# Patient Record
Sex: MALE | Race: WHITE | NOT HISPANIC OR LATINO | Employment: OTHER | URBAN - METROPOLITAN AREA
[De-identification: names, ages, dates, MRNs, and addresses within clinical notes are randomized per-mention and may not be internally consistent; named-entity substitution may affect disease eponyms.]

---

## 2017-01-17 ENCOUNTER — HOSPITAL ENCOUNTER (OUTPATIENT)
Dept: RADIOLOGY | Facility: HOSPITAL | Age: 77
Discharge: HOME | End: 2017-01-17
Attending: NEUROLOGICAL SURGERY
Payer: MEDICARE

## 2017-01-17 ENCOUNTER — OFFICE VISIT (OUTPATIENT)
Dept: NEUROSURGERY | Facility: CLINIC | Age: 77
End: 2017-01-17
Payer: MEDICARE

## 2017-01-17 VITALS
WEIGHT: 175 LBS | HEART RATE: 71 BPM | BODY MASS INDEX: 26.52 KG/M2 | HEIGHT: 68 IN | SYSTOLIC BLOOD PRESSURE: 140 MMHG | DIASTOLIC BLOOD PRESSURE: 68 MMHG | TEMPERATURE: 99 F

## 2017-01-17 DIAGNOSIS — C41.2 CHORDOMA: ICD-10-CM

## 2017-01-17 DIAGNOSIS — C41.2 CHORDOMA: Primary | ICD-10-CM

## 2017-01-17 LAB
CREAT SERPL-MCNC: 1.1 MG/DL (ref 0.5–1.4)
SAMPLE: NORMAL

## 2017-01-17 PROCEDURE — 1159F MED LIST DOCD IN RCRD: CPT | Mod: S$GLB,,, | Performed by: NEUROLOGICAL SURGERY

## 2017-01-17 PROCEDURE — 1157F ADVNC CARE PLAN IN RCRD: CPT | Mod: S$GLB,,, | Performed by: NEUROLOGICAL SURGERY

## 2017-01-17 PROCEDURE — 72197 MRI PELVIS W/O & W/DYE: CPT | Mod: 26,,, | Performed by: RADIOLOGY

## 2017-01-17 PROCEDURE — 1126F AMNT PAIN NOTED NONE PRSNT: CPT | Mod: S$GLB,,, | Performed by: NEUROLOGICAL SURGERY

## 2017-01-17 PROCEDURE — 99214 OFFICE O/P EST MOD 30 MIN: CPT | Mod: S$GLB,,, | Performed by: NEUROLOGICAL SURGERY

## 2017-01-17 PROCEDURE — 1160F RVW MEDS BY RX/DR IN RCRD: CPT | Mod: S$GLB,,, | Performed by: NEUROLOGICAL SURGERY

## 2017-01-17 PROCEDURE — 99999 PR PBB SHADOW E&M-EST. PATIENT-LVL III: CPT | Mod: PBBFAC,,, | Performed by: NEUROLOGICAL SURGERY

## 2017-01-17 RX ORDER — ASPIRIN 81 MG/1
162 TABLET ORAL DAILY
Status: ON HOLD | COMMUNITY
End: 2023-05-03 | Stop reason: SDUPTHER

## 2017-01-17 RX ORDER — GADOBUTROL 604.72 MG/ML
9 INJECTION INTRAVENOUS
Status: COMPLETED | OUTPATIENT
Start: 2017-01-17 | End: 2017-01-17

## 2017-01-17 RX ADMIN — GADOBUTROL 9 ML: 604.72 INJECTION INTRAVENOUS at 02:01

## 2017-01-17 NOTE — LETTER
January 19, 2017      Zackery Haddad MD  [address]  [address]  [facility] [place] [address]           [facility] - Neurosurgery 7th Fl  [address]  [facility] [address]  Phone: [phone]          Patient: Nimisha Longoria   MR Number: [id number]   YOB: 1940   Date of Visit: 1/17/2017       Dear Dr. Zackery Haddad:    Thank you for referring Nimisha Longoria to me for evaluation. Attached you will find relevant portions of my assessment and plan of care.    If you have questions, please do not hesitate to call me. I look forward to following Nimisha Longoria along with you.    Sincerely,    David Gonsales MD    Enclosure  CC:  No Recipients    If you would like to receive this communication electronically, please contact [email or web address] or [phone] to request more information on [facility] access.    For providers and/or their staff who would like to refer a patient to [facility], please contact us through our one-stop-shop provider referral line, [facility], at [phone].    If you feel you have received this communication in error or would no longer like to receive these types of communications, please e-mail [email or web address]

## 2017-01-17 NOTE — PROGRESS NOTES
Subjective:    I, Pool Linton, am scribing for, and in the presence of, Dr. Gonsales.     Patient ID: Nimsiha Longoria is a 76 y.o. male.    Chief Complaint: No chief complaint on file.    HPI   Pt is a 76 y.o. male who presents for follow up after last evaluation on 7/19/2016. 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 Scan at that point showed a regression of the lesion. He is here now with follow up scan. Pt states that he has been doing well, without any pain, without any bowel/bladder complaints, and without any new complaints.     Review of Systems   Constitutional: Negative for chills, diaphoresis, fatigue and fever.   HENT: Negative for congestion, rhinorrhea, sinus pressure, sneezing, sore throat and trouble swallowing.    Eyes: Negative.  Negative for visual disturbance.   Respiratory: Negative for cough, choking, chest tightness and shortness of breath.    Cardiovascular: Negative for chest pain.   Gastrointestinal: Negative for abdominal pain, diarrhea, nausea and vomiting.   Endocrine: Negative.    Genitourinary: Negative for dysuria and enuresis.   Skin: Negative for color change, pallor, rash and wound.   Neurological: Negative for syncope.   Hematological: Does not bruise/bleed easily.   Psychiatric/Behavioral: Negative for confusion.       Objective:      Physical Exam:    Constitutional: He appears well-developed and well-nourished. He is not diaphoretic. No distress.     Eyes: Pupils are equal, round, and reactive to light. Conjunctivae and EOM are normal. Right eye exhibits no discharge. Left eye exhibits no discharge.     Cardiovascular: Normal rate, regular rhythm, normal pulses, intact distal pulses, normal distal pulses, normal carotid pulses and no edema.     Abdominal: Soft.     Skin: Skin displays no rash on trunk and no rash on extremities. Skin displays no lesions on trunk and no lesions on extremities.     Psych/Behavior: He is alert. He is oriented to person, place, and time.  He has a normal mood and affect.     Musculoskeletal: Gait is normal.        Neck: Range of motion is full. There is no tenderness. Muscle strength is 5/5. Tone is normal.        Back: Range of motion is full. There is no tenderness. Muscle strength is 5/5. Tone is normal.        Right Upper Extremities: Range of motion is full. There is no tenderness. Muscle strength is 5/5. Tone is normal.        Left Upper Extremities: Range of motion is full. There is no tenderness. Muscle strength is 5/5. Tone is normal.       Right Lower Extremities: Range of motion is full. There is no tenderness. Muscle strength is 5/5. Tone is normal.        Left Lower Extremities: Range of motion is full. There is no tenderness. Muscle strength is 5/5. Tone is normal.     Neurological:        Coordination: He has a normal Romberg Test, normal finger to nose coordination, normal heel to shin coordination and normal tandem walking coordination.        Sensory: There is no sensory deficit in the trunk. There is no sensory deficit in the extremities.        DTRs: DTRs are DTRS NORMAL AND SYMMETRICnormal and symmetric. DTRs are normal. Tricep reflexes are 2+ on the right side and 2+ on the left side. Bicep reflexes are 2+ on the right side and 2+ on the left side. Brachioradialis reflexes are 2+ on the right side and 2+ on the left side. Patellar reflexes are 2+ on the right side and 2+ on the left side. Achilles reflexes are 2+ on the right side and 2+ on the left side. He displays no Babinski's sign on the right side. He displays no Babinski's sign on the left side.        Cranial nerves: Cranial nerve(s) II, III, IV, V, VI, VII, VIII, IX, X, XI and XII are intact.       Pt has no new complaints.   No new deficits.   He has full strength.   No bowel/bladder issues.     Imaging:   MRI Pelvis, dated 1/17/2017, continued to show large lobulated mass in the sacrum, consistent with a chordoma. Overall, it looks slightly smaller than the scan from  July 2016 and definitely smaller than in October 2015.     Assessment/Plan:   Pt with sacral chordoma s/p radiation treatment. Latest scan continued to show that the mass is a little smaller. The change is not as drastic as last scan. The pt is asymptomatic. He is 76 y.o.. At this point I don't think we need to do anything aggressive. He would like for us to widen the window of observation to once per year. I think that is reasonable. We will plan to see him back in 1 year, unless he has new symptoms.     I, Dr. Gonsales, personally performed the services described in this documentation as scribed by Pool Linton in my presence, and it is both accurate and complete.

## 2018-01-15 ENCOUNTER — TELEPHONE (OUTPATIENT)
Dept: NEUROSURGERY | Facility: CLINIC | Age: 78
End: 2018-01-15

## 2018-01-15 NOTE — TELEPHONE ENCOUNTER
----- Message from Krystian Bro sent at 1/15/2018  1:34 PM CST -----  Contact: Karie (wife) @ [phone]  Karie is calling to schedule appt from [facility]. Pls call.

## 2018-04-03 ENCOUNTER — LAB VISIT (OUTPATIENT)
Dept: LAB | Facility: HOSPITAL | Age: 78
End: 2018-04-03
Attending: NEUROLOGICAL SURGERY
Payer: MEDICARE

## 2018-04-03 ENCOUNTER — OFFICE VISIT (OUTPATIENT)
Dept: NEUROSURGERY | Facility: CLINIC | Age: 78
End: 2018-04-03
Payer: MEDICARE

## 2018-04-03 ENCOUNTER — HOSPITAL ENCOUNTER (OUTPATIENT)
Dept: RADIOLOGY | Facility: HOSPITAL | Age: 78
Discharge: HOME | End: 2018-04-03
Attending: NEUROLOGICAL SURGERY
Payer: MEDICARE

## 2018-04-03 VITALS
DIASTOLIC BLOOD PRESSURE: 63 MMHG | HEIGHT: 68 IN | HEART RATE: 65 BPM | SYSTOLIC BLOOD PRESSURE: 111 MMHG | WEIGHT: 187.13 LBS | BODY MASS INDEX: 28.36 KG/M2

## 2018-04-03 DIAGNOSIS — C41.2 CHORDOMA: ICD-10-CM

## 2018-04-03 DIAGNOSIS — C41.2 CHORDOMA: Primary | ICD-10-CM

## 2018-04-03 LAB
CREAT SERPL-MCNC: 1 MG/DL
CREAT SERPL-MCNC: 1.1 MG/DL (ref 0.5–1.4)
EST. GFR  (AFRICAN AMERICAN): >60 ML/MIN/1.73 M^2
EST. GFR  (NON AFRICAN AMERICAN): >60 ML/MIN/1.73 M^2
SAMPLE: NORMAL

## 2018-04-03 PROCEDURE — 25500020 PHARM REV CODE 255: Performed by: NEUROLOGICAL SURGERY

## 2018-04-03 PROCEDURE — 99214 OFFICE O/P EST MOD 30 MIN: CPT | Mod: S$GLB,,, | Performed by: NEUROLOGICAL SURGERY

## 2018-04-03 PROCEDURE — 99999 PR PBB SHADOW E&M-EST. PATIENT-LVL III: CPT | Mod: PBBFAC,,, | Performed by: NEUROLOGICAL SURGERY

## 2018-04-03 PROCEDURE — 72197 MRI PELVIS W/O & W/DYE: CPT | Mod: 26,,, | Performed by: RADIOLOGY

## 2018-04-03 PROCEDURE — 72197 MRI PELVIS W/O & W/DYE: CPT | Mod: TC

## 2018-04-03 PROCEDURE — 82565 ASSAY OF CREATININE: CPT

## 2018-04-03 PROCEDURE — A9585 GADOBUTROL INJECTION: HCPCS | Performed by: NEUROLOGICAL SURGERY

## 2018-04-03 PROCEDURE — 36415 COLL VENOUS BLD VENIPUNCTURE: CPT

## 2018-04-03 RX ORDER — GADOBUTROL 604.72 MG/ML
9 INJECTION INTRAVENOUS
Status: COMPLETED | OUTPATIENT
Start: 2018-04-03 | End: 2018-04-03

## 2018-04-03 RX ADMIN — GADOBUTROL 9 ML: 604.72 INJECTION INTRAVENOUS at 12:04

## 2018-04-03 NOTE — PROGRESS NOTES
Subjective:    I, Hilary Boyce, attest that this documentation has been prepared under the direction and in the presence of BRIGID Gonsales MD.     Patient ID: Nimisha Longoria is a 78 y.o. male.    Chief Complaint: No chief complaint on file.    HPI   Pt is a 78 y.o. male who presents for f/u with history of sacral chordoma with radiation treatment. Pt states that he is doing well. No new complaints or symptoms.     Review of Systems   Constitutional: Negative for chills, diaphoresis, fatigue and fever.   HENT: Negative for congestion, rhinorrhea, sinus pressure, sneezing, sore throat and trouble swallowing.    Eyes: Negative.  Negative for visual disturbance.   Respiratory: Negative for cough, choking, chest tightness and shortness of breath.    Cardiovascular: Negative for chest pain.   Gastrointestinal: Negative for abdominal pain, diarrhea, nausea and vomiting.   Endocrine: Negative.    Genitourinary: Negative for dysuria.   Skin: Negative for color change, pallor, rash and wound.   Neurological: Negative for syncope.   Hematological: Does not bruise/bleed easily.   Psychiatric/Behavioral: Negative for confusion.       Objective:      Physical Exam:  Nursing note and vitals reviewed.    Constitutional: He appears well-developed.     Eyes: Pupils are equal, round, and reactive to light. Conjunctivae and EOM are normal.     Cardiovascular: Normal rate, regular rhythm, normal pulses and intact distal pulses.     Abdominal: Soft.     Psych/Behavior: He is alert. He is oriented to person, place, and time. He has a normal mood and affect.     Musculoskeletal: Gait is normal.        Neck: Range of motion is full. There is no tenderness. Muscle strength is 5/5. Tone is normal.        Back: Range of motion is full. There is no tenderness. Muscle strength is 5/5. Tone is normal.        Right Upper Extremities: Range of motion is full. There is no tenderness. Muscle strength is 5/5. Tone is normal.        Left Upper Extremities:  Range of motion is full. There is no tenderness. Muscle strength is 5/5. Tone is normal.       Right Lower Extremities: Range of motion is full. There is no tenderness. Muscle strength is 5/5. Tone is normal.        Left Lower Extremities: Range of motion is full. There is no tenderness. Muscle strength is 5/5. Tone is normal.     Neurological:        Coordination: He has a normal Romberg Test, normal finger to nose coordination, normal heel to shin coordination and normal tandem walking coordination.        DTRs: DTRs are normal. Tricep reflexes are 2+ on the right side and 2+ on the left side. Bicep reflexes are 2+ on the right side and 2+ on the left side. Brachioradialis reflexes are 2+ on the right side and 2+ on the left side. Patellar reflexes are 2+ on the right side and 2+ on the left side. Achilles reflexes are 2+ on the right side and 2+ on the left side.        Cranial nerves: Cranial nerve(s) II, III, IV, V, VI, VII, VIII, IX, X, XI and XII are intact.       Pt doing well without any new symptoms.   No b/b issues.   No increased pain.     Imaging:   MRI L spine, dated 1/19/2018, shows chordoma remains stable and unchanged from before. Maybe even smaller.     IBRIGID MD, personally reviewed the imaging and interpreted independent of the radiology report.    Assessment/Plan:   Pt with a sacral chordoma s/p radiation treatment doing well and is stable, so I think we can widen the window of observation and scan in 2 years.     IBRIGID MD, personally performed the services described in this documentation. All medical record entries made by the scribe, Hilary Boyce, were at my direction and in my presence.  I have reviewed the chart and agree that the record reflects my personal performance and is accurate and complete.

## 2019-08-26 ENCOUNTER — TELEPHONE (OUTPATIENT)
Dept: NEUROSURGERY | Facility: CLINIC | Age: 79
End: 2019-08-26

## 2019-08-26 DIAGNOSIS — C41.2 CHORDOMA: Primary | ICD-10-CM

## 2019-08-27 ENCOUNTER — TELEPHONE (OUTPATIENT)
Dept: NEUROSURGERY | Facility: CLINIC | Age: 79
End: 2019-08-27

## 2019-08-27 DIAGNOSIS — M54.50 LUMBAR BACK PAIN: ICD-10-CM

## 2019-08-27 DIAGNOSIS — C41.2 CHORDOMA: Primary | ICD-10-CM

## 2019-09-28 LAB
ALBUMIN SERPL-MCNC: 4.3 G/DL (ref 3.6–5.1)
ALBUMIN/GLOB SERPL: 1.9 (CALC) (ref 1–2.5)
ALP SERPL-CCNC: 57 U/L (ref 40–115)
ALT SERPL-CCNC: 11 U/L (ref 9–46)
AST SERPL-CCNC: 17 U/L (ref 10–35)
BASOPHILS # BLD AUTO: 19 CELLS/UL (ref 0–200)
BASOPHILS NFR BLD AUTO: 0.4 %
BILIRUB SERPL-MCNC: 0.7 MG/DL (ref 0.2–1.2)
BUN SERPL-MCNC: 16 MG/DL (ref 7–25)
BUN/CREAT SERPL: ABNORMAL (CALC) (ref 6–22)
CALCIUM SERPL-MCNC: 9.5 MG/DL (ref 8.6–10.3)
CHLORIDE SERPL-SCNC: 105 MMOL/L (ref 98–110)
CHOLEST SERPL-MCNC: 142 MG/DL
CHOLEST/HDLC SERPL: 3.2 (CALC)
CLIENT CONTACT:: NORMAL
CO2 SERPL-SCNC: 31 MMOL/L (ref 20–32)
COMMENT: NORMAL
CREAT SERPL-MCNC: 0.95 MG/DL (ref 0.7–1.11)
EOSINOPHIL # BLD AUTO: 82 CELLS/UL (ref 15–500)
EOSINOPHIL NFR BLD AUTO: 1.7 %
ERYTHROCYTE [DISTWIDTH] IN BLOOD BY AUTOMATED COUNT: 12.6 % (ref 11–15)
GFRSERPLBLD MDRD-ARVRAT: 75 ML/MIN/1.73M2
GLOBULIN SER CALC-MCNC: 2.3 G/DL (CALC) (ref 1.9–3.7)
GLUCOSE SERPL-MCNC: 104 MG/DL (ref 65–99)
HCT VFR BLD AUTO: 36.7 % (ref 38.5–50)
HDLC SERPL-MCNC: 45 MG/DL
HGB BLD-MCNC: 12.1 G/DL (ref 13.2–17.1)
LDLC SERPL CALC-MCNC: 83 MG/DL (CALC)
LYMPHOCYTES # BLD AUTO: 1042 CELLS/UL (ref 850–3900)
LYMPHOCYTES NFR BLD AUTO: 21.7 %
Lab: NORMAL
MCH RBC QN AUTO: 30.5 PG (ref 27–33)
MCHC RBC AUTO-ENTMCNC: 33 G/DL (ref 32–36)
MCV RBC AUTO: 92.4 FL (ref 80–100)
MONOCYTES # BLD AUTO: 408 CELLS/UL (ref 200–950)
MONOCYTES NFR BLD AUTO: 8.5 %
NEUTROPHILS # BLD AUTO: 3250 CELLS/UL (ref 1500–7800)
NEUTROPHILS NFR BLD AUTO: 67.7 %
NONHDLC SERPL-MCNC: 97 MG/DL (CALC)
PLATELET # BLD AUTO: 170 THOUSAND/UL (ref 140–400)
PMV BLD REES-ECKER: 11.2 FL (ref 7.5–12.5)
POTASSIUM SERPL-SCNC: 4.8 MMOL/L (ref 3.5–5.3)
PROT SERPL-MCNC: 6.6 G/DL (ref 6.1–8.1)
RBC # BLD AUTO: 3.97 MILLION/UL (ref 4.2–5.8)
REF LAB TEST NAME: NORMAL
REF LAB TEST: NORMAL
SODIUM SERPL-SCNC: 141 MMOL/L (ref 135–146)
TRIGL SERPL-MCNC: 62 MG/DL
WBC # BLD AUTO: 4.8 THOUSAND/UL (ref 3.8–10.8)

## 2019-10-03 ENCOUNTER — OFFICE VISIT (OUTPATIENT)
Dept: FAMILY MEDICINE | Facility: CLINIC | Age: 79
End: 2019-10-03
Payer: MEDICARE

## 2019-10-03 VITALS
WEIGHT: 171.81 LBS | SYSTOLIC BLOOD PRESSURE: 130 MMHG | DIASTOLIC BLOOD PRESSURE: 66 MMHG | HEART RATE: 80 BPM | HEIGHT: 68 IN | OXYGEN SATURATION: 98 % | BODY MASS INDEX: 26.04 KG/M2

## 2019-10-03 DIAGNOSIS — D64.9 ANEMIA, UNSPECIFIED TYPE: ICD-10-CM

## 2019-10-03 DIAGNOSIS — Z92.89 HISTORY OF NUCLEAR STRESS TEST: ICD-10-CM

## 2019-10-03 DIAGNOSIS — K42.9 UMBILICAL HERNIA WITHOUT OBSTRUCTION AND WITHOUT GANGRENE: ICD-10-CM

## 2019-10-03 DIAGNOSIS — C41.2 CHORDOMA: Primary | ICD-10-CM

## 2019-10-03 DIAGNOSIS — I25.10 CORONARY ARTERY DISEASE INVOLVING NATIVE CORONARY ARTERY OF NATIVE HEART WITHOUT ANGINA PECTORIS: ICD-10-CM

## 2019-10-03 DIAGNOSIS — I10 ESSENTIAL HYPERTENSION: ICD-10-CM

## 2019-10-03 PROCEDURE — 99214 OFFICE O/P EST MOD 30 MIN: CPT | Mod: S$GLB,,, | Performed by: FAMILY MEDICINE

## 2019-10-03 PROCEDURE — 1101F PR PT FALLS ASSESS DOC 0-1 FALLS W/OUT INJ PAST YR: ICD-10-PCS | Mod: S$GLB,,, | Performed by: FAMILY MEDICINE

## 2019-10-03 PROCEDURE — 99214 PR OFFICE/OUTPT VISIT, EST, LEVL IV, 30-39 MIN: ICD-10-PCS | Mod: S$GLB,,, | Performed by: FAMILY MEDICINE

## 2019-10-03 PROCEDURE — 1101F PT FALLS ASSESS-DOCD LE1/YR: CPT | Mod: S$GLB,,, | Performed by: FAMILY MEDICINE

## 2019-10-03 NOTE — PROGRESS NOTES
SUBJECTIVE:    Patient ID: Nimisha Longoria is a 79 y.o. male.    Chief Complaint: Follow-up (Lab Review)    This 79-year-old male has been working hard around the house and doing lots of yd work this summer.  He has had a decreased appetite due to multiple family stressors.  And he has lost 5 lb recently.  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 (LDL+VLDL) 09/27/2019 97  <130 mg/dL (calc) Final    Glucose 09/27/2019 104* 65 - 99 mg/dL Final    BUN, Bld 09/27/2019 16  7 - 25 mg/dL Final    Creatinine 09/27/2019 0.95  0.70 - 1.11 mg/dL Final    eGFR if non African American 09/27/2019 75  > OR = 60 mL/min/1.73m2 Final    eGFR if  09/27/2019 87  > OR = 60 mL/min/1.73m2 Final    BUN/Creatinine Ratio 09/27/2019 NOT APPLICABLE  6 - 22 (calc) Final    Sodium 09/27/2019 141  135 - 146 mmol/L Final    Potassium 09/27/2019 4.8  3.5 - 5.3 mmol/L Final    Chloride 09/27/2019 105  98 - 110 mmol/L Final    CO2 09/27/2019 31  20 - 32 mmol/L Final    Calcium 09/27/2019 9.5  8.6 - 10.3 mg/dL Final    Total Protein 09/27/2019 6.6  6.1 - 8.1 g/dL Final    Albumin 09/27/2019 4.3  3.6 - 5.1 g/dL Final    Globulin, Total 09/27/2019 2.3  1.9 - 3.7 g/dL (calc) Final    Albumin/Globulin Ratio 09/27/2019 1.9  1.0 - 2.5 (calc) Final    Total Bilirubin 09/27/2019 0.7  0.2 - 1.2 mg/dL Final    Alkaline Phosphatase 09/27/2019 57  40 - 115 U/L Final    AST 09/27/2019 17  10 - 35 U/L Final    ALT  09/27/2019 11  9 - 46 U/L Final    WBC 09/27/2019 4.8  3.8 - 10.8 Thousand/uL Final    RBC 09/27/2019 3.97* 4.20 - 5.80 Million/uL Final    Hemoglobin 09/27/2019 12.1* 13.2 - 17.1 g/dL Final    Hematocrit 09/27/2019 36.7* 38.5 - 50.0 % Final    Mean Corpuscular Volume 09/27/2019 92.4  80.0 - 100.0 fL Final    Mean Corpuscular Hemoglobin 09/27/2019 30.5  27.0 - 33.0 pg Final    Mean Corpuscular Hemoglobin Conc 09/27/2019 33.0  32.0 - 36.0 g/dL Final    RDW 09/27/2019 12.6  11.0 - 15.0 % Final    Platelets 09/27/2019 170  140 - 400 Thousand/uL Final    MPV 09/27/2019 11.2  7.5 - 12.5 fL Final    Neutrophils Absolute 09/27/2019 3,250  1,500 - 7,800 cells/uL Final    Lymph # 09/27/2019 1,042  850 - 3,900 cells/uL Final    Mono # 09/27/2019 408  200 - 950 cells/uL Final    Eos # 09/27/2019 82  15 - 500 cells/uL Final    Baso # 09/27/2019 19  0 - 200 cells/uL Final    Neutrophils Relative 09/27/2019 67.7  % Final    Lymph% 09/27/2019 21.7  % Final    Mono% 09/27/2019 8.5  % Final    Eosinophil% 09/27/2019 1.7  % Final    Basophil% 09/27/2019 0.4  % Final    Test(s) Ordered on Requisition 09/27/2019 CBC,CMP,LIPID PANEL   Final    Test Code: 09/27/2019 6399,17579,9350   Final    Client Contact: 09/27/2019 ANASTACIO   Final    Comments: 09/27/2019    Final    Comment 09/27/2019    Final       Past Medical History:   Diagnosis Date    Anemia     Coronary artery disease     Hyperlipidemia     Hypertension     Sacral chordoma      Past Surgical History:   Procedure Laterality Date    BACK SURGERY      CORONARY ARTERY BYPASS GRAFT      SPINE SURGERY      Dr Villatoro     History reviewed. No pertinent family history.    Marital Status:   Alcohol History:  reports that he does not drink alcohol.  Tobacco History:  reports that he has quit smoking. His smoking use included cigarettes. He has a 50.00 pack-year smoking history. He has never used smokeless tobacco.  Drug History:  reports that he  "does not use drugs.    Review of patient's allergies indicates:  No Known Allergies    Current Outpatient Medications:     aspirin (ECOTRIN) 81 MG EC tablet, Take 81 mg by mouth once daily., Disp: , Rfl:     atorvastatin (LIPITOR) 40 MG tablet, Take by mouth once daily., Disp: , Rfl:     fenofibrate 160 MG Tab, Take 160 mg by mouth once daily., Disp: , Rfl:     metoprolol succinate (TOPROL-XL) 25 MG 24 hr tablet, , Disp: , Rfl:     Review of Systems   Constitutional: Negative for appetite change, chills, fatigue, fever and unexpected weight change.   HENT: Negative for congestion, ear pain and trouble swallowing.    Eyes: Negative for pain, discharge and visual disturbance.   Respiratory: Negative for apnea, cough, shortness of breath and wheezing.    Cardiovascular: Negative for chest pain and leg swelling.   Gastrointestinal: Positive for constipation (stool softeners, senna tabs). Negative for abdominal pain, blood in stool, diarrhea, nausea and vomiting.        Asymptomatic umbilical hernia   Endocrine: Negative for cold intolerance, heat intolerance and polydipsia.   Genitourinary: Negative for dysuria, hematuria, testicular pain and urgency.        Nocturia 0-1   Musculoskeletal: Negative for gait problem, joint swelling and myalgias.   Neurological: Negative for dizziness, seizures and numbness.   Psychiatric/Behavioral: Negative for agitation, behavioral problems, hallucinations and sleep disturbance. The patient is not nervous/anxious.           Objective:      Vitals:    10/03/19 1502   BP: 130/66   Pulse: 80   SpO2: 98%   Weight: 77.9 kg (171 lb 12.8 oz)   Height: 5' 8" (1.727 m)     Body mass index is 26.12 kg/m².  Physical Exam   Constitutional: He is oriented to person, place, and time. He appears well-developed and well-nourished. No distress.   HENT:   Head: Normocephalic and atraumatic.   Right Ear: External ear normal.   Left Ear: External ear normal.   Nose: Nose normal.   Mouth/Throat: " Oropharynx is clear and moist.   Eyes: Pupils are equal, round, and reactive to light. EOM are normal.   Neck: Normal range of motion. Neck supple. Carotid bruit is not present. No thyromegaly present.   Cardiovascular: Normal rate, regular rhythm, normal heart sounds and intact distal pulses.   No murmur heard.  Pulmonary/Chest: Effort normal and breath sounds normal. He has no wheezes. He has no rales.   Abdominal: Soft. Bowel sounds are normal. He exhibits no distension. There is no hepatosplenomegaly. There is no tenderness.   Small reducible umbilical hernia is present   Musculoskeletal: Normal range of motion. He exhibits no tenderness or deformity.        Lumbar back: Normal. He exhibits no pain and no spasm.   Bends 90 degrees at  waist   Lymphadenopathy:     He has no cervical adenopathy.   Neurological: He is alert and oriented to person, place, and time. No cranial nerve deficit. Coordination normal.   Skin: Skin is warm and dry. No rash noted.   Psychiatric: He has a normal mood and affect. His behavior is normal. Judgment and thought content normal.   Nursing note and vitals reviewed.        Assessment:       1. Chordoma    2. Anemia, unspecified type    3. Essential hypertension    4. Coronary artery disease involving native coronary artery of native heart without angina pectoris    5. History of nuclear stress test    6. Umbilical hernia without obstruction and without gangrene         Plan:       Chordoma  Patient has sequela of chordoma treatments with neuropathy to the feet.  He functions well though  Anemia, unspecified type  Very mild anemia with hematocrit of 36  Essential hypertension  Stable on medication  Coronary artery disease involving native coronary artery of native heart without angina pectoris  No current angina or chest pain  History of nuclear stress test    Umbilical hernia without obstruction and without gangrene  Asymptomatic and he does not want surgery at this time  He declines  flu shot at this time  No follow-ups on file.    repeat labs in 6 months

## 2019-11-21 ENCOUNTER — TELEPHONE (OUTPATIENT)
Dept: UROLOGY | Facility: CLINIC | Age: 79
End: 2019-11-21

## 2019-11-21 NOTE — TELEPHONE ENCOUNTER
Spoke w pts wife regarding pts past urological history she stated pt has not seen a urologist before and will be seeing us for Urinary incontinence.Psa history noted in Care everywhere. Appt confirmed/ informed that urine sample will be needed at upon arrival of appt.

## 2019-11-24 NOTE — PROGRESS NOTES
"[facility] Urology New Patient/H&P:    Nimisha Longoria is a 79 y.o. male who presents for evaluation of urinary incontinence    He was last seen by PCP last month noted to be active with yardwork and still feels strong and has no complaints other than his chronically numb feet from neuropathy.  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 Cr 9/27/19 0.95. UA negative 3/20/19. PSA 2.9 in 2016 (age 76).    Has leakage of urine only after urinating - more of a post void dribble  Has some occasional urgency but denies urge incontinence  No leak with cough, sneeze  No real "leakage" but when done urinating will dribble in his pants walking away from bathroom  Now has started to wait at end of urination and double void and feels empty after the second time  Stream is weak.  No significant changes in urination since treatment for sacral chordoma  Continues to see Dr Gonsales with neurosurgery - noted tumor was pushing on colon from inside. Had colonoscopy.   F/u with dr gonsales Jan 14 2020 - new mri pending  Does have some trouble with bowel movements - hard stool.  AUA SS: 17/3 (4 weak stream; 3 intermittency, urgency, straining; 2 emptying, sleeping)  No hematuria, no dysuria  Urinated prior to arrival and PV residual by bladder scan    Past Medical History:   Diagnosis Date    Anemia     Coronary artery disease     Hyperlipidemia     Hypertension     Sacral chordoma        Past Surgical History:   Procedure Laterality Date    BACK SURGERY      CORONARY ARTERY BYPASS GRAFT      SPINE SURGERY      Dr Villatoro       History reviewed. No pertinent family history.    Social History     Socioeconomic History    Marital status:      Spouse name: Not on file    Number of children: Not on file    Years of education: Not on file    Highest education level: Not on file   Occupational History    Not on file   Social Needs    Financial resource " strain: Not on file    Food insecurity:     Worry: Not on file     Inability: Not on file    Transportation needs:     Medical: Not on file     Non-medical: Not on file   Tobacco Use    Smoking status: Former Smoker     Packs/day: 2.00     Years: 25.00     Pack years: 50.00     Types: Cigarettes    Smokeless tobacco: Never Used    Tobacco comment: quit 1985   Substance and Sexual Activity    Alcohol use: No    Drug use: No    Sexual activity: Not on file   Lifestyle    Physical activity:     Days per week: Not on file     Minutes per session: Not on file    Stress: Not at all   Relationships    Social connections:     Talks on phone: Not on file     Gets together: Not on file     Attends Sikhism service: Not on file     Active member of club or organization: Not on file     Attends meetings of clubs or organizations: Not on file     Relationship status: Not on file   Other Topics Concern    Not on file   Social History Narrative    Not on file       Review of patient's allergies indicates:  No Known Allergies    Medications Reviewed: see MAR    ROS:    Constitutional: denies fevers, chills, night sweats, fatigue, malaise  Respiratory: negative for cough, shortness of breath, wheezing, dyspnea.  Cardiovascular: + for high blood pressure, negative for chest pain, varicose veins, ankle swelling, palpitations, syncope.  GI: negative for abdominal pain, heartburn, indigestion, nausea, vomiting, constipation, diarrhea, blood in stool.   Urology: as noted above in HPI  Endocrinology: negative for cold intolerance, excessive thirst, not feeling tired/sluggish, no heat intolerance.   Hematology/Lymph: negative for easy bleeding, easy bruising, swollen glands.  Musculoskeletal: negative for back pain, joint pain, joint swelling, neck pain.  Allergy-Immunology: negative for seasonal allergies, negative for unusual infections.   Skin: negative for boils, breast lumps, hives, itching, rash.   Neurology: + LE  "numbness, negative for dizziness, headache, tremors.   Psych: satisfied with life; negative for, anxiety, depression, suicidal thoughts.     PHYSICAL EXAM:    Vitals:    11/25/19 1047   BP: 123/70   Pulse: 68   Resp: 18   Temp: 97.7 °F (36.5 °C)     Body mass index is 26.82 kg/m². Weight: 80 kg (176 lb 5.9 oz) Height: 5' 8" (172.7 cm)       General: Alert, cooperative, no distress, appears stated age  Head: Normocephalic, without obvious abnormality, atraumatic  Neck: no masses, no thyromegaly, no lymphadenopathy  Eyes: PERRL, conjunctiva/corneas clear  Lungs: Respirations unlabored, normal effort, no accessory muscle use  CV: Warm and well perfused extremities  Abdomen: Soft, non-tender, no CVA tenderness, no hepatosplenomegaly, no hernia  Penis: phallus normal, circumcised, well cared for, no plaques or lesions.   Scrotum: no cysts, no lesions, no rash, no hydrocele.   Epididymes: normal, nontender, symmetrical, no masses or cysts.   Testes: normal, both descended, no masses.   Urethra: palpably normal with orthotopic meatus of normal size    CHIDI: normal sphincter tone, no masses, no hemmorrhoids   PROSTATE: 35-40g, no nodules, non-tender, symmetrical.   Extremities: Extremities normal, atraumatic, no cyanosis or edema  Skin: Normal color, texture, and turgor, no rashes or lesions  Psych: Appropriate, well oriented, normal affect, normal mood  Neuro: Non-focal      Assessment/Diagnosis:    1. BPH with obstruction/lower urinary tract symptoms     2. Post-void dribbling  POCT URINE DIPSTICK WITHOUT MICROSCOPE    POCT Bladder Scan       Plans:  We did review all of his urinary symptoms in detail and I do not believe he has any true incontinence but rather a bothersome postvoid dribble.  He does have moderate to severe obstructive lower urinary tract symptoms with predominant weak stream intermittency and difficulty emptying with postvoid dribbling.  We did review his history of sacral chordoma and treatment for this " sacral spinal cord tumor of which there has not been significant change in his urinary habits since and we did discuss that sacral nerve issues to the bladder would usually manifest as urgency frequency and overactive bladder symptoms which he largely does not have.  He does however have some affect on colon and bowel movements from this tumor and we did discuss the compressive effect on the bladder of constipation and distended colon and he does have large hard stools consistent with chronic constipation and we did discuss over-the-counter bowel regimen elements to facilitate soft daily bowel movements which may improve some of his urinary symptoms as well.  This was provided in writing. May need future GI eval  At this time, as the majority of his symptoms are obstructive with a mildly enlarged prostate, we did discuss starting alpha-blocker therapy with Flomax.  Flomax 0.4 mg nightly was prescribed and we did discuss common side effects of this medication.   RTC 6 mos to see NP for reeval.

## 2019-11-25 ENCOUNTER — OFFICE VISIT (OUTPATIENT)
Dept: UROLOGY | Facility: CLINIC | Age: 79
End: 2019-11-25
Payer: MEDICARE

## 2019-11-25 VITALS
SYSTOLIC BLOOD PRESSURE: 123 MMHG | WEIGHT: 176.38 LBS | TEMPERATURE: 98 F | DIASTOLIC BLOOD PRESSURE: 70 MMHG | BODY MASS INDEX: 26.73 KG/M2 | HEIGHT: 68 IN | RESPIRATION RATE: 18 BRPM | HEART RATE: 68 BPM

## 2019-11-25 DIAGNOSIS — N39.43 POST-VOID DRIBBLING: ICD-10-CM

## 2019-11-25 DIAGNOSIS — N40.1 BPH WITH OBSTRUCTION/LOWER URINARY TRACT SYMPTOMS: Primary | ICD-10-CM

## 2019-11-25 DIAGNOSIS — N13.8 BPH WITH OBSTRUCTION/LOWER URINARY TRACT SYMPTOMS: Primary | ICD-10-CM

## 2019-11-25 PROCEDURE — 99999 PR PBB SHADOW E&M-EST. PATIENT-LVL III: CPT | Mod: PBBFAC,,, | Performed by: UROLOGY

## 2019-11-25 PROCEDURE — 1159F MED LIST DOCD IN RCRD: CPT | Mod: S$GLB,,, | Performed by: UROLOGY

## 2019-11-25 PROCEDURE — 1126F AMNT PAIN NOTED NONE PRSNT: CPT | Mod: S$GLB,,, | Performed by: UROLOGY

## 2019-11-25 PROCEDURE — 1159F PR MEDICATION LIST DOCUMENTED IN MEDICAL RECORD: ICD-10-PCS | Mod: S$GLB,,, | Performed by: UROLOGY

## 2019-11-25 PROCEDURE — 1101F PR PT FALLS ASSESS DOC 0-1 FALLS W/OUT INJ PAST YR: ICD-10-PCS | Mod: CPTII,S$GLB,, | Performed by: UROLOGY

## 2019-11-25 PROCEDURE — 99203 OFFICE O/P NEW LOW 30 MIN: CPT | Mod: S$GLB,,, | Performed by: UROLOGY

## 2019-11-25 PROCEDURE — 99203 PR OFFICE/OUTPT VISIT, NEW, LEVL III, 30-44 MIN: ICD-10-PCS | Mod: S$GLB,,, | Performed by: UROLOGY

## 2019-11-25 PROCEDURE — 99999 PR PBB SHADOW E&M-EST. PATIENT-LVL III: ICD-10-PCS | Mod: PBBFAC,,, | Performed by: UROLOGY

## 2019-11-25 PROCEDURE — 1126F PR PAIN SEVERITY QUANTIFIED, NO PAIN PRESENT: ICD-10-PCS | Mod: S$GLB,,, | Performed by: UROLOGY

## 2019-11-25 PROCEDURE — 1101F PT FALLS ASSESS-DOCD LE1/YR: CPT | Mod: CPTII,S$GLB,, | Performed by: UROLOGY

## 2019-11-25 RX ORDER — TAMSULOSIN HYDROCHLORIDE 0.4 MG/1
0.4 CAPSULE ORAL NIGHTLY
Qty: 30 CAPSULE | Refills: 11 | Status: SHIPPED | OUTPATIENT
Start: 2019-11-25 | End: 2020-01-27 | Stop reason: SDUPTHER

## 2019-12-03 RX ORDER — METOPROLOL SUCCINATE 25 MG/1
25 TABLET, EXTENDED RELEASE ORAL DAILY
Qty: 90 TABLET | Refills: 1 | Status: SHIPPED | OUTPATIENT
Start: 2019-12-03 | End: 2020-01-27 | Stop reason: SDUPTHER

## 2019-12-03 NOTE — TELEPHONE ENCOUNTER
----- Message from Malka Noriega sent at 12/3/2019 12:08 PM CST -----  Contact: pts wife  Metoprolol no longer available @ [facility].. Pharmacy told them to call and let us know so we are able to call in something else for him.    [phone]

## 2019-12-03 NOTE — TELEPHONE ENCOUNTER
Spoke with pts wife to see if she wanted to get it filled at the local in the mean time. Agrees, [facility]

## 2020-01-14 ENCOUNTER — OFFICE VISIT (OUTPATIENT)
Dept: NEUROSURGERY | Facility: CLINIC | Age: 80
End: 2020-01-14
Payer: MEDICARE

## 2020-01-14 ENCOUNTER — HOSPITAL ENCOUNTER (OUTPATIENT)
Dept: RADIOLOGY | Facility: HOSPITAL | Age: 80
Discharge: HOME | End: 2020-01-14
Attending: NEUROLOGICAL SURGERY
Payer: MEDICARE

## 2020-01-14 VITALS
HEART RATE: 64 BPM | DIASTOLIC BLOOD PRESSURE: 65 MMHG | WEIGHT: 176 LBS | SYSTOLIC BLOOD PRESSURE: 137 MMHG | HEIGHT: 69 IN | TEMPERATURE: 97 F | BODY MASS INDEX: 26.07 KG/M2

## 2020-01-14 DIAGNOSIS — C41.2 CHORDOMA: Primary | ICD-10-CM

## 2020-01-14 DIAGNOSIS — M54.50 LUMBAR BACK PAIN: ICD-10-CM

## 2020-01-14 DIAGNOSIS — G54.9 PLEXOPATHY: ICD-10-CM

## 2020-01-14 DIAGNOSIS — C41.2 CHORDOMA: ICD-10-CM

## 2020-01-14 PROCEDURE — A9585 GADOBUTROL INJECTION: HCPCS | Performed by: NEUROLOGICAL SURGERY

## 2020-01-14 PROCEDURE — 1126F PR PAIN SEVERITY QUANTIFIED, NO PAIN PRESENT: ICD-10-PCS | Mod: S$GLB,,, | Performed by: NEUROLOGICAL SURGERY

## 2020-01-14 PROCEDURE — 25500020 PHARM REV CODE 255: Performed by: NEUROLOGICAL SURGERY

## 2020-01-14 PROCEDURE — 3078F DIAST BP <80 MM HG: CPT | Mod: CPTII,S$GLB,, | Performed by: NEUROLOGICAL SURGERY

## 2020-01-14 PROCEDURE — 99999 PR PBB SHADOW E&M-EST. PATIENT-LVL III: CPT | Mod: PBBFAC,,, | Performed by: NEUROLOGICAL SURGERY

## 2020-01-14 PROCEDURE — 3078F PR MOST RECENT DIASTOLIC BLOOD PRESSURE < 80 MM HG: ICD-10-PCS | Mod: CPTII,S$GLB,, | Performed by: NEUROLOGICAL SURGERY

## 2020-01-14 PROCEDURE — 1126F AMNT PAIN NOTED NONE PRSNT: CPT | Mod: S$GLB,,, | Performed by: NEUROLOGICAL SURGERY

## 2020-01-14 PROCEDURE — 72197 MRI PELVIS W/O & W/DYE: CPT | Mod: 26,,, | Performed by: RADIOLOGY

## 2020-01-14 PROCEDURE — 99214 OFFICE O/P EST MOD 30 MIN: CPT | Mod: S$GLB,,, | Performed by: NEUROLOGICAL SURGERY

## 2020-01-14 PROCEDURE — 99999 PR PBB SHADOW E&M-EST. PATIENT-LVL III: ICD-10-PCS | Mod: PBBFAC,,, | Performed by: NEUROLOGICAL SURGERY

## 2020-01-14 PROCEDURE — 1101F PR PT FALLS ASSESS DOC 0-1 FALLS W/OUT INJ PAST YR: ICD-10-PCS | Mod: CPTII,S$GLB,, | Performed by: NEUROLOGICAL SURGERY

## 2020-01-14 PROCEDURE — 3075F PR MOST RECENT SYSTOLIC BLOOD PRESS GE 130-139MM HG: ICD-10-PCS | Mod: CPTII,S$GLB,, | Performed by: NEUROLOGICAL SURGERY

## 2020-01-14 PROCEDURE — 72197 MRI PELVIS W WO CONTRAST: ICD-10-PCS | Mod: 26,,, | Performed by: RADIOLOGY

## 2020-01-14 PROCEDURE — 99214 PR OFFICE/OUTPT VISIT, EST, LEVL IV, 30-39 MIN: ICD-10-PCS | Mod: S$GLB,,, | Performed by: NEUROLOGICAL SURGERY

## 2020-01-14 PROCEDURE — 3075F SYST BP GE 130 - 139MM HG: CPT | Mod: CPTII,S$GLB,, | Performed by: NEUROLOGICAL SURGERY

## 2020-01-14 PROCEDURE — 1159F MED LIST DOCD IN RCRD: CPT | Mod: S$GLB,,, | Performed by: NEUROLOGICAL SURGERY

## 2020-01-14 PROCEDURE — 1159F PR MEDICATION LIST DOCUMENTED IN MEDICAL RECORD: ICD-10-PCS | Mod: S$GLB,,, | Performed by: NEUROLOGICAL SURGERY

## 2020-01-14 PROCEDURE — 1101F PT FALLS ASSESS-DOCD LE1/YR: CPT | Mod: CPTII,S$GLB,, | Performed by: NEUROLOGICAL SURGERY

## 2020-01-14 PROCEDURE — 72158 MRI LUMBAR SPINE W/O & W/DYE: CPT | Mod: 26,,, | Performed by: RADIOLOGY

## 2020-01-14 PROCEDURE — 72158 MRI LUMBAR SPINE W/O & W/DYE: CPT | Mod: TC

## 2020-01-14 PROCEDURE — 72197 MRI PELVIS W/O & W/DYE: CPT | Mod: TC

## 2020-01-14 PROCEDURE — 72158 MRI LUMBAR SPINE W WO CONTRAST: ICD-10-PCS | Mod: 26,,, | Performed by: RADIOLOGY

## 2020-01-14 RX ORDER — GADOBUTROL 604.72 MG/ML
8 INJECTION INTRAVENOUS
Status: COMPLETED | OUTPATIENT
Start: 2020-01-14 | End: 2020-01-14

## 2020-01-14 RX ADMIN — GADOBUTROL 8 ML: 604.72 INJECTION INTRAVENOUS at 12:01

## 2020-01-16 LAB
CREAT SERPL-MCNC: 0.9 MG/DL (ref 0.5–1.4)
SAMPLE: NORMAL

## 2020-01-20 ENCOUNTER — TELEPHONE (OUTPATIENT)
Dept: FAMILY MEDICINE | Facility: CLINIC | Age: 80
End: 2020-01-20

## 2020-01-20 NOTE — TELEPHONE ENCOUNTER
----- Message from Brandi Carter sent at 1/20/2020  2:41 PM CST -----  Refill for Atorvastatin, Metoprolol, Fenofibrate, Tamsulosin  3 month supply. [facility] mail order. Pt #[phone]

## 2020-01-27 ENCOUNTER — CLINICAL SUPPORT (OUTPATIENT)
Dept: FAMILY MEDICINE | Facility: CLINIC | Age: 80
End: 2020-01-27
Payer: MEDICARE

## 2020-01-27 DIAGNOSIS — I10 ESSENTIAL HYPERTENSION: Primary | ICD-10-CM

## 2020-01-27 DIAGNOSIS — E78.5 HYPERLIPIDEMIA, UNSPECIFIED HYPERLIPIDEMIA TYPE: ICD-10-CM

## 2020-01-27 DIAGNOSIS — N40.0 BENIGN PROSTATIC HYPERPLASIA, UNSPECIFIED WHETHER LOWER URINARY TRACT SYMPTOMS PRESENT: ICD-10-CM

## 2020-01-27 RX ORDER — TAMSULOSIN HYDROCHLORIDE 0.4 MG/1
0.4 CAPSULE ORAL NIGHTLY
Qty: 90 CAPSULE | Refills: 1 | Status: SHIPPED | OUTPATIENT
Start: 2020-01-27 | End: 2020-11-02 | Stop reason: SDUPTHER

## 2020-01-27 RX ORDER — METOPROLOL SUCCINATE 25 MG/1
25 TABLET, EXTENDED RELEASE ORAL DAILY
Qty: 90 TABLET | Refills: 1 | Status: SHIPPED | OUTPATIENT
Start: 2020-01-27 | End: 2020-09-08 | Stop reason: SDUPTHER

## 2020-01-27 RX ORDER — ATORVASTATIN CALCIUM 40 MG/1
40 TABLET, FILM COATED ORAL DAILY
Qty: 90 TABLET | Refills: 1 | Status: SHIPPED | OUTPATIENT
Start: 2020-01-27 | End: 2020-11-02 | Stop reason: SDUPTHER

## 2020-01-27 RX ORDER — FENOFIBRATE 160 MG/1
160 TABLET ORAL DAILY
Qty: 90 TABLET | Refills: 1 | Status: SHIPPED | OUTPATIENT
Start: 2020-01-27 | End: 2020-11-02 | Stop reason: SDUPTHER

## 2020-02-21 NOTE — PROGRESS NOTES
Established [facility]ate[facility]    SUBJECTIVE:     History of Present Illness:  Patient back to see me for follow-up after last evaluation on 04/03/2018.  This is a an 80-year-old male with a history of sacral chordoma status post partial debulking and then ultimately radiation.  Patient doing relatively well denies any new issues he still has some intermittent bowel movement issues requires laxative complains of bilateral feet numbness but 1 globe and and stocking distribution rather in a radicular pattern.  Patient denies any new weakness    Review of patient's allergies indicates:  No Known Allergies    Current Outpatient Medications   Medication Sig Dispense Refill    aspirin (ECOTRIN) 81 MG EC tablet Take 81 mg by mouth once daily.      atorvastatin (LIPITOR) 40 MG tablet Take 1 tablet (40 mg total) by mouth once daily. 90 tablet 1    fenofibrate 160 MG Tab Take 1 tablet (160 mg total) by mouth once daily. 90 tablet 1    metoprolol succinate (TOPROL-XL) 25 MG 24 hr tablet Take 1 tablet (25 mg total) by mouth once daily. 90 tablet 1    tamsulosin (FLOMAX) 0.4 mg Cap Take 1 capsule (0.4 mg total) by mouth every evening. 90 capsule 1     No current facility-administered medications for this visit.        Past Medical History:   Diagnosis Date    Anemia     Coronary artery disease     Hyperlipidemia     Hypertension     Sacral chordoma      Past Surgical History:   Procedure Laterality Date    BACK SURGERY      CORONARY ARTERY BYPASS GRAFT      SPINE SURGERY      Dr Villatoro     Family History     None        Social History     Socioeconomic History    Marital status:      Spouse name: Not on file    Number of children: Not on file    Years of education: Not on file    Highest education level: Not on file   Occupational History    Not on file   Social Needs    Financial resource strain: Not on file    Food insecurity:     Worry: Not on file     Inability: Not on file    Transportation needs:      "Medical: Not on file     Non-medical: Not on file   Tobacco Use    Smoking status: Former Smoker     Packs/day: 2.00     Years: 25.00     Pack years: 50.00     Types: Cigarettes    Smokeless tobacco: Never Used    Tobacco comment: quit 1985   Substance and Sexual Activity    Alcohol use: No    Drug use: No    Sexual activity: Not on file   Lifestyle    Physical activity:     Days per week: Not on file     Minutes per session: Not on file    Stress: Not at all   Relationships    Social connections:     Talks on phone: Not on file     Gets together: Not on file     Attends Restorationist service: Not on file     Active member of club or organization: Not on file     Attends meetings of clubs or organizations: Not on file     Relationship status: Not on file   Other Topics Concern    Not on file   Social History Narrative    Not on file       Review of Systems:  Review of Systems   Constitutional: Negative.    HENT: Negative.    Eyes: Negative.    Respiratory: Negative.    Cardiovascular: Negative.    Gastrointestinal: Negative.    Endocrine: Negative.    Genitourinary: Negative.    Musculoskeletal: Negative.    Skin: Negative.    Allergic/Immunologic: Negative.    Neurological: Positive for numbness.   Hematological: Negative.    Psychiatric/Behavioral: Negative.        OBJECTIVE:     Vital Signs  Temp: 97.3 °F (36.3 °C)  Pulse: 64  BP: 137/65  Pain Score: 0-No pain  Height: 5' 9" (175.3 cm)  Weight: 79.8 kg (176 lb)  Body mass index is 25.99 kg/m².    Physical Exam:  Physical Exam:    Constitutional: He appears well-developed and well-nourished.     Eyes: Pupils are equal, round, and reactive to light. Conjunctivae and EOM are normal.     Musculoskeletal: Gait is normal.        Right Upper Extremities: Muscle strength is 5/5.        Left Upper Extremities: Muscle strength is 5/5.       Right Lower Extremities: Muscle strength is 5/5.        Left Lower Extremities: Muscle strength is 5/5.     Neurological:       "  DTRs: DTRs are DTRS NORMAL AND SYMMETRICnormal and symmetric.        Cranial nerves: Cranial nerve(s) II, III, IV, V, VI, VII, VIII, IX, X, XI and XII are intact.         Diagnostic Results:  MRI scan was reviewed overall the characteristics of the chordoma is stable there is no new growths or expansion.    ASSESSMENT/PLAN:     Overall patient history of sacral chordoma status post radiation treatment overall clinically and radiographically stable will continue to follow this with scans every other year unless patient has new symptoms.  I think his lower extremity complaints most likely peripheral neuropathy rather than root compression        Note dictated with voice recognition software, please excuse any grammatical errors.

## 2020-03-30 ENCOUNTER — TELEPHONE (OUTPATIENT)
Dept: FAMILY MEDICINE | Facility: CLINIC | Age: 80
End: 2020-03-30

## 2020-03-30 NOTE — TELEPHONE ENCOUNTER
Let pt/wife know it's okay for them to come in however make sure they are not having any symptoms and I recommend they have their labs drawn the same day to limit the amt of times they have to come in.

## 2020-03-30 NOTE — TELEPHONE ENCOUNTER
----- Message from Deepali Pereira sent at 3/30/2020 11:16 AM CDT -----  Contact: Pt wife   Pt has appt on 4-7-2020 he has declined virtual visit. Wants to come to his visit and have blood work done.  Is this ok?  # [phone]

## 2020-03-31 ENCOUNTER — OFFICE VISIT (OUTPATIENT)
Dept: FAMILY MEDICINE | Facility: CLINIC | Age: 80
End: 2020-03-31
Payer: MEDICARE

## 2020-03-31 VITALS
SYSTOLIC BLOOD PRESSURE: 120 MMHG | HEIGHT: 69 IN | DIASTOLIC BLOOD PRESSURE: 70 MMHG | BODY MASS INDEX: 26.22 KG/M2 | HEART RATE: 70 BPM | WEIGHT: 177 LBS

## 2020-03-31 DIAGNOSIS — C41.4 SACRAL CHORDOMA: ICD-10-CM

## 2020-03-31 DIAGNOSIS — I25.10 CORONARY ARTERY DISEASE INVOLVING NATIVE CORONARY ARTERY OF NATIVE HEART WITHOUT ANGINA PECTORIS: ICD-10-CM

## 2020-03-31 DIAGNOSIS — I10 ESSENTIAL HYPERTENSION: Primary | ICD-10-CM

## 2020-03-31 DIAGNOSIS — G62.9 PERIPHERAL POLYNEUROPATHY: ICD-10-CM

## 2020-03-31 DIAGNOSIS — E78.2 MIXED HYPERLIPIDEMIA: ICD-10-CM

## 2020-03-31 PROCEDURE — 99214 PR OFFICE/OUTPT VISIT, EST, LEVL IV, 30-39 MIN: ICD-10-PCS | Mod: S$GLB,,, | Performed by: NURSE PRACTITIONER

## 2020-03-31 PROCEDURE — 3074F PR MOST RECENT SYSTOLIC BLOOD PRESSURE < 130 MM HG: ICD-10-PCS | Mod: S$GLB,,, | Performed by: NURSE PRACTITIONER

## 2020-03-31 PROCEDURE — 1101F PT FALLS ASSESS-DOCD LE1/YR: CPT | Mod: S$GLB,,, | Performed by: NURSE PRACTITIONER

## 2020-03-31 PROCEDURE — 99214 OFFICE O/P EST MOD 30 MIN: CPT | Mod: S$GLB,,, | Performed by: NURSE PRACTITIONER

## 2020-03-31 PROCEDURE — 1159F PR MEDICATION LIST DOCUMENTED IN MEDICAL RECORD: ICD-10-PCS | Mod: S$GLB,,, | Performed by: NURSE PRACTITIONER

## 2020-03-31 PROCEDURE — 1159F MED LIST DOCD IN RCRD: CPT | Mod: S$GLB,,, | Performed by: NURSE PRACTITIONER

## 2020-03-31 PROCEDURE — 3074F SYST BP LT 130 MM HG: CPT | Mod: S$GLB,,, | Performed by: NURSE PRACTITIONER

## 2020-03-31 PROCEDURE — 3078F PR MOST RECENT DIASTOLIC BLOOD PRESSURE < 80 MM HG: ICD-10-PCS | Mod: S$GLB,,, | Performed by: NURSE PRACTITIONER

## 2020-03-31 PROCEDURE — 3078F DIAST BP <80 MM HG: CPT | Mod: S$GLB,,, | Performed by: NURSE PRACTITIONER

## 2020-03-31 PROCEDURE — 1101F PR PT FALLS ASSESS DOC 0-1 FALLS W/OUT INJ PAST YR: ICD-10-PCS | Mod: S$GLB,,, | Performed by: NURSE PRACTITIONER

## 2020-03-31 RX ORDER — ALBUTEROL SULFATE 90 UG/1
1-2 AEROSOL, METERED RESPIRATORY (INHALATION) EVERY 6 HOURS PRN
Qty: 18 G | Refills: 3 | Status: SHIPPED | OUTPATIENT
Start: 2020-03-31 | End: 2022-02-22

## 2020-03-31 NOTE — PROGRESS NOTES
SUBJECTIVE:    Patient ID: Nimisha Longoria is a 80 y.o. male.    Chief Complaint: Follow-up (No bottles - TK)    Pt here for regular f/u. Reports overall doing okay.  C/oing of chronic lower leg/foot numbness bilat- started after radiation treatment 2 years ago for sacral chordoma. Recently had f/u with Dr. Gonsales, NS and now having f/u imaging every 2 years. Pt denies actual pain/burning to feet, feet and lower legs are numb. Has a little issue with balance- denies any falls. Still driving- states installed a light near the floorboard so he can look at his feet and make sure they're on the right peddle. States Dr. Gonsales mentioned referring him to someone but they were never given a name. Was tried on gabapentin for a couple months but this didn't help numbness at all so stopped it.  Has seen Dr. Lowe for f/u CAD but now thinking he may need to find another cardiologist. Denies any issues with CP, SOB          Lab Visit on 01/14/2020   Component Date Value Ref Range Status    Creatinine 01/14/2020 0.9  0.5 - 1.4 mg/dL Final    eGFR if African American 01/14/2020 >60.0  >60 mL/min/1.73 m^2 Final    eGFR if non African American 01/14/2020 >60.0  >60 mL/min/1.73 m^2 Final   Hospital Outpatient Visit on 01/14/2020   Component Date Value Ref Range Status    POC Creatinine 01/14/2020 0.9  0.5 - 1.4 mg/dL Final    Sample 01/14/2020 VENOUS   Final       Past Medical History:   Diagnosis Date    Anemia     Coronary artery disease     Hyperlipidemia     Hypertension     Sacral chordoma      Past Surgical History:   Procedure Laterality Date    BACK SURGERY      CORONARY ARTERY BYPASS GRAFT      SPINE SURGERY      Dr Villatoro     History reviewed. No pertinent family history.    Marital Status:   Alcohol History:  reports that he does not drink alcohol.  Tobacco History:  reports that he has quit smoking. His smoking use included cigarettes. He has a 50.00 pack-year smoking history. He has never used  "smokeless tobacco.  Drug History:  reports that he does not use drugs.    Health Maintenance Topics with due status: Not Due       Topic Last Completion Date    TETANUS VACCINE 04/05/2016    Lipid Panel 09/27/2019    Aspirin/Antiplatelet Therapy 02/21/2020     Immunization History   Administered Date(s) Administered    Tdap 04/05/2016       Review of patient's allergies indicates:  No Known Allergies    Current Outpatient Medications:     aspirin (ECOTRIN) 81 MG EC tablet, Take 81 mg by mouth once daily., Disp: , Rfl:     atorvastatin (LIPITOR) 40 MG tablet, Take 1 tablet (40 mg total) by mouth once daily., Disp: 90 tablet, Rfl: 1    fenofibrate 160 MG Tab, Take 1 tablet (160 mg total) by mouth once daily., Disp: 90 tablet, Rfl: 1    metoprolol succinate (TOPROL-XL) 25 MG 24 hr tablet, Take 1 tablet (25 mg total) by mouth once daily., Disp: 90 tablet, Rfl: 1    tamsulosin (FLOMAX) 0.4 mg Cap, Take 1 capsule (0.4 mg total) by mouth every evening., Disp: 90 capsule, Rfl: 1    albuterol (PROAIR HFA) 90 mcg/actuation inhaler, Inhale 1-2 puffs into the lungs every 6 (six) hours as needed for Wheezing. Rescue, Disp: 18 g, Rfl: 3    Review of Systems   Constitutional: Negative for chills and fever.   Respiratory: Negative for cough, shortness of breath and wheezing.    Cardiovascular: Negative for chest pain, palpitations and leg swelling.   Gastrointestinal: Positive for constipation (occasional). Negative for abdominal pain and diarrhea.   Genitourinary: Negative for dysuria and hematuria.   Skin: Negative for rash.   Neurological: Positive for numbness (bilat feet up to knees bilat). Negative for dizziness and headaches.          Objective:      Vitals:    03/31/20 1400   BP: 120/70   Pulse: 70   Weight: 80.3 kg (177 lb)   Height: 5' 9" (1.753 m)     Physical Exam   Constitutional: He is oriented to person, place, and time. He appears well-developed and well-nourished.   HENT:   Head: Normocephalic and " atraumatic.   Right Ear: Tympanic membrane and ear canal normal.   Left Ear: Tympanic membrane and ear canal normal.   Mouth/Throat: Mucous membranes are normal. No posterior oropharyngeal erythema.   Neck: Neck supple. Carotid bruit is not present.   Cardiovascular: Normal rate and regular rhythm. Exam reveals no gallop and no friction rub.   No murmur heard.  Pulmonary/Chest: Effort normal and breath sounds normal. No respiratory distress. He has no wheezes. He has no rales.   Abdominal: Soft. He exhibits no distension. There is no tenderness.   Musculoskeletal: He exhibits edema (1+ pitting edema left ankle/foot).        Left ankle: He exhibits ecchymosis. He exhibits normal range of motion. No tenderness.        Feet:    Lymphadenopathy:     He has no cervical adenopathy.   Neurological: He is alert and oriented to person, place, and time. He has normal strength. Gait normal.   Skin: Skin is warm and dry. No rash noted.   Psychiatric: He has a normal mood and affect.   Nursing note and vitals reviewed.        Assessment:       1. Essential hypertension    2. Sacral chordoma    3. Mixed hyperlipidemia    4. Peripheral polyneuropathy    5. Coronary artery disease involving native coronary artery of native heart without angina pectoris           Plan:       Essential hypertension  -     CBC auto differential; Future; Expected date: 03/31/2020  -     Comprehensive metabolic panel; Future; Expected date: 03/31/2020  -     TSH w/reflex to FT4; Future; Expected date: 03/31/2020  -BP well controlled    Sacral chordoma  -stable on last imaging, f/u with Dr. Gonsales as scheduled    Mixed hyperlipidemia  -     Lipid panel; Future; Expected date: 03/31/2020  -recheck labs today    Peripheral polyneuropathy  -discussed NCS/EMG with pt and offered referral though pt would like to wait for now d/t COVID19 concerns. Cautioned on fall risks as well as concerns with driving if he cannot feel the position of his feet    Coronary  artery disease involving native coronary artery of native heart without angina pectoris  -stable, denies angina        Follow up in about 6 months (around 9/30/2020) for HTN; labwork today, Dr. Haddad next visit.        3/31/2020 Brandi Welsh NP

## 2020-04-01 LAB
ALBUMIN SERPL-MCNC: 4.4 G/DL (ref 3.6–5.1)
ALBUMIN/GLOB SERPL: 1.8 (CALC) (ref 1–2.5)
ALP SERPL-CCNC: 56 U/L (ref 35–144)
ALT SERPL-CCNC: 11 U/L (ref 9–46)
AST SERPL-CCNC: 19 U/L (ref 10–35)
BASOPHILS # BLD AUTO: 21 CELLS/UL (ref 0–200)
BASOPHILS NFR BLD AUTO: 0.3 %
BILIRUB SERPL-MCNC: 0.7 MG/DL (ref 0.2–1.2)
BUN SERPL-MCNC: 15 MG/DL (ref 7–25)
BUN/CREAT SERPL: NORMAL (CALC) (ref 6–22)
CALCIUM SERPL-MCNC: 9.7 MG/DL (ref 8.6–10.3)
CHLORIDE SERPL-SCNC: 102 MMOL/L (ref 98–110)
CHOLEST SERPL-MCNC: 148 MG/DL
CHOLEST/HDLC SERPL: 3.4 (CALC)
CO2 SERPL-SCNC: 32 MMOL/L (ref 20–32)
CREAT SERPL-MCNC: 1.06 MG/DL (ref 0.7–1.11)
EOSINOPHIL # BLD AUTO: 110 CELLS/UL (ref 15–500)
EOSINOPHIL NFR BLD AUTO: 1.6 %
ERYTHROCYTE [DISTWIDTH] IN BLOOD BY AUTOMATED COUNT: 12.8 % (ref 11–15)
GFRSERPLBLD MDRD-ARVRAT: 65 ML/MIN/1.73M2
GLOBULIN SER CALC-MCNC: 2.4 G/DL (CALC) (ref 1.9–3.7)
GLUCOSE SERPL-MCNC: 96 MG/DL (ref 65–139)
HCT VFR BLD AUTO: 38.3 % (ref 38.5–50)
HDLC SERPL-MCNC: 44 MG/DL
HGB BLD-MCNC: 12.9 G/DL (ref 13.2–17.1)
LDLC SERPL CALC-MCNC: 88 MG/DL (CALC)
LYMPHOCYTES # BLD AUTO: 890 CELLS/UL (ref 850–3900)
LYMPHOCYTES NFR BLD AUTO: 12.9 %
MCH RBC QN AUTO: 30.4 PG (ref 27–33)
MCHC RBC AUTO-ENTMCNC: 33.7 G/DL (ref 32–36)
MCV RBC AUTO: 90.1 FL (ref 80–100)
MONOCYTES # BLD AUTO: 511 CELLS/UL (ref 200–950)
MONOCYTES NFR BLD AUTO: 7.4 %
NEUTROPHILS # BLD AUTO: 5368 CELLS/UL (ref 1500–7800)
NEUTROPHILS NFR BLD AUTO: 77.8 %
NONHDLC SERPL-MCNC: 104 MG/DL (CALC)
PLATELET # BLD AUTO: 172 THOUSAND/UL (ref 140–400)
PMV BLD REES-ECKER: 11.5 FL (ref 7.5–12.5)
POTASSIUM SERPL-SCNC: 4.5 MMOL/L (ref 3.5–5.3)
PROT SERPL-MCNC: 6.8 G/DL (ref 6.1–8.1)
RBC # BLD AUTO: 4.25 MILLION/UL (ref 4.2–5.8)
SODIUM SERPL-SCNC: 139 MMOL/L (ref 135–146)
T4 FREE SERPL-MCNC: 1 NG/DL (ref 0.8–1.8)
TRIGL SERPL-MCNC: 72 MG/DL
TSH SERPL-ACNC: 4.61 MIU/L (ref 0.4–4.5)
WBC # BLD AUTO: 6.9 THOUSAND/UL (ref 3.8–10.8)

## 2020-04-01 NOTE — PROGRESS NOTES
Please call the pt and let him know overall labs look good. Very mild anemia on blood count which is stable. One thyroid level is just outside of normal range though normal free T4- recommend repeating TSH, free T4 in 3 months. Blood sugar, kidney and liver function within normal range. Cholesterol levels are well controlled- continue current medication.

## 2020-04-02 ENCOUNTER — TELEPHONE (OUTPATIENT)
Dept: FAMILY MEDICINE | Facility: CLINIC | Age: 80
End: 2020-04-02

## 2020-04-02 NOTE — TELEPHONE ENCOUNTER
From overdue results-called pt to encourage portal due to Covid-19. Spoke to patient's wife that we are encouraging use of portal. States that they don't have a computer or smart phone.

## 2020-04-02 NOTE — TELEPHONE ENCOUNTER
----- Message from Brandi Welsh NP sent at 4/1/2020  3:57 PM CDT -----  Please call the pt and let him know overall labs look good. Very mild anemia on blood count which is stable. One thyroid level is just outside of normal range though normal free T4- recommend repeating TSH, free T4 in 3 months. Blood sugar, kidney and liver function within normal range. Cholesterol levels are well controlled- continue current medication.

## 2020-05-25 ENCOUNTER — TELEPHONE (OUTPATIENT)
Dept: UROLOGY | Facility: CLINIC | Age: 80
End: 2020-05-25

## 2020-05-25 NOTE — TELEPHONE ENCOUNTER
Called pt to confirm appt, wife stated that  wouldn't be available to come to appt. She stated that they would call to reschedule when they know the next best date.

## 2020-07-07 ENCOUNTER — TELEPHONE (OUTPATIENT)
Dept: FAMILY MEDICINE | Facility: CLINIC | Age: 80
End: 2020-07-07

## 2020-07-07 DIAGNOSIS — R53.82 CHRONIC FATIGUE, UNSPECIFIED: ICD-10-CM

## 2020-07-07 DIAGNOSIS — R79.89 ABNORMAL TSH: ICD-10-CM

## 2020-07-07 DIAGNOSIS — R79.89 ABNORMAL THYROID BLOOD TEST: Primary | ICD-10-CM

## 2020-07-07 NOTE — TELEPHONE ENCOUNTER
----- Message from [facility], RT sent at 7/7/2020  1:01 PM CDT -----  Regarding: FW: needs labs in July  Ramandeep's remind me's    ----- Message -----  From: Ramandeep Fung  Sent: 7/3/2020  To: Ramandeep Fung  Subject: needs labs in July                               Notes recorded by Brandi Welsh NP on 4/1/2020 at 3:57 PM CDT  Please call the pt and let him know overall labs look good. Very mild anemia on blood count which is stable. One thyroid level is just outside of normal range though normal free T4- recommend repeating TSH, free T4 in 3 months. Blood sugar, kidney and liver function within normal range. Cholesterol levels are well controlled- continue current medication.

## 2020-07-07 NOTE — TELEPHONE ENCOUNTER
LMOR to call Ellen back so that I can let him know he is due for thyroid lab work.. Orders pended. Updated remind me.

## 2020-07-23 ENCOUNTER — TELEPHONE (OUTPATIENT)
Dept: FAMILY MEDICINE | Facility: CLINIC | Age: 80
End: 2020-07-23

## 2020-07-23 NOTE — TELEPHONE ENCOUNTER
LMOR that lab is due to recheck thyroid and he does not need to be fasting. Also mentioned he can come to our office and be sure to wear a mask. Updated remind me.

## 2020-07-23 NOTE — TELEPHONE ENCOUNTER
----- Message from [facility], RT sent at 7/7/2020  1:01 PM CDT -----  Regarding: FW: needs labs in July  Ramandeep's remind me's    ----- Message -----  From: Ramandeep Fung  Sent: 7/3/2020  To: Ramandeep Fung  Subject: needs labs in July                               Notes recorded by Brandi Welsh NP on 4/1/2020 at 3:57 PM CDT  Please call the pt and let him know overall labs look good. Very mild anemia on blood count which is stable. One thyroid level is just outside of normal range though normal free T4- recommend repeating TSH, free T4 in 3 months. Blood sugar, kidney and liver function within normal range. Cholesterol levels are well controlled- continue current medication.

## 2020-08-06 ENCOUNTER — TELEPHONE (OUTPATIENT)
Dept: FAMILY MEDICINE | Facility: CLINIC | Age: 80
End: 2020-08-06

## 2020-08-06 NOTE — TELEPHONE ENCOUNTER
----- Message from [facility], RT sent at 7/7/2020  1:01 PM CDT -----  Regarding: FW: needs labs in July  Ramandeep's remind me's    ----- Message -----  From: Ramandeep Fung  Sent: 7/3/2020  To: Ramandeep Fung  Subject: needs labs in July                               Notes recorded by Brandi Welsh NP on 4/1/2020 at 3:57 PM CDT  Please call the pt and let him know overall labs look good. Very mild anemia on blood count which is stable. One thyroid level is just outside of normal range though normal free T4- recommend repeating TSH, free T4 in 3 months. Blood sugar, kidney and liver function within normal range. Cholesterol levels are well controlled- continue current medication.   done

## 2020-08-06 NOTE — TELEPHONE ENCOUNTER
Spoke to wife that lab is due to recheck thyroid. Wants to know if he can wait until the end of September when he gets other lab drawn

## 2020-08-21 ENCOUNTER — OFFICE VISIT (OUTPATIENT)
Dept: DERMATOLOGY | Facility: CLINIC | Age: 80
End: 2020-08-21
Payer: MEDICARE

## 2020-08-21 DIAGNOSIS — L82.1 SEBORRHEIC KERATOSES: ICD-10-CM

## 2020-08-21 DIAGNOSIS — L57.0 ACTINIC KERATOSES: Primary | ICD-10-CM

## 2020-08-21 DIAGNOSIS — Z85.828 HISTORY OF NONMELANOMA SKIN CANCER: ICD-10-CM

## 2020-08-21 PROCEDURE — 1159F MED LIST DOCD IN RCRD: CPT | Mod: S$GLB,,, | Performed by: DERMATOLOGY

## 2020-08-21 PROCEDURE — 99999 PR PBB SHADOW E&M-EST. PATIENT-LVL II: ICD-10-PCS | Mod: PBBFAC,,, | Performed by: DERMATOLOGY

## 2020-08-21 PROCEDURE — 1101F PR PT FALLS ASSESS DOC 0-1 FALLS W/OUT INJ PAST YR: ICD-10-PCS | Mod: CPTII,S$GLB,, | Performed by: DERMATOLOGY

## 2020-08-21 PROCEDURE — 1159F PR MEDICATION LIST DOCUMENTED IN MEDICAL RECORD: ICD-10-PCS | Mod: S$GLB,,, | Performed by: DERMATOLOGY

## 2020-08-21 PROCEDURE — 99201 PR OFFICE/OUTPT VISIT,NEW,LEVL I: ICD-10-PCS | Mod: 25,S$GLB,, | Performed by: DERMATOLOGY

## 2020-08-21 PROCEDURE — 99201 PR OFFICE/OUTPT VISIT,NEW,LEVL I: CPT | Mod: 25,S$GLB,, | Performed by: DERMATOLOGY

## 2020-08-21 PROCEDURE — 1101F PT FALLS ASSESS-DOCD LE1/YR: CPT | Mod: CPTII,S$GLB,, | Performed by: DERMATOLOGY

## 2020-08-21 PROCEDURE — 17000 PR DESTRUCTION(LASER SURGERY,CRYOSURGERY,CHEMOSURGERY),PREMALIGNANT LESIONS,FIRST LESION: ICD-10-PCS | Mod: S$GLB,,, | Performed by: DERMATOLOGY

## 2020-08-21 PROCEDURE — 99999 PR PBB SHADOW E&M-EST. PATIENT-LVL II: CPT | Mod: PBBFAC,,, | Performed by: DERMATOLOGY

## 2020-08-21 PROCEDURE — 17000 DESTRUCT PREMALG LESION: CPT | Mod: S$GLB,,, | Performed by: DERMATOLOGY

## 2020-08-21 NOTE — PROGRESS NOTES
"  Subjective:       Patient ID:  Nimisha Longoria is a 80 y.o. male who presents for   Chief Complaint   Patient presents with    Scab     New Patient     80 y.o. male who presents for a scab in his L eyebrow - present for years - peels off and comes back - doesn't bleed  Mole - R arm - "a while" -  Bleeds when he hits it - would like removed if possible   Wishes me to examine exposed skin only    Derm HX:   phx of skin CA (unsure of what type it was, left forearm approx 2018, Alec)       Review of Systems   Constitutional: Negative for fever, chills and fatigue.   Respiratory: Negative for cough and shortness of breath.    Skin: Positive for activity-related sunscreen use and wears hat. Negative for itching, rash, dry skin, sun sensitivity and daily sunscreen use.   Hematologic/Lymphatic: Does not bruise/bleed easily.      Past Medical History:   Diagnosis Date    Anemia     Coronary artery disease     Hyperlipidemia     Hypertension     Sacral chordoma      Objective:    Physical Exam   Constitutional: He appears well-developed and well-nourished. No distress.   HENT:   Mouth/Throat: Lips normal.    Eyes: Lids are normal.    Neurological: He is alert and oriented to person, place, and time. He is not disoriented.   Psychiatric: He has a normal mood and affect. He is not agitated.   Skin:   Areas Examined (abnormalities noted in diagram):   Head / Face Inspection Performed  RUE Inspected  LUE Inspection Performed                   Diagram Legend     Erythematous scaling macule/papule c/w actinic keratosis       Vascular papule c/w angioma      Pigmented verrucoid papule/plaque c/w seborrheic keratosis      Yellow umbilicated papule c/w sebaceous hyperplasia      Irregularly shaped tan macule c/w lentigo     1-2 mm smooth white papules consistent with Milia      Movable subcutaneous cyst with punctum c/w epidermal inclusion cyst      Subcutaneous movable cyst c/w pilar cyst      Firm pink to brown papule " c/w dermatofibroma      Pedunculated fleshy papule(s) c/w skin tag(s)      Evenly pigmented macule c/w junctional nevus     Mildly variegated pigmented, slightly irregular-bordered macule c/w mildly atypical nevus      Flesh colored to evenly pigmented papule c/w intradermal nevus       Pink pearly papule/plaque c/w basal cell carcinoma      Erythematous hyperkeratotic cursted plaque c/w SCC      Surgical scar with no sign of skin cancer recurrence      Open and closed comedones      Inflammatory papules and pustules      Verrucoid papule consistent consistent with wart     Erythematous eczematous patches and plaques     Dystrophic onycholytic nail with subungual debris c/w onychomycosis     Umbilicated papule    Erythematous-base heme-crusted tan verrucoid plaque consistent with inflamed seborrheic keratosis     Erythematous Silvery Scaling Plaque c/w Psoriasis     See annotation      Assessment / Plan:        Actinic keratoses. Left lateral brow  Cryosurgery Procedure Note    Verbal consent from the patient is obtained and the patient is aware of the precancerous quality and need for treatment of these lesions. Liquid nitrogen cryosurgery is applied to the 1 actinic keratoses, as detailed in the physical exam, to produce a freeze injury. The patient is aware that blisters may form and is instructed on wound care with gentle cleansing and use of vaseline ointment to keep moist until healed. The patient is supplied a handout on cryosurgery and is instructed to call if lesions do not completely resolve.    Seborrheic keratoses  These are benign inherited growths without a malignant potential. Reassurance given to patient. No treatment is necessary.     History of nonmelanoma skin cancer  Left forearm  No clear scar identified  Patient declines UBSE today    Patient instructed in importance in daily sun protection of at least spf 30. Mineral sunscreen ingredients preferred (Zinc +/- Titanium).   Recommend Elta MD for  daily use on face and neck.  Patient encouraged to wear hat for all outdoor exposure.   Also discussed sun avoidance and use of protective clothing.               Follow up if symptoms worsen or fail to improve.

## 2020-08-21 NOTE — PATIENT INSTRUCTIONS

## 2020-09-08 DIAGNOSIS — I10 ESSENTIAL HYPERTENSION: ICD-10-CM

## 2020-09-08 RX ORDER — METOPROLOL SUCCINATE 25 MG/1
25 TABLET, EXTENDED RELEASE ORAL DAILY
Qty: 90 TABLET | Refills: 1 | Status: SHIPPED | OUTPATIENT
Start: 2020-09-08 | End: 2020-11-02 | Stop reason: SDUPTHER

## 2020-09-08 NOTE — TELEPHONE ENCOUNTER
----- Message from Deepali Pereira sent at 9/8/2020 11:00 AM CDT -----  Pt's wife requesting refill on Metoprolol to [facility] cb # [phone]

## 2020-09-17 ENCOUNTER — TELEPHONE (OUTPATIENT)
Dept: FAMILY MEDICINE | Facility: CLINIC | Age: 80
End: 2020-09-17

## 2020-09-17 NOTE — TELEPHONE ENCOUNTER
----- Message from [facility], RT sent at 7/7/2020  1:01 PM CDT -----  Regarding: FW: needs labs in July  Ramandeep's remind me's    ----- Message -----  From: Ramandeep Fung  Sent: 7/3/2020  To: Ramandeep Fung  Subject: needs labs in July                               Notes recorded by Brandi Welsh NP on 4/1/2020 at 3:57 PM CDT  Please call the pt and let him know overall labs look good. Very mild anemia on blood count which is stable. One thyroid level is just outside of normal range though normal free T4- recommend repeating TSH, free T4 in 3 months. Blood sugar, kidney and liver function within normal range. Cholesterol levels are well controlled- continue current medication.

## 2020-09-17 NOTE — TELEPHONE ENCOUNTER
Spoke to patient that lab is due to recheck thyroid. States he is coming on 9/27 to get our lab and another physician lab. Updated remind me.

## 2020-09-23 ENCOUNTER — TELEPHONE (OUTPATIENT)
Dept: FAMILY MEDICINE | Facility: CLINIC | Age: 80
End: 2020-09-23

## 2020-09-25 LAB
T4 FREE SERPL-MCNC: 1 NG/DL (ref 0.8–1.8)
TSH SERPL-ACNC: 5.22 MIU/L (ref 0.4–4.5)

## 2020-09-29 ENCOUNTER — TELEPHONE (OUTPATIENT)
Dept: FAMILY MEDICINE | Facility: CLINIC | Age: 80
End: 2020-09-29

## 2020-09-29 DIAGNOSIS — R79.89 ABNORMAL THYROID BLOOD TEST: Primary | ICD-10-CM

## 2020-09-29 RX ORDER — LEVOTHYROXINE SODIUM 50 UG/1
50 TABLET ORAL
Qty: 90 TABLET | Refills: 0 | Status: SHIPPED | OUTPATIENT
Start: 2020-09-29 | End: 2020-11-02 | Stop reason: SDUPTHER

## 2020-09-29 NOTE — TELEPHONE ENCOUNTER
----- Message from Zackery Haddad MD sent at 9/27/2020  2:56 PM CDT -----  Thyroid function still looks a little underactive.  We will try a low-dose levothyroxine 50 mcg once a day before breakfast.  Recheck TSH, CMP, lipids, CBC in 3 months

## 2020-09-29 NOTE — TELEPHONE ENCOUNTER
Spoke to patient with results verbatim per Dr Haddad. Verbalized understanding on all, including that thyroid medication was being sent to [facility] and that we will call when repeat lab is due in 3 months. Remind me created. Allergies and pharmacy verified. Order pended.

## 2020-09-30 ENCOUNTER — OFFICE VISIT (OUTPATIENT)
Dept: FAMILY MEDICINE | Facility: CLINIC | Age: 80
End: 2020-09-30
Payer: MEDICARE

## 2020-09-30 VITALS
BODY MASS INDEX: 26.81 KG/M2 | WEIGHT: 181 LBS | DIASTOLIC BLOOD PRESSURE: 64 MMHG | SYSTOLIC BLOOD PRESSURE: 126 MMHG | HEIGHT: 69 IN | TEMPERATURE: 98 F | HEART RATE: 56 BPM

## 2020-09-30 DIAGNOSIS — E03.9 ACQUIRED HYPOTHYROIDISM: ICD-10-CM

## 2020-09-30 DIAGNOSIS — I25.10 CORONARY ARTERY DISEASE INVOLVING NATIVE CORONARY ARTERY OF NATIVE HEART WITHOUT ANGINA PECTORIS: ICD-10-CM

## 2020-09-30 DIAGNOSIS — E78.2 MIXED HYPERLIPIDEMIA: Primary | ICD-10-CM

## 2020-09-30 DIAGNOSIS — I10 ESSENTIAL HYPERTENSION: ICD-10-CM

## 2020-09-30 DIAGNOSIS — C41.4 SACRAL CHORDOMA: ICD-10-CM

## 2020-09-30 PROCEDURE — 1159F PR MEDICATION LIST DOCUMENTED IN MEDICAL RECORD: ICD-10-PCS | Mod: S$GLB,,, | Performed by: NURSE PRACTITIONER

## 2020-09-30 PROCEDURE — 3074F SYST BP LT 130 MM HG: CPT | Mod: S$GLB,,, | Performed by: NURSE PRACTITIONER

## 2020-09-30 PROCEDURE — 3074F PR MOST RECENT SYSTOLIC BLOOD PRESSURE < 130 MM HG: ICD-10-PCS | Mod: S$GLB,,, | Performed by: NURSE PRACTITIONER

## 2020-09-30 PROCEDURE — 1101F PT FALLS ASSESS-DOCD LE1/YR: CPT | Mod: S$GLB,,, | Performed by: NURSE PRACTITIONER

## 2020-09-30 PROCEDURE — 1101F PR PT FALLS ASSESS DOC 0-1 FALLS W/OUT INJ PAST YR: ICD-10-PCS | Mod: S$GLB,,, | Performed by: NURSE PRACTITIONER

## 2020-09-30 PROCEDURE — 3078F DIAST BP <80 MM HG: CPT | Mod: S$GLB,,, | Performed by: NURSE PRACTITIONER

## 2020-09-30 PROCEDURE — 3078F PR MOST RECENT DIASTOLIC BLOOD PRESSURE < 80 MM HG: ICD-10-PCS | Mod: S$GLB,,, | Performed by: NURSE PRACTITIONER

## 2020-09-30 PROCEDURE — 99214 PR OFFICE/OUTPT VISIT, EST, LEVL IV, 30-39 MIN: ICD-10-PCS | Mod: S$GLB,,, | Performed by: NURSE PRACTITIONER

## 2020-09-30 PROCEDURE — 99214 OFFICE O/P EST MOD 30 MIN: CPT | Mod: S$GLB,,, | Performed by: NURSE PRACTITIONER

## 2020-09-30 PROCEDURE — 1159F MED LIST DOCD IN RCRD: CPT | Mod: S$GLB,,, | Performed by: NURSE PRACTITIONER

## 2020-09-30 NOTE — PROGRESS NOTES
SUBJECTIVE:    Patient ID: Nimisha Longoria is a 80 y.o. male.    Chief Complaint: Hypertension (no bottles, FLU and PNA declined// SW)    Patient reports overall doing well.  No complaints today.  Follows every 2 years with Dr. Gonsales, neurosurgery for sacral chordoma.  Patient complains of some neuropathy symptoms in both feet, thinks it could be from the radiation treatments.  Does not take anything for pain and states moving around seems to help with the pain.  Denies any leg weakness or falls      Telephone on 07/07/2020   Component Date Value Ref Range Status    TSH 09/24/2020 5.22* 0.40 - 4.50 mIU/L Final    T4, Free 09/24/2020 1.0  0.8 - 1.8 ng/dL Final   Office Visit on 03/31/2020   Component Date Value Ref Range Status    WBC 03/31/2020 6.9  3.8 - 10.8 Thousand/uL Final    RBC 03/31/2020 4.25  4.20 - 5.80 Million/uL Final    Hemoglobin 03/31/2020 12.9* 13.2 - 17.1 g/dL Final    Hematocrit 03/31/2020 38.3* 38.5 - 50.0 % Final    Mean Corpuscular Volume 03/31/2020 90.1  80.0 - 100.0 fL Final    Mean Corpuscular Hemoglobin 03/31/2020 30.4  27.0 - 33.0 pg Final    Mean Corpuscular Hemoglobin Conc 03/31/2020 33.7  32.0 - 36.0 g/dL Final    RDW 03/31/2020 12.8  11.0 - 15.0 % Final    Platelets 03/31/2020 172  140 - 400 Thousand/uL Final    MPV 03/31/2020 11.5  7.5 - 12.5 fL Final    Neutrophils Absolute 03/31/2020 5,368  1,500 - 7,800 cells/uL Final    Lymph # 03/31/2020 890  850 - 3,900 cells/uL Final    Mono # 03/31/2020 511  200 - 950 cells/uL Final    Eos # 03/31/2020 110  15 - 500 cells/uL Final    Baso # 03/31/2020 21  0 - 200 cells/uL Final    Neutrophils Relative 03/31/2020 77.8  % Final    Lymph% 03/31/2020 12.9  % Final    Mono% 03/31/2020 7.4  % Final    Eosinophil% 03/31/2020 1.6  % Final    Basophil% 03/31/2020 0.3  % Final    Glucose 03/31/2020 96  65 - 139 mg/dL Final    BUN, Bld 03/31/2020 15  7 - 25 mg/dL Final    Creatinine 03/31/2020 1.06  0.70 - 1.11 mg/dL Final     eGFR if non African American 03/31/2020 65  > OR = 60 mL/min/1.73m2 Final    eGFR if African American 03/31/2020 76  > OR = 60 mL/min/1.73m2 Final    BUN/Creatinine Ratio 03/31/2020 NOT APPLICABLE  6 - 22 (calc) Final    Sodium 03/31/2020 139  135 - 146 mmol/L Final    Potassium 03/31/2020 4.5  3.5 - 5.3 mmol/L Final    Chloride 03/31/2020 102  98 - 110 mmol/L Final    CO2 03/31/2020 32  20 - 32 mmol/L Final    Calcium 03/31/2020 9.7  8.6 - 10.3 mg/dL Final    Total Protein 03/31/2020 6.8  6.1 - 8.1 g/dL Final    Albumin 03/31/2020 4.4  3.6 - 5.1 g/dL Final    Globulin, Total 03/31/2020 2.4  1.9 - 3.7 g/dL (calc) Final    Albumin/Globulin Ratio 03/31/2020 1.8  1.0 - 2.5 (calc) Final    Total Bilirubin 03/31/2020 0.7  0.2 - 1.2 mg/dL Final    Alkaline Phosphatase 03/31/2020 56  35 - 144 U/L Final    AST 03/31/2020 19  10 - 35 U/L Final    ALT 03/31/2020 11  9 - 46 U/L Final    Cholesterol 03/31/2020 148  <200 mg/dL Final    HDL 03/31/2020 44  > OR = 40 mg/dL Final    Triglycerides 03/31/2020 72  <150 mg/dL Final    LDL Cholesterol 03/31/2020 88  mg/dL (calc) Final    Hdl/Cholesterol Ratio 03/31/2020 3.4  <5.0 (calc) Final    Non HDL Chol. (LDL+VLDL) 03/31/2020 104  <130 mg/dL (calc) Final    TSH w/reflex to FT4 03/31/2020 4.61* 0.40 - 4.50 mIU/L Final    T4, Free 03/31/2020 1.0  0.8 - 1.8 ng/dL Final       Past Medical History:   Diagnosis Date    Anemia     Coronary artery disease     Hyperlipidemia     Hypertension     Sacral chordoma      Past Surgical History:   Procedure Laterality Date    BACK SURGERY      CORONARY ARTERY BYPASS GRAFT      SPINE SURGERY      Dr Villatoro     History reviewed. No pertinent family history.    Marital Status:   Alcohol History:  reports no history of alcohol use.  Tobacco History:  reports that he has quit smoking. His smoking use included cigarettes. He has a 50.00 pack-year smoking history. He has never used smokeless tobacco.  Drug  History:  reports no history of drug use.    Health Maintenance Topics with due status: Not Due       Topic Last Completion Date    TETANUS VACCINE 04/05/2016    Lipid Panel 03/31/2020    Aspirin/Antiplatelet Therapy 08/21/2020     Immunization History   Administered Date(s) Administered    Tdap 04/05/2016       Review of patient's allergies indicates:  No Known Allergies    Current Outpatient Medications:     aspirin (ECOTRIN) 81 MG EC tablet, Take 81 mg by mouth once daily., Disp: , Rfl:     fenofibrate 160 MG Tab, Take 1 tablet (160 mg total) by mouth once daily., Disp: 90 tablet, Rfl: 1    levothyroxine (SYNTHROID) 50 MCG tablet, Take 1 tablet (50 mcg total) by mouth before breakfast. First thing in the morning before breakfast. For thyroid., Disp: 90 tablet, Rfl: 0    metoprolol succinate (TOPROL-XL) 25 MG 24 hr tablet, Take 1 tablet (25 mg total) by mouth once daily., Disp: 90 tablet, Rfl: 1    tamsulosin (FLOMAX) 0.4 mg Cap, Take 1 capsule (0.4 mg total) by mouth every evening., Disp: 90 capsule, Rfl: 1    albuterol (PROAIR HFA) 90 mcg/actuation inhaler, Inhale 1-2 puffs into the lungs every 6 (six) hours as needed for Wheezing. Rescue (Patient not taking: Reported on 9/30/2020), Disp: 18 g, Rfl: 3    atorvastatin (LIPITOR) 40 MG tablet, Take 1 tablet (40 mg total) by mouth once daily., Disp: 90 tablet, Rfl: 1    Review of Systems   Constitutional: Negative for chills and fever.   HENT: Positive for hearing loss. Negative for sore throat and trouble swallowing.    Eyes: Negative for visual disturbance.   Respiratory: Negative for cough, shortness of breath and wheezing.    Cardiovascular: Negative for chest pain, palpitations and leg swelling.   Gastrointestinal: Positive for constipation (occasional). Negative for abdominal pain and diarrhea.   Genitourinary: Negative for dysuria and hematuria.   Musculoskeletal: Negative for back pain and gait problem.   Skin: Negative for rash.   Neurological:  "Positive for numbness (bilat feet up to knees bilat). Negative for dizziness and headaches.   Psychiatric/Behavioral: Negative for dysphoric mood.          Objective:      Vitals:    09/30/20 1356   BP: 126/64   Pulse: (!) 56   Temp: 98.3 °F (36.8 °C)   Weight: 82.1 kg (181 lb)   Height: 5' 9" (1.753 m)     Physical Exam  Vitals signs and nursing note reviewed.   Constitutional:       General: He is not in acute distress.     Appearance: Normal appearance. He is well-developed and normal weight.   HENT:      Head: Normocephalic and atraumatic.      Right Ear: Tympanic membrane and ear canal normal.      Left Ear: There is impacted cerumen.      Mouth/Throat:      Mouth: Mucous membranes are moist.      Pharynx: No posterior oropharyngeal erythema.   Neck:      Musculoskeletal: Neck supple.      Vascular: No carotid bruit.   Cardiovascular:      Rate and Rhythm: Normal rate and regular rhythm.      Heart sounds: No murmur. No friction rub. No gallop.    Pulmonary:      Effort: Pulmonary effort is normal. No respiratory distress.      Breath sounds: Normal breath sounds. No wheezing or rales.   Abdominal:      General: There is no distension.      Palpations: Abdomen is soft.      Tenderness: There is no abdominal tenderness.   Musculoskeletal:      Left ankle: He exhibits ecchymosis. He exhibits normal range of motion. No tenderness.      Right lower leg: No edema.      Left lower leg: No edema.   Lymphadenopathy:      Cervical: No cervical adenopathy.   Skin:     General: Skin is warm and dry.      Findings: No rash.   Neurological:      General: No focal deficit present.      Mental Status: He is alert and oriented to person, place, and time.      Gait: Gait normal.   Psychiatric:         Mood and Affect: Mood normal.           Assessment:       1. Mixed hyperlipidemia    2. Acquired hypothyroidism    3. Essential hypertension    4. Sacral chordoma    5. Coronary artery disease involving native coronary artery of " native heart without angina pectoris           Plan:       Mixed hyperlipidemia  Comments:  Well controlled on last labs    Acquired hypothyroidism  Comments:  Levothyroxine recently added, repeat labs in 3 months    Essential hypertension  Comments:  BP well controlled    Sacral chordoma  Comments:  Stable monitored by Neurosurgery    Coronary artery disease involving native coronary artery of native heart without angina pectoris  Comments:  Stable, denies angina.  Reports he used to see Dr. Dejesus though has not seen him a couple years, wife states they will call and schedule appointment      Follow up in about 6 months (around 3/30/2021) for thyroid, lipids.        9/30/2020 Brandi Welsh NP

## 2020-11-02 ENCOUNTER — CLINICAL SUPPORT (OUTPATIENT)
Dept: FAMILY MEDICINE | Facility: CLINIC | Age: 80
End: 2020-11-02
Payer: MEDICARE

## 2020-11-02 DIAGNOSIS — R79.89 ABNORMAL THYROID BLOOD TEST: ICD-10-CM

## 2020-11-02 DIAGNOSIS — I10 ESSENTIAL HYPERTENSION: ICD-10-CM

## 2020-11-02 DIAGNOSIS — E78.5 HYPERLIPIDEMIA, UNSPECIFIED HYPERLIPIDEMIA TYPE: ICD-10-CM

## 2020-11-02 DIAGNOSIS — N40.0 BENIGN PROSTATIC HYPERPLASIA, UNSPECIFIED WHETHER LOWER URINARY TRACT SYMPTOMS PRESENT: ICD-10-CM

## 2020-11-02 RX ORDER — TAMSULOSIN HYDROCHLORIDE 0.4 MG/1
0.4 CAPSULE ORAL NIGHTLY
Qty: 90 CAPSULE | Refills: 1 | Status: SHIPPED | OUTPATIENT
Start: 2020-11-02 | End: 2021-03-31 | Stop reason: SDUPTHER

## 2020-11-02 RX ORDER — FENOFIBRATE 160 MG/1
160 TABLET ORAL DAILY
Qty: 90 TABLET | Refills: 1 | Status: SHIPPED | OUTPATIENT
Start: 2020-11-02 | End: 2021-03-31 | Stop reason: SDUPTHER

## 2020-11-02 RX ORDER — LEVOTHYROXINE SODIUM 50 UG/1
50 TABLET ORAL
Qty: 90 TABLET | Refills: 1 | Status: SHIPPED | OUTPATIENT
Start: 2020-11-02 | End: 2021-03-31 | Stop reason: SDUPTHER

## 2020-11-02 RX ORDER — METOPROLOL SUCCINATE 25 MG/1
25 TABLET, EXTENDED RELEASE ORAL DAILY
Qty: 90 TABLET | Refills: 1 | Status: SHIPPED | OUTPATIENT
Start: 2020-11-02 | End: 2021-03-31 | Stop reason: SDUPTHER

## 2020-11-02 RX ORDER — ATORVASTATIN CALCIUM 40 MG/1
40 TABLET, FILM COATED ORAL DAILY
Qty: 90 TABLET | Refills: 1 | Status: SHIPPED | OUTPATIENT
Start: 2020-11-02 | End: 2021-03-31 | Stop reason: SDUPTHER

## 2020-11-02 NOTE — PROGRESS NOTES
Pts wife here in office for med rx's for pt. Brought bottles. Only marked the ones pts wife brought in as taking  Temp. 98.1

## 2020-12-04 ENCOUNTER — TELEPHONE (OUTPATIENT)
Dept: FAMILY MEDICINE | Facility: CLINIC | Age: 80
End: 2020-12-04

## 2020-12-04 NOTE — TELEPHONE ENCOUNTER
----- Message from Damián Llanos sent at 12/4/2020 11:28 AM CST -----  Regarding: Refill  Contact: Nimisha Longoria  Needs refill on Levothyroxine   Send to [facility]  Pt# [phone]

## 2020-12-17 ENCOUNTER — TELEPHONE (OUTPATIENT)
Dept: FAMILY MEDICINE | Facility: CLINIC | Age: 80
End: 2020-12-17

## 2020-12-17 DIAGNOSIS — Z79.899 ENCOUNTER FOR LONG-TERM (CURRENT) USE OF OTHER MEDICATIONS: ICD-10-CM

## 2020-12-17 DIAGNOSIS — R79.89 ABNORMAL THYROID BLOOD TEST: Primary | ICD-10-CM

## 2020-12-17 DIAGNOSIS — D64.9 ANEMIA, UNSPECIFIED TYPE: ICD-10-CM

## 2020-12-17 DIAGNOSIS — E03.9 ACQUIRED HYPOTHYROIDISM: ICD-10-CM

## 2020-12-17 DIAGNOSIS — R53.82 CHRONIC FATIGUE, UNSPECIFIED: ICD-10-CM

## 2020-12-17 DIAGNOSIS — I10 ESSENTIAL HYPERTENSION: ICD-10-CM

## 2020-12-17 DIAGNOSIS — E78.5 HYPERLIPIDEMIA, UNSPECIFIED HYPERLIPIDEMIA TYPE: ICD-10-CM

## 2020-12-17 NOTE — TELEPHONE ENCOUNTER
----- Message from [facility], RT sent at 9/29/2020 10:04 AM CDT -----  Regarding: Lab due  Zackery Haddad MD   9/27/2020  2:56 PM    Thyroid function still looks a little underactive.  We will try a low-dose levothyroxine 50 mcg once a day before breakfast.  Recheck TSH, CMP, lipids, CBC in 3 months

## 2020-12-23 ENCOUNTER — TELEPHONE (OUTPATIENT)
Dept: FAMILY MEDICINE | Facility: CLINIC | Age: 80
End: 2020-12-23

## 2020-12-23 NOTE — TELEPHONE ENCOUNTER
Spoke to wife, Karie, that fasting lab work is due. She said he will come after Romina. Updated remind me.

## 2020-12-23 NOTE — TELEPHONE ENCOUNTER
----- Message from [facility], RT sent at 9/29/2020 10:04 AM CDT -----  Regarding: Lab due  Zackery Haddad MD   9/27/2020  2:56 PM    Thyroid function still looks a little underactive.  We will try a low-dose levothyroxine 50 mcg once a day before breakfast.  Recheck TSH, CMP, lipids, CBC in 3 months

## 2020-12-30 LAB
ALBUMIN SERPL-MCNC: 4.4 G/DL (ref 3.6–5.1)
ALBUMIN/GLOB SERPL: 1.9 (CALC) (ref 1–2.5)
ALP SERPL-CCNC: 52 U/L (ref 35–144)
ALT SERPL-CCNC: 12 U/L (ref 9–46)
AST SERPL-CCNC: 17 U/L (ref 10–35)
BILIRUB SERPL-MCNC: 0.6 MG/DL (ref 0.2–1.2)
BUN SERPL-MCNC: 15 MG/DL (ref 7–25)
BUN/CREAT SERPL: NORMAL (CALC) (ref 6–22)
CALCIUM SERPL-MCNC: 9.5 MG/DL (ref 8.6–10.3)
CHLORIDE SERPL-SCNC: 105 MMOL/L (ref 98–110)
CHOLEST SERPL-MCNC: 167 MG/DL
CHOLEST/HDLC SERPL: 3.7 (CALC)
CO2 SERPL-SCNC: 29 MMOL/L (ref 20–32)
CREAT SERPL-MCNC: 0.9 MG/DL (ref 0.7–1.11)
GFRSERPLBLD MDRD-ARVRAT: 80 ML/MIN/1.73M2
GLOBULIN SER CALC-MCNC: 2.3 G/DL (CALC) (ref 1.9–3.7)
GLUCOSE SERPL-MCNC: 97 MG/DL (ref 65–99)
HDLC SERPL-MCNC: 45 MG/DL
LDLC SERPL CALC-MCNC: 103 MG/DL (CALC)
NONHDLC SERPL-MCNC: 122 MG/DL (CALC)
POTASSIUM SERPL-SCNC: 4.2 MMOL/L (ref 3.5–5.3)
PROT SERPL-MCNC: 6.7 G/DL (ref 6.1–8.1)
SODIUM SERPL-SCNC: 141 MMOL/L (ref 135–146)
TRIGL SERPL-MCNC: 93 MG/DL
TSH SERPL-ACNC: 4.38 MIU/L (ref 0.4–4.5)

## 2021-01-04 ENCOUNTER — TELEPHONE (OUTPATIENT)
Dept: FAMILY MEDICINE | Facility: CLINIC | Age: 81
End: 2021-01-04

## 2021-01-04 NOTE — TELEPHONE ENCOUNTER
Spoke to patient with results verbatim per Dr Haddad. Verbalized understanding on all. Would like results faxed to [facility], did that.

## 2021-01-17 PROBLEM — D63.8 ANEMIA, CHRONIC DISEASE: Status: ACTIVE | Noted: 2021-01-17

## 2021-01-17 PROBLEM — D64.89 ANEMIA DUE TO MULTIPLE MECHANISMS: Status: ACTIVE | Noted: 2021-01-17

## 2021-01-17 PROBLEM — D64.9 NORMOCHROMIC NORMOCYTIC ANEMIA: Status: ACTIVE | Noted: 2021-01-17

## 2021-01-17 PROBLEM — R77.8 ABNORMAL SPEP: Status: ACTIVE | Noted: 2021-01-17

## 2021-01-18 ENCOUNTER — OFFICE VISIT (OUTPATIENT)
Dept: HEMATOLOGY/ONCOLOGY | Facility: CLINIC | Age: 81
End: 2021-01-18
Payer: MEDICARE

## 2021-01-18 VITALS
SYSTOLIC BLOOD PRESSURE: 151 MMHG | TEMPERATURE: 98 F | HEIGHT: 68 IN | WEIGHT: 183.13 LBS | RESPIRATION RATE: 18 BRPM | DIASTOLIC BLOOD PRESSURE: 69 MMHG | BODY MASS INDEX: 27.76 KG/M2 | HEART RATE: 71 BPM

## 2021-01-18 DIAGNOSIS — R77.8 ABNORMAL SPEP: ICD-10-CM

## 2021-01-18 DIAGNOSIS — D50.9 IRON DEFICIENCY ANEMIA, UNSPECIFIED IRON DEFICIENCY ANEMIA TYPE: Primary | ICD-10-CM

## 2021-01-18 DIAGNOSIS — D63.8 ANEMIA, CHRONIC DISEASE: ICD-10-CM

## 2021-01-18 DIAGNOSIS — D64.9 NORMOCHROMIC NORMOCYTIC ANEMIA: ICD-10-CM

## 2021-01-18 DIAGNOSIS — D64.9 ANEMIA, UNSPECIFIED TYPE: ICD-10-CM

## 2021-01-18 DIAGNOSIS — D64.89 ANEMIA DUE TO MULTIPLE MECHANISMS: ICD-10-CM

## 2021-01-18 PROCEDURE — 3078F PR MOST RECENT DIASTOLIC BLOOD PRESSURE < 80 MM HG: ICD-10-PCS | Mod: S$GLB,,, | Performed by: INTERNAL MEDICINE

## 2021-01-18 PROCEDURE — 1159F MED LIST DOCD IN RCRD: CPT | Mod: S$GLB,,, | Performed by: INTERNAL MEDICINE

## 2021-01-18 PROCEDURE — 1126F PR PAIN SEVERITY QUANTIFIED, NO PAIN PRESENT: ICD-10-PCS | Mod: S$GLB,,, | Performed by: INTERNAL MEDICINE

## 2021-01-18 PROCEDURE — 3077F SYST BP >= 140 MM HG: CPT | Mod: S$GLB,,, | Performed by: INTERNAL MEDICINE

## 2021-01-18 PROCEDURE — 3077F PR MOST RECENT SYSTOLIC BLOOD PRESSURE >= 140 MM HG: ICD-10-PCS | Mod: S$GLB,,, | Performed by: INTERNAL MEDICINE

## 2021-01-18 PROCEDURE — 1126F AMNT PAIN NOTED NONE PRSNT: CPT | Mod: S$GLB,,, | Performed by: INTERNAL MEDICINE

## 2021-01-18 PROCEDURE — 99203 PR OFFICE/OUTPT VISIT, NEW, LEVL III, 30-44 MIN: ICD-10-PCS | Mod: S$GLB,,, | Performed by: INTERNAL MEDICINE

## 2021-01-18 PROCEDURE — 3078F DIAST BP <80 MM HG: CPT | Mod: S$GLB,,, | Performed by: INTERNAL MEDICINE

## 2021-01-18 PROCEDURE — 3288F PR FALLS RISK ASSESSMENT DOCUMENTED: ICD-10-PCS | Mod: S$GLB,,, | Performed by: INTERNAL MEDICINE

## 2021-01-18 PROCEDURE — 99203 OFFICE O/P NEW LOW 30 MIN: CPT | Mod: S$GLB,,, | Performed by: INTERNAL MEDICINE

## 2021-01-18 PROCEDURE — 1101F PT FALLS ASSESS-DOCD LE1/YR: CPT | Mod: S$GLB,,, | Performed by: INTERNAL MEDICINE

## 2021-01-18 PROCEDURE — 3288F FALL RISK ASSESSMENT DOCD: CPT | Mod: S$GLB,,, | Performed by: INTERNAL MEDICINE

## 2021-01-18 PROCEDURE — 1101F PR PT FALLS ASSESS DOC 0-1 FALLS W/OUT INJ PAST YR: ICD-10-PCS | Mod: S$GLB,,, | Performed by: INTERNAL MEDICINE

## 2021-01-18 PROCEDURE — 1159F PR MEDICATION LIST DOCUMENTED IN MEDICAL RECORD: ICD-10-PCS | Mod: S$GLB,,, | Performed by: INTERNAL MEDICINE

## 2021-01-21 LAB
ALBUMIN SERPL-MCNC: 4.5 G/DL (ref 3.6–5.1)
ALBUMIN/GLOB SERPL: 1.9 (CALC) (ref 1–2.5)
ALP SERPL-CCNC: 52 U/L (ref 35–144)
ALT SERPL-CCNC: 15 U/L (ref 9–46)
AST SERPL-CCNC: 22 U/L (ref 10–35)
B2 MICROGLOB SERPL-MCNC: 1.83 MG/L
BASOPHILS # BLD AUTO: 21 CELLS/UL (ref 0–200)
BASOPHILS NFR BLD AUTO: 0.4 %
BILIRUB SERPL-MCNC: 0.5 MG/DL (ref 0.2–1.2)
BUN SERPL-MCNC: 19 MG/DL (ref 7–25)
BUN/CREAT SERPL: NORMAL (CALC) (ref 6–22)
CALCIUM SERPL-MCNC: 9.6 MG/DL (ref 8.6–10.3)
CHLORIDE SERPL-SCNC: 104 MMOL/L (ref 98–110)
CO2 SERPL-SCNC: 27 MMOL/L (ref 20–32)
CREAT SERPL-MCNC: 0.98 MG/DL (ref 0.7–1.11)
EOSINOPHIL # BLD AUTO: 88 CELLS/UL (ref 15–500)
EOSINOPHIL NFR BLD AUTO: 1.7 %
ERYTHROCYTE [DISTWIDTH] IN BLOOD BY AUTOMATED COUNT: 12.9 % (ref 11–15)
FERRITIN SERPL-MCNC: 221 NG/ML (ref 24–380)
GFRSERPLBLD MDRD-ARVRAT: 72 ML/MIN/1.73M2
GLOBULIN SER CALC-MCNC: 2.4 G/DL (CALC) (ref 1.9–3.7)
GLUCOSE SERPL-MCNC: 97 MG/DL (ref 65–139)
HCT VFR BLD AUTO: 40.4 % (ref 38.5–50)
HGB BLD-MCNC: 13.2 G/DL (ref 13.2–17.1)
IGA SERPL-MCNC: 155 MG/DL (ref 70–320)
IGG SERPL-MCNC: 788 MG/DL (ref 600–1540)
IGM SERPL-MCNC: 132 MG/DL (ref 50–300)
IRON SATN MFR SERPL: 26 % (CALC) (ref 20–48)
IRON SERPL-MCNC: 99 MCG/DL (ref 50–180)
KAPPA LC FREE SER-MCNC: 23.4 MG/L (ref 3.3–19.4)
KAPPA LC FREE/LAMBDA FREE SER: 1.86 {RATIO} (ref 0.26–1.65)
LAMBDA LC FREE SERPL-MCNC: 12.6 MG/L (ref 5.7–26.3)
LYMPHOCYTES # BLD AUTO: 1102 CELLS/UL (ref 850–3900)
LYMPHOCYTES NFR BLD AUTO: 21.2 %
MCH RBC QN AUTO: 29.9 PG (ref 27–33)
MCHC RBC AUTO-ENTMCNC: 32.7 G/DL (ref 32–36)
MCV RBC AUTO: 91.6 FL (ref 80–100)
MONOCYTES # BLD AUTO: 478 CELLS/UL (ref 200–950)
MONOCYTES NFR BLD AUTO: 9.2 %
NEUTROPHILS # BLD AUTO: 3510 CELLS/UL (ref 1500–7800)
NEUTROPHILS NFR BLD AUTO: 67.5 %
PLATELET # BLD AUTO: 173 THOUSAND/UL (ref 140–400)
PMV BLD REES-ECKER: 11.7 FL (ref 7.5–12.5)
POTASSIUM SERPL-SCNC: 4.4 MMOL/L (ref 3.5–5.3)
PROT SERPL-MCNC: 6.9 G/DL (ref 6.1–8.1)
RBC # BLD AUTO: 4.41 MILLION/UL (ref 4.2–5.8)
SODIUM SERPL-SCNC: 142 MMOL/L (ref 135–146)
TIBC SERPL-MCNC: 385 MCG/DL (CALC) (ref 250–425)
WBC # BLD AUTO: 5.2 THOUSAND/UL (ref 3.8–10.8)

## 2021-02-09 ENCOUNTER — IMMUNIZATION (OUTPATIENT)
Dept: FAMILY MEDICINE | Facility: CLINIC | Age: 81
End: 2021-02-09
Payer: MEDICARE

## 2021-02-09 DIAGNOSIS — Z23 NEED FOR VACCINATION: Primary | ICD-10-CM

## 2021-02-09 PROCEDURE — 91300 COVID-19, MRNA, LNP-S, PF, 30 MCG/0.3 ML DOSE VACCINE: CPT | Mod: PBBFAC | Performed by: FAMILY MEDICINE

## 2021-02-23 ENCOUNTER — OFFICE VISIT (OUTPATIENT)
Dept: HEMATOLOGY/ONCOLOGY | Facility: CLINIC | Age: 81
End: 2021-02-23
Payer: MEDICARE

## 2021-02-23 ENCOUNTER — TELEPHONE (OUTPATIENT)
Dept: HEMATOLOGY/ONCOLOGY | Facility: CLINIC | Age: 81
End: 2021-02-23

## 2021-02-23 VITALS
SYSTOLIC BLOOD PRESSURE: 152 MMHG | HEIGHT: 68 IN | RESPIRATION RATE: 18 BRPM | WEIGHT: 181 LBS | HEART RATE: 66 BPM | DIASTOLIC BLOOD PRESSURE: 67 MMHG | BODY MASS INDEX: 27.43 KG/M2

## 2021-02-23 DIAGNOSIS — D47.2 MGUS (MONOCLONAL GAMMOPATHY OF UNKNOWN SIGNIFICANCE): ICD-10-CM

## 2021-02-23 DIAGNOSIS — D63.8 ANEMIA, CHRONIC DISEASE: ICD-10-CM

## 2021-02-23 DIAGNOSIS — D64.9 NORMOCHROMIC NORMOCYTIC ANEMIA: Primary | ICD-10-CM

## 2021-02-23 DIAGNOSIS — D64.9 ANEMIA, UNSPECIFIED TYPE: ICD-10-CM

## 2021-02-23 DIAGNOSIS — D50.9 IRON DEFICIENCY ANEMIA, UNSPECIFIED IRON DEFICIENCY ANEMIA TYPE: ICD-10-CM

## 2021-02-23 DIAGNOSIS — D64.89 ANEMIA DUE TO MULTIPLE MECHANISMS: ICD-10-CM

## 2021-02-23 DIAGNOSIS — E53.8 B12 DEFICIENCY: ICD-10-CM

## 2021-02-23 DIAGNOSIS — R77.8 ABNORMAL SPEP: ICD-10-CM

## 2021-02-23 DIAGNOSIS — C41.2 CHORDOMA: ICD-10-CM

## 2021-02-23 PROCEDURE — 3288F FALL RISK ASSESSMENT DOCD: CPT | Mod: S$GLB,,, | Performed by: INTERNAL MEDICINE

## 2021-02-23 PROCEDURE — 3078F PR MOST RECENT DIASTOLIC BLOOD PRESSURE < 80 MM HG: ICD-10-PCS | Mod: S$GLB,,, | Performed by: INTERNAL MEDICINE

## 2021-02-23 PROCEDURE — 1159F PR MEDICATION LIST DOCUMENTED IN MEDICAL RECORD: ICD-10-PCS | Mod: S$GLB,,, | Performed by: INTERNAL MEDICINE

## 2021-02-23 PROCEDURE — 3077F SYST BP >= 140 MM HG: CPT | Mod: S$GLB,,, | Performed by: INTERNAL MEDICINE

## 2021-02-23 PROCEDURE — 1126F PR PAIN SEVERITY QUANTIFIED, NO PAIN PRESENT: ICD-10-PCS | Mod: S$GLB,,, | Performed by: INTERNAL MEDICINE

## 2021-02-23 PROCEDURE — 1101F PR PT FALLS ASSESS DOC 0-1 FALLS W/OUT INJ PAST YR: ICD-10-PCS | Mod: S$GLB,,, | Performed by: INTERNAL MEDICINE

## 2021-02-23 PROCEDURE — 99215 PR OFFICE/OUTPT VISIT, EST, LEVL V, 40-54 MIN: ICD-10-PCS | Mod: S$GLB,,, | Performed by: INTERNAL MEDICINE

## 2021-02-23 PROCEDURE — 99215 OFFICE O/P EST HI 40 MIN: CPT | Mod: S$GLB,,, | Performed by: INTERNAL MEDICINE

## 2021-02-23 PROCEDURE — 1101F PT FALLS ASSESS-DOCD LE1/YR: CPT | Mod: S$GLB,,, | Performed by: INTERNAL MEDICINE

## 2021-02-23 PROCEDURE — 1126F AMNT PAIN NOTED NONE PRSNT: CPT | Mod: S$GLB,,, | Performed by: INTERNAL MEDICINE

## 2021-02-23 PROCEDURE — 3288F PR FALLS RISK ASSESSMENT DOCUMENTED: ICD-10-PCS | Mod: S$GLB,,, | Performed by: INTERNAL MEDICINE

## 2021-02-23 PROCEDURE — 3077F PR MOST RECENT SYSTOLIC BLOOD PRESSURE >= 140 MM HG: ICD-10-PCS | Mod: S$GLB,,, | Performed by: INTERNAL MEDICINE

## 2021-02-23 PROCEDURE — 3078F DIAST BP <80 MM HG: CPT | Mod: S$GLB,,, | Performed by: INTERNAL MEDICINE

## 2021-02-23 PROCEDURE — 1159F MED LIST DOCD IN RCRD: CPT | Mod: S$GLB,,, | Performed by: INTERNAL MEDICINE

## 2021-03-02 ENCOUNTER — IMMUNIZATION (OUTPATIENT)
Dept: FAMILY MEDICINE | Facility: CLINIC | Age: 81
End: 2021-03-02
Payer: MEDICARE

## 2021-03-02 DIAGNOSIS — Z23 NEED FOR VACCINATION: Primary | ICD-10-CM

## 2021-03-02 PROCEDURE — 0002A COVID-19, MRNA, LNP-S, PF, 30 MCG/0.3 ML DOSE VACCINE: CPT | Mod: CV19,,, | Performed by: INTERNAL MEDICINE

## 2021-03-02 PROCEDURE — 91300 COVID-19, MRNA, LNP-S, PF, 30 MCG/0.3 ML DOSE VACCINE: ICD-10-PCS | Mod: ,,, | Performed by: INTERNAL MEDICINE

## 2021-03-02 PROCEDURE — 0002A COVID-19, MRNA, LNP-S, PF, 30 MCG/0.3 ML DOSE VACCINE: ICD-10-PCS | Mod: CV19,,, | Performed by: INTERNAL MEDICINE

## 2021-03-02 PROCEDURE — 91300 COVID-19, MRNA, LNP-S, PF, 30 MCG/0.3 ML DOSE VACCINE: CPT | Mod: ,,, | Performed by: INTERNAL MEDICINE

## 2021-03-31 ENCOUNTER — OFFICE VISIT (OUTPATIENT)
Dept: FAMILY MEDICINE | Facility: CLINIC | Age: 81
End: 2021-03-31
Payer: MEDICARE

## 2021-03-31 VITALS
SYSTOLIC BLOOD PRESSURE: 122 MMHG | HEIGHT: 68 IN | HEART RATE: 70 BPM | WEIGHT: 177 LBS | BODY MASS INDEX: 26.83 KG/M2 | DIASTOLIC BLOOD PRESSURE: 70 MMHG

## 2021-03-31 DIAGNOSIS — D47.2 MGUS (MONOCLONAL GAMMOPATHY OF UNKNOWN SIGNIFICANCE): ICD-10-CM

## 2021-03-31 DIAGNOSIS — I10 ESSENTIAL HYPERTENSION: Primary | ICD-10-CM

## 2021-03-31 DIAGNOSIS — E03.9 ACQUIRED HYPOTHYROIDISM: ICD-10-CM

## 2021-03-31 DIAGNOSIS — C41.4 SACRAL CHORDOMA: ICD-10-CM

## 2021-03-31 DIAGNOSIS — N40.0 BENIGN PROSTATIC HYPERPLASIA, UNSPECIFIED WHETHER LOWER URINARY TRACT SYMPTOMS PRESENT: ICD-10-CM

## 2021-03-31 DIAGNOSIS — E78.2 MIXED HYPERLIPIDEMIA: ICD-10-CM

## 2021-03-31 DIAGNOSIS — G62.9 PERIPHERAL POLYNEUROPATHY: ICD-10-CM

## 2021-03-31 PROCEDURE — 3078F PR MOST RECENT DIASTOLIC BLOOD PRESSURE < 80 MM HG: ICD-10-PCS | Mod: S$GLB,,, | Performed by: NURSE PRACTITIONER

## 2021-03-31 PROCEDURE — 99214 PR OFFICE/OUTPT VISIT, EST, LEVL IV, 30-39 MIN: ICD-10-PCS | Mod: S$GLB,,, | Performed by: NURSE PRACTITIONER

## 2021-03-31 PROCEDURE — 3078F DIAST BP <80 MM HG: CPT | Mod: S$GLB,,, | Performed by: NURSE PRACTITIONER

## 2021-03-31 PROCEDURE — 3288F FALL RISK ASSESSMENT DOCD: CPT | Mod: S$GLB,,, | Performed by: NURSE PRACTITIONER

## 2021-03-31 PROCEDURE — 3288F PR FALLS RISK ASSESSMENT DOCUMENTED: ICD-10-PCS | Mod: S$GLB,,, | Performed by: NURSE PRACTITIONER

## 2021-03-31 PROCEDURE — 3074F SYST BP LT 130 MM HG: CPT | Mod: S$GLB,,, | Performed by: NURSE PRACTITIONER

## 2021-03-31 PROCEDURE — 99214 OFFICE O/P EST MOD 30 MIN: CPT | Mod: S$GLB,,, | Performed by: NURSE PRACTITIONER

## 2021-03-31 PROCEDURE — 1159F MED LIST DOCD IN RCRD: CPT | Mod: S$GLB,,, | Performed by: NURSE PRACTITIONER

## 2021-03-31 PROCEDURE — 1101F PR PT FALLS ASSESS DOC 0-1 FALLS W/OUT INJ PAST YR: ICD-10-PCS | Mod: S$GLB,,, | Performed by: NURSE PRACTITIONER

## 2021-03-31 PROCEDURE — 1159F PR MEDICATION LIST DOCUMENTED IN MEDICAL RECORD: ICD-10-PCS | Mod: S$GLB,,, | Performed by: NURSE PRACTITIONER

## 2021-03-31 PROCEDURE — 3074F PR MOST RECENT SYSTOLIC BLOOD PRESSURE < 130 MM HG: ICD-10-PCS | Mod: S$GLB,,, | Performed by: NURSE PRACTITIONER

## 2021-03-31 PROCEDURE — 1101F PT FALLS ASSESS-DOCD LE1/YR: CPT | Mod: S$GLB,,, | Performed by: NURSE PRACTITIONER

## 2021-03-31 RX ORDER — TAMSULOSIN HYDROCHLORIDE 0.4 MG/1
0.4 CAPSULE ORAL NIGHTLY
Qty: 90 CAPSULE | Refills: 1 | Status: SHIPPED | OUTPATIENT
Start: 2021-03-31 | End: 2022-04-05 | Stop reason: SDUPTHER

## 2021-03-31 RX ORDER — METOPROLOL SUCCINATE 25 MG/1
25 TABLET, EXTENDED RELEASE ORAL DAILY
Qty: 90 TABLET | Refills: 1 | Status: SHIPPED | OUTPATIENT
Start: 2021-03-31 | End: 2022-04-05 | Stop reason: SDUPTHER

## 2021-03-31 RX ORDER — ATORVASTATIN CALCIUM 40 MG/1
40 TABLET, FILM COATED ORAL DAILY
Qty: 90 TABLET | Refills: 1 | Status: SHIPPED | OUTPATIENT
Start: 2021-03-31 | End: 2024-01-03

## 2021-03-31 RX ORDER — FENOFIBRATE 160 MG/1
160 TABLET ORAL DAILY
Qty: 90 TABLET | Refills: 1 | Status: SHIPPED | OUTPATIENT
Start: 2021-03-31 | End: 2022-04-05 | Stop reason: SDUPTHER

## 2021-03-31 RX ORDER — LEVOTHYROXINE SODIUM 50 UG/1
50 TABLET ORAL
Qty: 90 TABLET | Refills: 1 | Status: SHIPPED | OUTPATIENT
Start: 2021-03-31 | End: 2022-04-05 | Stop reason: SDUPTHER

## 2021-03-31 RX ORDER — ALBUTEROL SULFATE 90 UG/1
1-2 AEROSOL, METERED RESPIRATORY (INHALATION) EVERY 6 HOURS PRN
Qty: 18 G | Refills: 3 | Status: CANCELLED | OUTPATIENT
Start: 2021-03-31

## 2021-06-08 DIAGNOSIS — G62.9 PERIPHERAL POLYNEUROPATHY: Primary | ICD-10-CM

## 2021-06-08 RX ORDER — BETAMETHASONE DIPROPIONATE 0.5 MG/G
CREAM TOPICAL 2 TIMES DAILY
Qty: 45 G | Refills: 2 | Status: SHIPPED | OUTPATIENT
Start: 2021-06-08 | End: 2024-01-03

## 2021-06-08 RX ORDER — ALCOHOL 2.38 KG/3.79L
1 GEL TOPICAL 2 TIMES DAILY
Qty: 60 CAPSULE | Refills: 5 | Status: SHIPPED | OUTPATIENT
Start: 2021-06-08 | End: 2021-09-30 | Stop reason: SDUPTHER

## 2021-06-08 RX ORDER — CLOTRIMAZOLE 1 %
CREAM (GRAM) TOPICAL 2 TIMES DAILY
Qty: 45 G | Refills: 2 | Status: ON HOLD | OUTPATIENT
Start: 2021-06-08 | End: 2023-04-08 | Stop reason: CLARIF

## 2021-09-21 ENCOUNTER — TELEPHONE (OUTPATIENT)
Dept: FAMILY MEDICINE | Facility: CLINIC | Age: 81
End: 2021-09-21

## 2021-09-21 DIAGNOSIS — E03.9 ACQUIRED HYPOTHYROIDISM: ICD-10-CM

## 2021-09-21 DIAGNOSIS — I10 ESSENTIAL HYPERTENSION: ICD-10-CM

## 2021-09-21 DIAGNOSIS — Z79.899 ENCOUNTER FOR LONG-TERM (CURRENT) USE OF OTHER MEDICATIONS: Primary | ICD-10-CM

## 2021-09-21 DIAGNOSIS — E78.2 MIXED HYPERLIPIDEMIA: ICD-10-CM

## 2021-09-22 LAB
ALBUMIN SERPL-MCNC: 4 G/DL (ref 3.6–5.1)
ALBUMIN/GLOB SERPL: 1.7 (CALC) (ref 1–2.5)
ALP SERPL-CCNC: 43 U/L (ref 35–144)
ALT SERPL-CCNC: 21 U/L (ref 9–46)
AST SERPL-CCNC: 23 U/L (ref 10–35)
BASOPHILS # BLD AUTO: 20 CELLS/UL (ref 0–200)
BASOPHILS NFR BLD AUTO: 0.4 %
BILIRUB SERPL-MCNC: 0.7 MG/DL (ref 0.2–1.2)
BUN SERPL-MCNC: 18 MG/DL (ref 7–25)
BUN/CREAT SERPL: ABNORMAL (CALC) (ref 6–22)
CALCIUM SERPL-MCNC: 9 MG/DL (ref 8.6–10.3)
CHLORIDE SERPL-SCNC: 107 MMOL/L (ref 98–110)
CHOLEST SERPL-MCNC: 149 MG/DL
CHOLEST/HDLC SERPL: 3.2 (CALC)
CO2 SERPL-SCNC: 26 MMOL/L (ref 20–32)
CREAT SERPL-MCNC: 0.92 MG/DL (ref 0.7–1.11)
EOSINOPHIL # BLD AUTO: 49 CELLS/UL (ref 15–500)
EOSINOPHIL NFR BLD AUTO: 1 %
ERYTHROCYTE [DISTWIDTH] IN BLOOD BY AUTOMATED COUNT: 12.9 % (ref 11–15)
GLOBULIN SER CALC-MCNC: 2.3 G/DL (CALC) (ref 1.9–3.7)
GLUCOSE SERPL-MCNC: 102 MG/DL (ref 65–99)
HCT VFR BLD AUTO: 36.4 % (ref 38.5–50)
HDLC SERPL-MCNC: 47 MG/DL
HGB BLD-MCNC: 12.4 G/DL (ref 13.2–17.1)
LDLC SERPL CALC-MCNC: 87 MG/DL (CALC)
LYMPHOCYTES # BLD AUTO: 931 CELLS/UL (ref 850–3900)
LYMPHOCYTES NFR BLD AUTO: 19 %
MCH RBC QN AUTO: 31.1 PG (ref 27–33)
MCHC RBC AUTO-ENTMCNC: 34.1 G/DL (ref 32–36)
MCV RBC AUTO: 91.2 FL (ref 80–100)
MONOCYTES # BLD AUTO: 397 CELLS/UL (ref 200–950)
MONOCYTES NFR BLD AUTO: 8.1 %
NEUTROPHILS # BLD AUTO: 3504 CELLS/UL (ref 1500–7800)
NEUTROPHILS NFR BLD AUTO: 71.5 %
NONHDLC SERPL-MCNC: 102 MG/DL (CALC)
PLATELET # BLD AUTO: 172 THOUSAND/UL (ref 140–400)
PMV BLD REES-ECKER: 11.3 FL (ref 7.5–12.5)
POTASSIUM SERPL-SCNC: 4 MMOL/L (ref 3.5–5.3)
PROT SERPL-MCNC: 6.3 G/DL (ref 6.1–8.1)
RBC # BLD AUTO: 3.99 MILLION/UL (ref 4.2–5.8)
SODIUM SERPL-SCNC: 141 MMOL/L (ref 135–146)
TRIGL SERPL-MCNC: 65 MG/DL
TSH SERPL-ACNC: 2.83 MIU/L (ref 0.4–4.5)
WBC # BLD AUTO: 4.9 THOUSAND/UL (ref 3.8–10.8)

## 2021-09-30 ENCOUNTER — OFFICE VISIT (OUTPATIENT)
Dept: FAMILY MEDICINE | Facility: CLINIC | Age: 81
End: 2021-09-30
Payer: MEDICARE

## 2021-09-30 VITALS
DIASTOLIC BLOOD PRESSURE: 62 MMHG | HEART RATE: 76 BPM | SYSTOLIC BLOOD PRESSURE: 122 MMHG | BODY MASS INDEX: 25.64 KG/M2 | HEIGHT: 68 IN | WEIGHT: 169.19 LBS

## 2021-09-30 DIAGNOSIS — D64.9 ANEMIA, UNSPECIFIED TYPE: ICD-10-CM

## 2021-09-30 DIAGNOSIS — G62.9 PERIPHERAL POLYNEUROPATHY: ICD-10-CM

## 2021-09-30 DIAGNOSIS — I25.10 CORONARY ARTERY DISEASE INVOLVING NATIVE CORONARY ARTERY OF NATIVE HEART WITHOUT ANGINA PECTORIS: ICD-10-CM

## 2021-09-30 DIAGNOSIS — E78.2 MIXED HYPERLIPIDEMIA: ICD-10-CM

## 2021-09-30 DIAGNOSIS — I10 ESSENTIAL HYPERTENSION: ICD-10-CM

## 2021-09-30 DIAGNOSIS — K59.00 CONSTIPATION, UNSPECIFIED CONSTIPATION TYPE: Primary | ICD-10-CM

## 2021-09-30 DIAGNOSIS — K42.9 UMBILICAL HERNIA WITHOUT OBSTRUCTION AND WITHOUT GANGRENE: ICD-10-CM

## 2021-09-30 PROCEDURE — 99214 PR OFFICE/OUTPT VISIT, EST, LEVL IV, 30-39 MIN: ICD-10-PCS | Mod: S$GLB,,, | Performed by: FAMILY MEDICINE

## 2021-09-30 PROCEDURE — 3078F DIAST BP <80 MM HG: CPT | Mod: S$GLB,,, | Performed by: FAMILY MEDICINE

## 2021-09-30 PROCEDURE — 3078F PR MOST RECENT DIASTOLIC BLOOD PRESSURE < 80 MM HG: ICD-10-PCS | Mod: S$GLB,,, | Performed by: FAMILY MEDICINE

## 2021-09-30 PROCEDURE — 1159F MED LIST DOCD IN RCRD: CPT | Mod: S$GLB,,, | Performed by: FAMILY MEDICINE

## 2021-09-30 PROCEDURE — 1160F PR REVIEW ALL MEDS BY PRESCRIBER/CLIN PHARMACIST DOCUMENTED: ICD-10-PCS | Mod: S$GLB,,, | Performed by: FAMILY MEDICINE

## 2021-09-30 PROCEDURE — 1160F RVW MEDS BY RX/DR IN RCRD: CPT | Mod: S$GLB,,, | Performed by: FAMILY MEDICINE

## 2021-09-30 PROCEDURE — 3074F PR MOST RECENT SYSTOLIC BLOOD PRESSURE < 130 MM HG: ICD-10-PCS | Mod: S$GLB,,, | Performed by: FAMILY MEDICINE

## 2021-09-30 PROCEDURE — 99214 OFFICE O/P EST MOD 30 MIN: CPT | Mod: S$GLB,,, | Performed by: FAMILY MEDICINE

## 2021-09-30 PROCEDURE — 3074F SYST BP LT 130 MM HG: CPT | Mod: S$GLB,,, | Performed by: FAMILY MEDICINE

## 2021-09-30 PROCEDURE — 1159F PR MEDICATION LIST DOCUMENTED IN MEDICAL RECORD: ICD-10-PCS | Mod: S$GLB,,, | Performed by: FAMILY MEDICINE

## 2021-09-30 RX ORDER — ALCOHOL 2.38 KG/3.79L
1 GEL TOPICAL 2 TIMES DAILY
Qty: 60 CAPSULE | Refills: 5 | Status: SHIPPED | OUTPATIENT
Start: 2021-09-30 | End: 2022-04-05

## 2022-01-26 ENCOUNTER — TELEPHONE (OUTPATIENT)
Dept: FAMILY MEDICINE | Facility: CLINIC | Age: 82
End: 2022-01-26
Payer: MEDICARE

## 2022-01-26 NOTE — TELEPHONE ENCOUNTER
Spoke with pt and advised he call the pharmacy as we have not sent any prescriptions in for him since September.

## 2022-01-26 NOTE — TELEPHONE ENCOUNTER
----- Message from Adry Flores sent at 1/26/2022  2:56 PM CST -----  Patient called and stated that he will not need to take the METANX and he stated that someone called to order a refill but he does not take them he does not know who to call to stop having them refilled please give him a call back at [phone]

## 2022-02-15 DIAGNOSIS — C41.2 CHORDOMA: Primary | ICD-10-CM

## 2022-02-22 ENCOUNTER — OFFICE VISIT (OUTPATIENT)
Dept: ORTHOPEDICS | Facility: CLINIC | Age: 82
End: 2022-02-22
Payer: MEDICARE

## 2022-02-22 DIAGNOSIS — M75.41 IMPINGEMENT SYNDROME OF SHOULDER, RIGHT: Primary | ICD-10-CM

## 2022-02-22 PROCEDURE — 20610 DRAIN/INJ JOINT/BURSA W/O US: CPT | Mod: RT,S$GLB,, | Performed by: ORTHOPAEDIC SURGERY

## 2022-02-22 PROCEDURE — 3288F PR FALLS RISK ASSESSMENT DOCUMENTED: ICD-10-PCS | Mod: S$GLB,,, | Performed by: ORTHOPAEDIC SURGERY

## 2022-02-22 PROCEDURE — 1160F RVW MEDS BY RX/DR IN RCRD: CPT | Mod: S$GLB,,, | Performed by: ORTHOPAEDIC SURGERY

## 2022-02-22 PROCEDURE — 1159F PR MEDICATION LIST DOCUMENTED IN MEDICAL RECORD: ICD-10-PCS | Mod: S$GLB,,, | Performed by: ORTHOPAEDIC SURGERY

## 2022-02-22 PROCEDURE — 99203 OFFICE O/P NEW LOW 30 MIN: CPT | Mod: 25,S$GLB,, | Performed by: ORTHOPAEDIC SURGERY

## 2022-02-22 PROCEDURE — 3288F FALL RISK ASSESSMENT DOCD: CPT | Mod: S$GLB,,, | Performed by: ORTHOPAEDIC SURGERY

## 2022-02-22 PROCEDURE — 1160F PR REVIEW ALL MEDS BY PRESCRIBER/CLIN PHARMACIST DOCUMENTED: ICD-10-PCS | Mod: S$GLB,,, | Performed by: ORTHOPAEDIC SURGERY

## 2022-02-22 PROCEDURE — 99203 PR OFFICE/OUTPT VISIT, NEW, LEVL III, 30-44 MIN: ICD-10-PCS | Mod: 25,S$GLB,, | Performed by: ORTHOPAEDIC SURGERY

## 2022-02-22 PROCEDURE — 1159F MED LIST DOCD IN RCRD: CPT | Mod: S$GLB,,, | Performed by: ORTHOPAEDIC SURGERY

## 2022-02-22 PROCEDURE — 1100F PR PT FALLS ASSESS DOC 2+ FALLS/FALL W/INJURY/YR: ICD-10-PCS | Mod: S$GLB,,, | Performed by: ORTHOPAEDIC SURGERY

## 2022-02-22 PROCEDURE — 1126F AMNT PAIN NOTED NONE PRSNT: CPT | Mod: S$GLB,,, | Performed by: ORTHOPAEDIC SURGERY

## 2022-02-22 PROCEDURE — 1126F PR PAIN SEVERITY QUANTIFIED, NO PAIN PRESENT: ICD-10-PCS | Mod: S$GLB,,, | Performed by: ORTHOPAEDIC SURGERY

## 2022-02-22 PROCEDURE — 1100F PTFALLS ASSESS-DOCD GE2>/YR: CPT | Mod: S$GLB,,, | Performed by: ORTHOPAEDIC SURGERY

## 2022-02-22 PROCEDURE — 20610 LARGE JOINT ASPIRATION/INJECTION: R SUBACROMIAL BURSA: ICD-10-PCS | Mod: RT,S$GLB,, | Performed by: ORTHOPAEDIC SURGERY

## 2022-02-22 RX ORDER — TRIAMCINOLONE ACETONIDE 40 MG/ML
40 INJECTION, SUSPENSION INTRA-ARTICULAR; INTRAMUSCULAR
Status: DISCONTINUED | OUTPATIENT
Start: 2022-02-22 | End: 2022-02-22 | Stop reason: HOSPADM

## 2022-02-22 RX ADMIN — TRIAMCINOLONE ACETONIDE 40 MG: 40 INJECTION, SUSPENSION INTRA-ARTICULAR; INTRAMUSCULAR at 12:02

## 2022-02-22 NOTE — PROGRESS NOTES
[facility]    Subjective:     Chief Complaint:   Chief Complaint   Patient presents with    Right Shoulder - Pain     Pt is here with complaints of Rt. Shoulder pain X 2 months, working on lawnmower and ROM is ok.        Past Medical History:   Diagnosis Date    Abnormal SPEP 1/17/2021    Anemia     Anemia due to multiple mechanisms 1/17/2021    Anemia, chronic disease 1/17/2021    Coronary artery disease     Hyperlipidemia     Hypertension     Normochromic normocytic anemia 1/17/2021    Sacral chordoma        Past Surgical History:   Procedure Laterality Date    BACK SURGERY      CORONARY ARTERY BYPASS GRAFT      SPINE SURGERY      Dr Villatoro       Current Outpatient Medications   Medication Sig    aspirin (ECOTRIN) 81 MG EC tablet Take 162 mg by mouth once daily.     atorvastatin (LIPITOR) 40 MG tablet Take 1 tablet (40 mg total) by mouth once daily.    betamethasone dipropionate 0.05 % cream Apply topically 2 (two) times daily.    clotrimazole (LOTRIMIN) 1 % cream Apply topically 2 (two) times daily.    fenofibrate 160 MG Tab Take 1 tablet (160 mg total) by mouth once daily.    levothyroxine (SYNTHROID) 50 MCG tablet Take 1 tablet (50 mcg total) by mouth before breakfast. First thing in the morning before breakfast. For thyroid.    metoprolol succinate (TOPROL-XL) 25 MG 24 hr tablet Take 1 tablet (25 mg total) by mouth once daily.    tamsulosin (FLOMAX) 0.4 mg Cap Take 1 capsule (0.4 mg total) by mouth every evening.    albuterol (PROAIR HFA) 90 mcg/actuation inhaler Inhale 1-2 puffs into the lungs every 6 (six) hours as needed for Wheezing. Rescue (Patient not taking: No sig reported)    [id number]-algal oil (METANX/FOLTANX RF) 3 mg-35 mg-2 mg -90.314 mg Cap Take 1 capsule by mouth 2 (two) times daily. (Patient not taking: Reported on 2/22/2022)     No current facility-administered medications for this visit.       Review of patient's allergies indicates:  No Known  Allergies    No family history on file.    Social History     Socioeconomic History    Marital status:    Tobacco Use    Smoking status: Former Smoker     Packs/day: 2.00     Years: 25.00     Pack years: 50.00     Types: Cigarettes    Smokeless tobacco: Never Used    Tobacco comment: quit 1985   Substance and Sexual Activity    Alcohol use: No    Drug use: No       History of present illness:  Patient comes in today as a new patient with chief complaint of right shoulder pain for approximately 3 months.  He reports the pain began after using a socket wrench while working on a lawnmower.  He denies any specific injury or trauma.  He has not tried any treatment.      Review of Systems:    Constitution: Negative for chills, fever, and sweats.  Negative for unexplained weight loss.    HENT:  Negative for headaches and blurry vision.    Cardiovascular:Negative for chest pain or irregular heart beat. Negative for hypertension.    Respiratory:  Negative for cough and shortness of breath.    Gastrointestinal: Negative for abdominal pain, heartburn, melena, nausea, and vomitting.    Genitourinary:  Negative bladder incontinence and dysuria.    Musculoskeletal:  See HPI for details.     Neurological: Negative for numbness.    Psychiatric/Behavioral: Negative for depression.  The patient is not nervous/anxious.      Endocrine: Negative for polyuria    Hematologic/Lymphatic: Negative for bleeding problem.  Does not bruise/bleed easily.    Skin: Negative for poor would healing and rash    Objective:      Physical Examination:    Vital Signs:  There were no vitals filed for this visit.    There is no height or weight on file to calculate BMI.    This a well-developed, well nourished patient in no acute distress.  They are alert and oriented and cooperative to examination.        Right shoulder exam:  Skin right shoulder is clean dry and intact.  There is no erythema or ecchymosis.  There are no signs or symptoms of  "infection.  Patient is neurovascular intact throughout the right upper extremity.  Patient has fairly well-preserved range of motion about the right shoulder but he does have stiff endpoints.  He can actively forward flex to approximately 160°.  Passively we can get him to 180.  He can externally rotate to approximately 60° but this is equal bilaterally.  He can abduct with his right arm to approximately 160° as well.  He does have a positive speed's test.  He has a positive empty can test.  He has a positive Neer impingement arc.  His rotator cuff is strong on resisted testing and is tender for him.    Pertinent New Results:    XRAY Report / Interpretation:   Two views were taken of his right shoulder today:  AP and Y-view.  They reveal no acute fractures or dislocations.  Patient does have a type 2 acromion and arthrosis of his acromioclavicular joint with trumpeting of the distal end of his clavicle.  Visualized soft tissues are unremarkable.    Assessment/Plan:      1.  Right shoulder biceps tendinitis.  2.  Right shoulder rotator cuff impingement syndrome.    I injected right shoulder subacromial space today via a posterolateral approach with 40 mg of Kenalog and lidocaine.  He tolerated this well.  We will see him back in 6 weeks see how he is doing with this injection.    Michele Kincaid, Physician Assistant, served in the capacity as a "scribe" for this patient encounter.  A "face-to-face" encounter occurred with Dr. Martin Hernandez on this date.  The treatment plan and medical decision-making is outlined above. Patient was seen and examined with a chaperone.       This note was created using Dragon voice recognition software that occasionally misinterpreted phrases or words.          "

## 2022-02-22 NOTE — PROCEDURES
Large Joint Aspiration/Injection: R subacromial bursa    Date/Time: 2/22/2022 12:45 PM  Performed by: Martin Hernandez MD  Authorized by: Martin Hernandez MD     Consent Done?:  Yes (Verbal)  Indications:  Pain  Site marked: the procedure site was marked    Timeout: prior to procedure the correct patient, procedure, and site was verified    Prep: patient was prepped and draped in usual sterile fashion      Local anesthesia used?: Yes    Local anesthetic:  Lidocaine 1% without epinephrine  Ultrasonic Guidance for needle placement?: No    Location:  Shoulder  Site:  R subacromial bursa  Medications:  40 mg triamcinolone acetonide 40 mg/mL  Patient tolerance:  Patient tolerated the procedure well with no immediate complications

## 2022-03-30 ENCOUNTER — TELEPHONE (OUTPATIENT)
Dept: FAMILY MEDICINE | Facility: CLINIC | Age: 82
End: 2022-03-30
Payer: MEDICARE

## 2022-03-30 DIAGNOSIS — Z79.899 ENCOUNTER FOR LONG-TERM (CURRENT) USE OF OTHER MEDICATIONS: Primary | ICD-10-CM

## 2022-03-30 DIAGNOSIS — E78.2 MIXED HYPERLIPIDEMIA: ICD-10-CM

## 2022-03-30 DIAGNOSIS — E03.9 ACQUIRED HYPOTHYROIDISM: ICD-10-CM

## 2022-03-30 DIAGNOSIS — I10 ESSENTIAL HYPERTENSION: ICD-10-CM

## 2022-03-30 DIAGNOSIS — E78.5 HYPERLIPIDEMIA, UNSPECIFIED HYPERLIPIDEMIA TYPE: ICD-10-CM

## 2022-03-31 LAB
ALBUMIN SERPL-MCNC: 4.1 G/DL (ref 3.6–5.1)
ALBUMIN/GLOB SERPL: 1.7 (CALC) (ref 1–2.5)
ALP SERPL-CCNC: 37 U/L (ref 35–144)
ALT SERPL-CCNC: 15 U/L (ref 9–46)
AST SERPL-CCNC: 21 U/L (ref 10–35)
BASOPHILS # BLD AUTO: 19 CELLS/UL (ref 0–200)
BASOPHILS NFR BLD AUTO: 0.3 %
BILIRUB SERPL-MCNC: 0.5 MG/DL (ref 0.2–1.2)
BUN SERPL-MCNC: 15 MG/DL (ref 7–25)
BUN/CREAT SERPL: ABNORMAL (CALC) (ref 6–22)
CALCIUM SERPL-MCNC: 9.4 MG/DL (ref 8.6–10.3)
CHLORIDE SERPL-SCNC: 105 MMOL/L (ref 98–110)
CHOLEST SERPL-MCNC: 204 MG/DL
CHOLEST/HDLC SERPL: 4 (CALC)
CO2 SERPL-SCNC: 28 MMOL/L (ref 20–32)
CREAT SERPL-MCNC: 0.93 MG/DL (ref 0.7–1.11)
EOSINOPHIL # BLD AUTO: 50 CELLS/UL (ref 15–500)
EOSINOPHIL NFR BLD AUTO: 0.8 %
ERYTHROCYTE [DISTWIDTH] IN BLOOD BY AUTOMATED COUNT: 12.6 % (ref 11–15)
GLOBULIN SER CALC-MCNC: 2.4 G/DL (CALC) (ref 1.9–3.7)
GLUCOSE SERPL-MCNC: 107 MG/DL (ref 65–99)
HCT VFR BLD AUTO: 39.5 % (ref 38.5–50)
HDLC SERPL-MCNC: 51 MG/DL
HGB BLD-MCNC: 13.3 G/DL (ref 13.2–17.1)
LDLC SERPL CALC-MCNC: 136 MG/DL (CALC)
LYMPHOCYTES # BLD AUTO: 1147 CELLS/UL (ref 850–3900)
LYMPHOCYTES NFR BLD AUTO: 18.2 %
MCH RBC QN AUTO: 30.2 PG (ref 27–33)
MCHC RBC AUTO-ENTMCNC: 33.7 G/DL (ref 32–36)
MCV RBC AUTO: 89.8 FL (ref 80–100)
MONOCYTES # BLD AUTO: 397 CELLS/UL (ref 200–950)
MONOCYTES NFR BLD AUTO: 6.3 %
NEUTROPHILS # BLD AUTO: 4687 CELLS/UL (ref 1500–7800)
NEUTROPHILS NFR BLD AUTO: 74.4 %
NONHDLC SERPL-MCNC: 153 MG/DL (CALC)
PLATELET # BLD AUTO: 188 THOUSAND/UL (ref 140–400)
PMV BLD REES-ECKER: 11 FL (ref 7.5–12.5)
POTASSIUM SERPL-SCNC: 4.2 MMOL/L (ref 3.5–5.3)
PROT SERPL-MCNC: 6.5 G/DL (ref 6.1–8.1)
RBC # BLD AUTO: 4.4 MILLION/UL (ref 4.2–5.8)
SODIUM SERPL-SCNC: 140 MMOL/L (ref 135–146)
TRIGL SERPL-MCNC: 75 MG/DL
TSH SERPL-ACNC: 3.41 MIU/L (ref 0.4–4.5)
WBC # BLD AUTO: 6.3 THOUSAND/UL (ref 3.8–10.8)

## 2022-04-05 ENCOUNTER — OFFICE VISIT (OUTPATIENT)
Dept: FAMILY MEDICINE | Facility: CLINIC | Age: 82
End: 2022-04-05
Payer: MEDICARE

## 2022-04-05 VITALS
HEART RATE: 74 BPM | DIASTOLIC BLOOD PRESSURE: 60 MMHG | SYSTOLIC BLOOD PRESSURE: 118 MMHG | HEIGHT: 68 IN | OXYGEN SATURATION: 98 % | WEIGHT: 164 LBS | BODY MASS INDEX: 24.86 KG/M2

## 2022-04-05 DIAGNOSIS — N40.0 BENIGN PROSTATIC HYPERPLASIA, UNSPECIFIED WHETHER LOWER URINARY TRACT SYMPTOMS PRESENT: ICD-10-CM

## 2022-04-05 DIAGNOSIS — C41.4 SACRAL CHORDOMA: ICD-10-CM

## 2022-04-05 DIAGNOSIS — I25.10 CORONARY ARTERY DISEASE INVOLVING NATIVE CORONARY ARTERY OF NATIVE HEART WITHOUT ANGINA PECTORIS: ICD-10-CM

## 2022-04-05 DIAGNOSIS — E03.9 ACQUIRED HYPOTHYROIDISM: ICD-10-CM

## 2022-04-05 DIAGNOSIS — E78.2 MIXED HYPERLIPIDEMIA: ICD-10-CM

## 2022-04-05 DIAGNOSIS — G62.9 PERIPHERAL POLYNEUROPATHY: Primary | ICD-10-CM

## 2022-04-05 PROCEDURE — 1159F PR MEDICATION LIST DOCUMENTED IN MEDICAL RECORD: ICD-10-PCS | Mod: S$GLB,,, | Performed by: NURSE PRACTITIONER

## 2022-04-05 PROCEDURE — 1159F MED LIST DOCD IN RCRD: CPT | Mod: S$GLB,,, | Performed by: NURSE PRACTITIONER

## 2022-04-05 PROCEDURE — 3074F PR MOST RECENT SYSTOLIC BLOOD PRESSURE < 130 MM HG: ICD-10-PCS | Mod: S$GLB,,, | Performed by: NURSE PRACTITIONER

## 2022-04-05 PROCEDURE — 1101F PT FALLS ASSESS-DOCD LE1/YR: CPT | Mod: S$GLB,,, | Performed by: NURSE PRACTITIONER

## 2022-04-05 PROCEDURE — 3078F PR MOST RECENT DIASTOLIC BLOOD PRESSURE < 80 MM HG: ICD-10-PCS | Mod: S$GLB,,, | Performed by: NURSE PRACTITIONER

## 2022-04-05 PROCEDURE — 3288F FALL RISK ASSESSMENT DOCD: CPT | Mod: S$GLB,,, | Performed by: NURSE PRACTITIONER

## 2022-04-05 PROCEDURE — 3074F SYST BP LT 130 MM HG: CPT | Mod: S$GLB,,, | Performed by: NURSE PRACTITIONER

## 2022-04-05 PROCEDURE — 1101F PR PT FALLS ASSESS DOC 0-1 FALLS W/OUT INJ PAST YR: ICD-10-PCS | Mod: S$GLB,,, | Performed by: NURSE PRACTITIONER

## 2022-04-05 PROCEDURE — 3288F PR FALLS RISK ASSESSMENT DOCUMENTED: ICD-10-PCS | Mod: S$GLB,,, | Performed by: NURSE PRACTITIONER

## 2022-04-05 PROCEDURE — 1160F PR REVIEW ALL MEDS BY PRESCRIBER/CLIN PHARMACIST DOCUMENTED: ICD-10-PCS | Mod: S$GLB,,, | Performed by: NURSE PRACTITIONER

## 2022-04-05 PROCEDURE — 3078F DIAST BP <80 MM HG: CPT | Mod: S$GLB,,, | Performed by: NURSE PRACTITIONER

## 2022-04-05 PROCEDURE — 1160F RVW MEDS BY RX/DR IN RCRD: CPT | Mod: S$GLB,,, | Performed by: NURSE PRACTITIONER

## 2022-04-05 PROCEDURE — 99214 OFFICE O/P EST MOD 30 MIN: CPT | Mod: S$GLB,,, | Performed by: NURSE PRACTITIONER

## 2022-04-05 PROCEDURE — 99214 PR OFFICE/OUTPT VISIT, EST, LEVL IV, 30-39 MIN: ICD-10-PCS | Mod: S$GLB,,, | Performed by: NURSE PRACTITIONER

## 2022-04-05 RX ORDER — GABAPENTIN 300 MG/1
CAPSULE ORAL
Qty: 270 CAPSULE | Refills: 1 | Status: ON HOLD | OUTPATIENT
Start: 2022-04-05 | End: 2023-05-03 | Stop reason: HOSPADM

## 2022-04-05 RX ORDER — FENOFIBRATE 160 MG/1
160 TABLET ORAL DAILY
Qty: 90 TABLET | Refills: 1 | Status: SHIPPED | OUTPATIENT
Start: 2022-04-05 | End: 2024-01-03

## 2022-04-05 RX ORDER — TAMSULOSIN HYDROCHLORIDE 0.4 MG/1
0.4 CAPSULE ORAL NIGHTLY
Qty: 90 CAPSULE | Refills: 1 | Status: ON HOLD | OUTPATIENT
Start: 2022-04-05 | End: 2023-04-08

## 2022-04-05 RX ORDER — METOPROLOL SUCCINATE 25 MG/1
25 TABLET, EXTENDED RELEASE ORAL DAILY
Qty: 90 TABLET | Refills: 1 | Status: ON HOLD | OUTPATIENT
Start: 2022-04-05 | End: 2023-04-08

## 2022-04-05 RX ORDER — LEVOTHYROXINE SODIUM 50 UG/1
50 TABLET ORAL
Qty: 90 TABLET | Refills: 1 | Status: ON HOLD | OUTPATIENT
Start: 2022-04-05 | End: 2023-04-08

## 2022-04-05 NOTE — PROGRESS NOTES
SUBJECTIVE:    Patient ID: Nimisha Longoria is a 82 y.o. male.    Chief Complaint: Follow-up (Brought bottles, vaccines denied//dp)    Pt here for regular f/u- neuropathy/thyroid/lipids/CAD  Reports continues with neuropathy c/o's to bilat lower legs below knee down- numbness to bilat legs which impacts his balance. Took metanx for a couple months though didn't find it helped at all. Pt/wife asking about trying gabapentin again though pt's main concern is balance issue and not pain. Admits has continued to fall occasionally, denies any injuries. Uses cane, has walker but doesn't want to use it. Pt reports has previously been seeing Dr. Avelar, neuro who told him there really wasn't anything more to offer him.  He tells me he doesn't think he needs to keep taking any of his meds and really only wants to have to come to the doctor once a year- thinks he's too old to worry about cholesterol, labs, etc.  Reports has f/u tomorrow with JARET Gregorio for sacral chordoma and states this is the last time he's going to f/u with him- was previously told he wouldn't live to see age 75 so already thinks he's outlived expectations.  Reports still active around the house, he does more housework, etc because his wife has more medical problems.        Telephone on 03/30/2022   Component Date Value Ref Range Status    WBC 03/30/2022 6.3  3.8 - 10.8 Thousand/uL Final    RBC 03/30/2022 4.40  4.20 - 5.80 Million/uL Final    Hemoglobin 03/30/2022 13.3  13.2 - 17.1 g/dL Final    Hematocrit 03/30/2022 39.5  38.5 - 50.0 % Final    MCV 03/30/2022 89.8  80.0 - 100.0 fL Final    MCH 03/30/2022 30.2  27.0 - 33.0 pg Final    MCHC 03/30/2022 33.7  32.0 - 36.0 g/dL Final    RDW 03/30/2022 12.6  11.0 - 15.0 % Final    Platelets 03/30/2022 188  140 - 400 Thousand/uL Final    MPV 03/30/2022 11.0  7.5 - 12.5 fL Final    Neutrophils, Abs 03/30/2022 4,687  1,500 - 7,800 cells/uL Final    Lymph # 03/30/2022 1,147  850 - 3,900 cells/uL Final     Mono # 03/30/2022 397  200 - 950 cells/uL Final    Eos # 03/30/2022 50  15 - 500 cells/uL Final    Baso # 03/30/2022 19  0 - 200 cells/uL Final    Neutrophils Relative 03/30/2022 74.4  % Final    Lymph % 03/30/2022 18.2  % Final    Mono % 03/30/2022 6.3  % Final    Eosinophil % 03/30/2022 0.8  % Final    Basophil % 03/30/2022 0.3  % Final    Glucose 03/30/2022 107 (A) 65 - 99 mg/dL Final    BUN 03/30/2022 15  7 - 25 mg/dL Final    Creatinine 03/30/2022 0.93  0.70 - 1.11 mg/dL Final    eGFR if non African American 03/30/2022 76  > OR = 60 mL/min/1.73m2 Final    eGFR if  03/30/2022 88  > OR = 60 mL/min/1.73m2 Final    BUN/Creatinine Ratio 03/30/2022 NOT APPLICABLE  6 - 22 (calc) Final    Sodium 03/30/2022 140  135 - 146 mmol/L Final    Potassium 03/30/2022 4.2  3.5 - 5.3 mmol/L Final    Chloride 03/30/2022 105  98 - 110 mmol/L Final    CO2 03/30/2022 28  20 - 32 mmol/L Final    Calcium 03/30/2022 9.4  8.6 - 10.3 mg/dL Final    Total Protein 03/30/2022 6.5  6.1 - 8.1 g/dL Final    Albumin 03/30/2022 4.1  3.6 - 5.1 g/dL Final    Globulin, Total 03/30/2022 2.4  1.9 - 3.7 g/dL (calc) Final    Albumin/Globulin Ratio 03/30/2022 1.7  1.0 - 2.5 (calc) Final    Total Bilirubin 03/30/2022 0.5  0.2 - 1.2 mg/dL Final    Alkaline Phosphatase 03/30/2022 37  35 - 144 U/L Final    AST 03/30/2022 21  10 - 35 U/L Final    ALT 03/30/2022 15  9 - 46 U/L Final    Cholesterol 03/30/2022 204 (A) <200 mg/dL Final    HDL 03/30/2022 51  > OR = 40 mg/dL Final    Triglycerides 03/30/2022 75  <150 mg/dL Final    LDL Cholesterol 03/30/2022 136 (A) mg/dL (calc) Final    HDL/Cholesterol Ratio 03/30/2022 4.0  <5.0 (calc) Final    Non HDL Chol. (LDL+VLDL) 03/30/2022 153 (A) <130 mg/dL (calc) Final    TSH 03/30/2022 3.41  0.40 - 4.50 mIU/L Final       Past Medical History:   Diagnosis Date    Abnormal SPEP 1/17/2021    Anemia     Anemia due to multiple mechanisms 1/17/2021    Anemia, chronic  disease 1/17/2021    Coronary artery disease     Hyperlipidemia     Hypertension     Normochromic normocytic anemia 1/17/2021    Sacral chordoma      Past Surgical History:   Procedure Laterality Date    BACK SURGERY      CORONARY ARTERY BYPASS GRAFT      SPINE SURGERY      Dr Villatoro     History reviewed. No pertinent family history.    The CVD Risk score (HARRISONAgostino, et al., 2008) failed to calculate for the following reasons:    The 2008 CVD risk score is only valid for ages 30 to 74     Marital Status:   Alcohol History:  reports no history of alcohol use.  Tobacco History:  reports that he has quit smoking. His smoking use included cigarettes. He has a 50.00 pack-year smoking history. He has never used smokeless tobacco.  Drug History:  reports no history of drug use.    Health Maintenance Topics with due status: Not Due       Topic Last Completion Date    TETANUS VACCINE 04/05/2016    Lipid Panel 03/30/2022    Aspirin/Antiplatelet Therapy 04/05/2022     Immunization History   Administered Date(s) Administered    COVID-19, MRNA, LN-S, PF (Pfizer) (Purple Cap) 02/09/2021, 03/02/2021, 12/01/2021    Tdap 04/05/2016       Review of patient's allergies indicates:  No Known Allergies    Current Outpatient Medications:     aspirin (ECOTRIN) 81 MG EC tablet, Take 162 mg by mouth once daily. , Disp: , Rfl:     atorvastatin (LIPITOR) 40 MG tablet, Take 1 tablet (40 mg total) by mouth once daily., Disp: 90 tablet, Rfl: 1    betamethasone dipropionate 0.05 % cream, Apply topically 2 (two) times daily., Disp: 45 g, Rfl: 2    clotrimazole (LOTRIMIN) 1 % cream, Apply topically 2 (two) times daily., Disp: 45 g, Rfl: 2    fenofibrate 160 MG Tab, Take 1 tablet (160 mg total) by mouth once daily., Disp: 90 tablet, Rfl: 1    gabapentin (NEURONTIN) 300 MG capsule, Start with 1 capsule at bedtime for 1 week then increase to 1 capsule BID. May increase further 1 capsule TID as needed for neuropathy pain,  "Disp: 270 capsule, Rfl: 1    levothyroxine (SYNTHROID) 50 MCG tablet, Take 1 tablet (50 mcg total) by mouth before breakfast. First thing in the morning before breakfast. For thyroid., Disp: 90 tablet, Rfl: 1    metoprolol succinate (TOPROL-XL) 25 MG 24 hr tablet, Take 1 tablet (25 mg total) by mouth once daily., Disp: 90 tablet, Rfl: 1    tamsulosin (FLOMAX) 0.4 mg Cap, Take 1 capsule (0.4 mg total) by mouth every evening., Disp: 90 capsule, Rfl: 1    Review of Systems   Constitutional: Negative for chills and fever.   HENT: Positive for hearing loss. Negative for sore throat and trouble swallowing.    Eyes: Negative for visual disturbance.   Respiratory: Negative for cough, shortness of breath and wheezing.    Cardiovascular: Negative for chest pain, palpitations and leg swelling.   Gastrointestinal: Negative for abdominal pain, constipation and diarrhea.   Genitourinary: Negative for dysuria and hematuria.   Musculoskeletal: Positive for gait problem. Negative for back pain.   Skin: Negative for rash.   Neurological: Positive for numbness (bilat feet up to knees bilat). Negative for dizziness and headaches.   Psychiatric/Behavioral: Negative for dysphoric mood.          Objective:      Vitals:    04/05/22 1432   BP: 118/60   Pulse: 74   SpO2: 98%   Weight: 74.4 kg (164 lb)   Height: 5' 8" (1.727 m)     Physical Exam  Vitals and nursing note reviewed.   Constitutional:       General: He is not in acute distress.     Appearance: Normal appearance. He is well-developed and normal weight.   HENT:      Head: Normocephalic and atraumatic.   Neck:      Vascular: No carotid bruit.   Cardiovascular:      Rate and Rhythm: Normal rate and regular rhythm.      Heart sounds: No murmur heard.    No friction rub. No gallop.   Pulmonary:      Effort: Pulmonary effort is normal. No respiratory distress.      Breath sounds: Normal breath sounds. No wheezing or rales.   Abdominal:      General: There is no distension.      " Palpations: Abdomen is soft.      Tenderness: There is no abdominal tenderness.   Musculoskeletal:      Cervical back: Neck supple.      Right lower leg: No edema.      Left lower leg: No edema.   Lymphadenopathy:      Cervical: No cervical adenopathy.   Skin:     General: Skin is warm and dry.      Findings: No rash.   Neurological:      General: No focal deficit present.      Mental Status: He is alert and oriented to person, place, and time.      Gait: Gait normal.   Psychiatric:         Mood and Affect: Mood normal.           Assessment:       1. Peripheral polyneuropathy    2. Coronary artery disease involving native coronary artery of native heart without angina pectoris    3. Mixed hyperlipidemia    4. Acquired hypothyroidism    5. Benign prostatic hyperplasia, unspecified whether lower urinary tract symptoms present    6. Sacral chordoma           Plan:       Peripheral polyneuropathy  Comments:  pt/wife advised we can start gabapentin however this will help more with pain and not affect balance- discussed titration schedule and cautioned on side effects. rec he use walker at all times, declines PT referral. Pt tells me he only wants to come to doctor appt once a year unless he has problems  Orders:  -     gabapentin (NEURONTIN) 300 MG capsule; Start with 1 capsule at bedtime for 1 week then increase to 1 capsule BID. May increase further 1 capsule TID as needed for neuropathy pain  Dispense: 270 capsule; Refill: 1    Coronary artery disease involving native coronary artery of native heart without angina pectoris  Comments:  fabián, denies angina- hasn't followed up with cards in years and doesn't want to  Orders:  -     metoprolol succinate (TOPROL-XL) 25 MG 24 hr tablet; Take 1 tablet (25 mg total) by mouth once daily.  Dispense: 90 tablet; Refill: 1    Mixed hyperlipidemia  Comments:  Reviewed recent labs with LDL uncontrolled.  Pt has stopped taking statin, does not feel like he needs to continue but  davedgingly agrees to continue meds after encouragement by his wife   Orders:  -     fenofibrate 160 MG Tab; Take 1 tablet (160 mg total) by mouth once daily.  Dispense: 90 tablet; Refill: 1    Acquired hypothyroidism  Comments:  Stable on recent labs  Orders:  -     levothyroxine (SYNTHROID) 50 MCG tablet; Take 1 tablet (50 mcg total) by mouth before breakfast. First thing in the morning before breakfast. For thyroid.  Dispense: 90 tablet; Refill: 1    Benign prostatic hyperplasia, unspecified whether lower urinary tract symptoms present  Comments:  Stable on med  Orders:  -     tamsulosin (FLOMAX) 0.4 mg Cap; Take 1 capsule (0.4 mg total) by mouth every evening.  Dispense: 90 capsule; Refill: 1    Sacral chordoma  Comments:  Has follow-up with NS tomorrow.  Pt states he does not plan to continue follow up any further after tomorrow's visit      Follow up in about 1 year (around 4/5/2023) for neuropathy. pt request 1 year f/u- encouraged to call for any problems          Counseled on age and gender appropriate medical preventative services, including cancer screenings, immunizations, overall nutritional health, need for a consistent exercise regimen and an overall push towards maintaining a vigorous and active lifestyle.      4/5/2022 Brandi Welsh NP

## 2022-04-08 ENCOUNTER — TELEPHONE (OUTPATIENT)
Dept: NEUROSURGERY | Facility: CLINIC | Age: 82
End: 2022-04-08
Payer: MEDICARE

## 2022-04-08 NOTE — TELEPHONE ENCOUNTER
spoke with pt. Wife and reminded of appointment. Offered a virtual visit option since imaging will be done prior to appt. She declined saying they would prefer in person.

## 2022-04-11 ENCOUNTER — HOSPITAL ENCOUNTER (OUTPATIENT)
Dept: RADIOLOGY | Facility: HOSPITAL | Age: 82
Discharge: HOME | End: 2022-04-11
Attending: NEUROLOGICAL SURGERY
Payer: MEDICARE

## 2022-04-11 DIAGNOSIS — C41.2 CHORDOMA: ICD-10-CM

## 2022-04-11 PROCEDURE — 25500020 PHARM REV CODE 255: Mod: PO | Performed by: NEUROLOGICAL SURGERY

## 2022-04-11 PROCEDURE — 72197 MRI PELVIS W/O & W/DYE: CPT | Mod: TC,PO

## 2022-04-11 PROCEDURE — 72158 MRI LUMBAR SPINE W/O & W/DYE: CPT | Mod: TC,PO

## 2022-04-11 PROCEDURE — A9585 GADOBUTROL INJECTION: HCPCS | Mod: PO | Performed by: NEUROLOGICAL SURGERY

## 2022-04-11 RX ORDER — GADOBUTROL 604.72 MG/ML
7.5 INJECTION INTRAVENOUS
Status: COMPLETED | OUTPATIENT
Start: 2022-04-11 | End: 2022-04-11

## 2022-04-11 RX ADMIN — GADOBUTROL 7.5 ML: 604.72 INJECTION INTRAVENOUS at 11:04

## 2022-04-12 ENCOUNTER — OFFICE VISIT (OUTPATIENT)
Dept: NEUROSURGERY | Facility: CLINIC | Age: 82
End: 2022-04-12
Payer: MEDICARE

## 2022-04-12 DIAGNOSIS — C41.2 CHORDOMA: Primary | ICD-10-CM

## 2022-04-12 PROCEDURE — 1101F PT FALLS ASSESS-DOCD LE1/YR: CPT | Mod: CPTII,S$GLB,, | Performed by: PHYSICIAN ASSISTANT

## 2022-04-12 PROCEDURE — 99999 PR PBB SHADOW E&M-EST. PATIENT-LVL II: CPT | Mod: PBBFAC,,, | Performed by: PHYSICIAN ASSISTANT

## 2022-04-12 PROCEDURE — 1159F PR MEDICATION LIST DOCUMENTED IN MEDICAL RECORD: ICD-10-PCS | Mod: CPTII,S$GLB,, | Performed by: PHYSICIAN ASSISTANT

## 2022-04-12 PROCEDURE — 1126F AMNT PAIN NOTED NONE PRSNT: CPT | Mod: CPTII,S$GLB,, | Performed by: PHYSICIAN ASSISTANT

## 2022-04-12 PROCEDURE — 1101F PR PT FALLS ASSESS DOC 0-1 FALLS W/OUT INJ PAST YR: ICD-10-PCS | Mod: CPTII,S$GLB,, | Performed by: PHYSICIAN ASSISTANT

## 2022-04-12 PROCEDURE — 3288F PR FALLS RISK ASSESSMENT DOCUMENTED: ICD-10-PCS | Mod: CPTII,S$GLB,, | Performed by: PHYSICIAN ASSISTANT

## 2022-04-12 PROCEDURE — 1126F PR PAIN SEVERITY QUANTIFIED, NO PAIN PRESENT: ICD-10-PCS | Mod: CPTII,S$GLB,, | Performed by: PHYSICIAN ASSISTANT

## 2022-04-12 PROCEDURE — 3288F FALL RISK ASSESSMENT DOCD: CPT | Mod: CPTII,S$GLB,, | Performed by: PHYSICIAN ASSISTANT

## 2022-04-12 PROCEDURE — 99999 PR PBB SHADOW E&M-EST. PATIENT-LVL II: ICD-10-PCS | Mod: PBBFAC,,, | Performed by: PHYSICIAN ASSISTANT

## 2022-04-12 PROCEDURE — 1159F MED LIST DOCD IN RCRD: CPT | Mod: CPTII,S$GLB,, | Performed by: PHYSICIAN ASSISTANT

## 2022-04-12 PROCEDURE — 99214 OFFICE O/P EST MOD 30 MIN: CPT | Mod: S$GLB,,, | Performed by: PHYSICIAN ASSISTANT

## 2022-04-12 PROCEDURE — 99214 PR OFFICE/OUTPT VISIT, EST, LEVL IV, 30-39 MIN: ICD-10-PCS | Mod: S$GLB,,, | Performed by: PHYSICIAN ASSISTANT

## 2022-04-13 ENCOUNTER — TELEPHONE (OUTPATIENT)
Dept: NEUROSURGERY | Facility: CLINIC | Age: 82
End: 2022-04-13
Payer: MEDICARE

## 2022-04-13 NOTE — TELEPHONE ENCOUNTER
----- Message from Khurram Todd sent at 4/13/2022  2:02 PM CDT -----  Regarding: call bk from the nurse    The Pt's wife would like a call back to discuss trying to get a neurosurgery provider in the [facility] that would be closer to where they live.     # [phone]

## 2022-04-13 NOTE — TELEPHONE ENCOUNTER
Notified wife that when DR Gonsales closes his note I will see if its ok to send him to neurosurgery in [place]

## 2022-04-14 ENCOUNTER — TELEPHONE (OUTPATIENT)
Dept: NEUROSURGERY | Facility: CLINIC | Age: 82
End: 2022-04-14
Payer: MEDICARE

## 2022-04-14 NOTE — TELEPHONE ENCOUNTER
Spoke with pt and informed that I sent a message to Dr. Vasquez Bowser staff to get him scheduled over in [place].

## 2022-04-14 NOTE — TELEPHONE ENCOUNTER
----- Message from Scar Cain MA sent at 4/14/2022 12:13 PM CDT -----  Sent message to karon staff  ----- Message -----  From: Ev Hooks PA-C  Sent: 4/14/2022  11:43 AM CDT  To: Dru LOPEZ Staff    This pt needs a follow up in 6 months with MRI pelvis. He would like to reestablish with a Neurosurgeon in [place] because it is too hard for them to get to the [facility]. Can we get him an appointment with Dr. Vasquez Bowser?    Thanks!  Ev

## 2022-04-14 NOTE — PROGRESS NOTES
Neurosurgery  Established Patient    SUBJECTIVE:     History of Present Illness:  81 yo male with history of sacral chordoma who presents for annual follow up. He was last seen in clinic by Dr. Gonsales 01/2020. He has previously undergone debulking and radiation for the sacral mass. He underwent a 20 dose course of radiation therapy at a cancer center in [place]. He presents today with his wife who assists with the history. They report his biggest problem has been his peripheral neuropathy. He has been evaluated by a Neurologist who obtained EMG and placed him on Gabapentin for this. He describes the paresthesias in a stocking-like distribution below the knee. He denies new weakness although his wife reports his right foot  Sometimes trips him due to his sensation changes. He continues to deal with constipation which has become worse over the past year. He denies any incontinence or saddle anesthesia. He denies any radicular leg pain.     Review of patient's allergies indicates:  No Known Allergies    Current Outpatient Medications   Medication Sig Dispense Refill    aspirin (ECOTRIN) 81 MG EC tablet Take 162 mg by mouth once daily.       betamethasone dipropionate 0.05 % cream Apply topically 2 (two) times daily. 45 g 2    fenofibrate 160 MG Tab Take 1 tablet (160 mg total) by mouth once daily. 90 tablet 1    gabapentin (NEURONTIN) 300 MG capsule Start with 1 capsule at bedtime for 1 week then increase to 1 capsule BID. May increase further 1 capsule TID as needed for neuropathy pain 270 capsule 1    levothyroxine (SYNTHROID) 50 MCG tablet Take 1 tablet (50 mcg total) by mouth before breakfast. First thing in the morning before breakfast. For thyroid. 90 tablet 1    metoprolol succinate (TOPROL-XL) 25 MG 24 hr tablet Take 1 tablet (25 mg total) by mouth once daily. 90 tablet 1    tamsulosin (FLOMAX) 0.4 mg Cap Take 1 capsule (0.4 mg total) by mouth every evening. 90 capsule 1    atorvastatin (LIPITOR) 40 MG  tablet Take 1 tablet (40 mg total) by mouth once daily. 90 tablet 1    clotrimazole (LOTRIMIN) 1 % cream Apply topically 2 (two) times daily. (Patient not taking: Reported on 4/12/2022) 45 g 2     No current facility-administered medications for this visit.       Past Medical History:   Diagnosis Date    Abnormal SPEP 1/17/2021    Anemia     Anemia due to multiple mechanisms 1/17/2021    Anemia, chronic disease 1/17/2021    Coronary artery disease     Hyperlipidemia     Hypertension     Normochromic normocytic anemia 1/17/2021    Sacral chordoma      Past Surgical History:   Procedure Laterality Date    BACK SURGERY      CORONARY ARTERY BYPASS GRAFT      SPINE SURGERY      Dr Villatoro     Family History    None       Social History     Socioeconomic History    Marital status:    Tobacco Use    Smoking status: Former Smoker     Packs/day: 2.00     Years: 25.00     Pack years: 50.00     Types: Cigarettes    Smokeless tobacco: Never Used    Tobacco comment: quit 1985   Substance and Sexual Activity    Alcohol use: No    Drug use: No       Review of Systems:  Review of Systems  Constitutional: Negative.    HENT: Negative.    Eyes: Negative.    Respiratory: Negative.    Cardiovascular: Negative.    Gastrointestinal: Consitpation   Endocrine: Negative.    Genitourinary: Negative.    Musculoskeletal: Negative.    Skin: Negative.    Allergic/Immunologic: Negative.    Neurological: Positive for numbness.   Hematological: Negative.    Psychiatric/Behavioral: Negative.    OBJECTIVE:     Vital Signs  Pain Score: 0-No pain  There is no height or weight on file to calculate BMI.    Physical Exam:  Neurosurgery Physical Exam  General: well developed, well nourished, no distress.   Head: normocephalic, atraumatic  Neurologic: Alert and oriented. Thought content appropriate.  GCS: Motor: 6/Verbal: 5/Eyes: 4 GCS Total: 15  Mental Status: Awake, Alert, Oriented x3  Cranial nerves: face symmetric, tongue  midline, CN II-XII grossly intact.   Eyes: pupils equal, round, reactive to light with accomodation, EOMI. Unable to appreciate optic disc.   Sensory: decreased to light touch below the knee. Pinprick sensation intact to each dermatome  Motor Strength: Moves all extremities spontaneously with good tone.  Full strength upper and lower extremities. No abnormal movements seen.   DTR's - 2 + and symmetric in UE and LE  Santamaria: absent  Clonus: absent  Babinski: absent  Pulses: 2+ and symmetric radial and dorsalis pedis. No lower extremity edema  Straight leg raise: negative  Gait: stooped     Diagnostic Results:  MRI Lumbar spine and Pelvis dated 4/11/22 reviewed. There is slight increase in size of the sacral chordoma. Stable multilevel spondylosis and facet hypertrophy worst at L4-5, L5-S1.    ASSESSMENT/PLAN:     Overall patient history of sacral chordoma status post radiation treatment and debulking. He presents today for annual follow up for mass surveillance. MRI pelvis reviewed today which shows slight progression of the sacral chordoma and pt reports complaints of worsened constipation however otherwise remains neuro stable. I discussed with the patient and his family that his peripheral neuropathy is unrelated to this. I reviewed the imaging and discussed his case with Dr. Gonsales who recommends referral to Radiation/Oncology for further consideration of XRT. We will plan to see him back in 6 months with a repeat MRI pelvis W WO contrast or sooner with any new neuro complaints.       Ev Hooks PA-C  Neurosurgery          Note dictated with voice recognition software, please excuse any grammatical errors.

## 2022-04-18 ENCOUNTER — TELEPHONE (OUTPATIENT)
Dept: NEUROSURGERY | Facility: CLINIC | Age: 82
End: 2022-04-18
Payer: MEDICARE

## 2022-04-18 NOTE — TELEPHONE ENCOUNTER
----- Message from Betzy Alegria RN sent at 4/14/2022  3:03 PM CDT -----  Regarding: RE: patient would like to see a neurosurgeon on the [facility]  Dr. Bowser reviewed case and for optimal continuum of care, suggests he continue to follow Dr. Gonsales. Please let me know if I can assist further.  Thanks,  Betzy  ----- Message -----  From: Scar Cain MA  Sent: 4/14/2022  12:03 PM CDT  To: Niels Ovalle Staff  Subject: patient would like to see a neurosurgeon on #    Good afternoon, can you please get this patient scheduled with Dr. Bowser. Ev Hooks PA-C ordered an MRI and referral to radiation oncology. If you have any questions or concerns, please get back to me.

## 2022-04-18 NOTE — TELEPHONE ENCOUNTER
Spoke with pt's wife. Informed her that Dr. Bowser referred pt back to Dr. Gonsales and said I would look for other Neurosurgeons on the [facility] for them. She suggested Dr. Carbajal in [place], so I sent him a message via Epic, waiting on response now.

## 2022-04-19 ENCOUNTER — TELEPHONE (OUTPATIENT)
Dept: NEUROSURGERY | Facility: CLINIC | Age: 82
End: 2022-04-19
Payer: MEDICARE

## 2022-04-19 NOTE — TELEPHONE ENCOUNTER
Left  for pt informing that we have sent multiple messages to Neurosurgeons with no luck. I advised them to call insurance to see which surgeons they can see and call that physician to make sure they will see Mr. Longoria. After that, to call us so we can send a referral. I also stated we are more than happy to continue to see the patient.

## 2022-05-05 ENCOUNTER — OFFICE VISIT (OUTPATIENT)
Dept: RADIATION ONCOLOGY | Facility: CLINIC | Age: 82
End: 2022-05-05
Payer: MEDICARE

## 2022-05-05 ENCOUNTER — SOCIAL WORK (OUTPATIENT)
Dept: HEMATOLOGY/ONCOLOGY | Facility: CLINIC | Age: 82
End: 2022-05-05

## 2022-05-05 VITALS
HEART RATE: 58 BPM | SYSTOLIC BLOOD PRESSURE: 154 MMHG | WEIGHT: 174.19 LBS | BODY MASS INDEX: 26.49 KG/M2 | RESPIRATION RATE: 20 BRPM | DIASTOLIC BLOOD PRESSURE: 72 MMHG | OXYGEN SATURATION: 97 %

## 2022-05-05 DIAGNOSIS — C41.2 CHORDOMA: Primary | ICD-10-CM

## 2022-05-05 PROCEDURE — 3077F PR MOST RECENT SYSTOLIC BLOOD PRESSURE >= 140 MM HG: ICD-10-PCS | Mod: CPTII,S$GLB,, | Performed by: RADIOLOGY

## 2022-05-05 PROCEDURE — 3288F FALL RISK ASSESSMENT DOCD: CPT | Mod: CPTII,S$GLB,, | Performed by: RADIOLOGY

## 2022-05-05 PROCEDURE — 3077F SYST BP >= 140 MM HG: CPT | Mod: CPTII,S$GLB,, | Performed by: RADIOLOGY

## 2022-05-05 PROCEDURE — 1159F PR MEDICATION LIST DOCUMENTED IN MEDICAL RECORD: ICD-10-PCS | Mod: CPTII,S$GLB,, | Performed by: RADIOLOGY

## 2022-05-05 PROCEDURE — 99205 PR OFFICE/OUTPT VISIT, NEW, LEVL V, 60-74 MIN: ICD-10-PCS | Mod: S$GLB,,, | Performed by: RADIOLOGY

## 2022-05-05 PROCEDURE — 3288F PR FALLS RISK ASSESSMENT DOCUMENTED: ICD-10-PCS | Mod: CPTII,S$GLB,, | Performed by: RADIOLOGY

## 2022-05-05 PROCEDURE — 1101F PT FALLS ASSESS-DOCD LE1/YR: CPT | Mod: CPTII,S$GLB,, | Performed by: RADIOLOGY

## 2022-05-05 PROCEDURE — 1126F AMNT PAIN NOTED NONE PRSNT: CPT | Mod: CPTII,S$GLB,, | Performed by: RADIOLOGY

## 2022-05-05 PROCEDURE — 1159F MED LIST DOCD IN RCRD: CPT | Mod: CPTII,S$GLB,, | Performed by: RADIOLOGY

## 2022-05-05 PROCEDURE — 1126F PR PAIN SEVERITY QUANTIFIED, NO PAIN PRESENT: ICD-10-PCS | Mod: CPTII,S$GLB,, | Performed by: RADIOLOGY

## 2022-05-05 PROCEDURE — 3078F PR MOST RECENT DIASTOLIC BLOOD PRESSURE < 80 MM HG: ICD-10-PCS | Mod: CPTII,S$GLB,, | Performed by: RADIOLOGY

## 2022-05-05 PROCEDURE — 99205 OFFICE O/P NEW HI 60 MIN: CPT | Mod: S$GLB,,, | Performed by: RADIOLOGY

## 2022-05-05 PROCEDURE — 1101F PR PT FALLS ASSESS DOC 0-1 FALLS W/OUT INJ PAST YR: ICD-10-PCS | Mod: CPTII,S$GLB,, | Performed by: RADIOLOGY

## 2022-05-05 PROCEDURE — 3078F DIAST BP <80 MM HG: CPT | Mod: CPTII,S$GLB,, | Performed by: RADIOLOGY

## 2022-05-05 NOTE — PROGRESS NOTES
"Nimisha Longoria  [id number]  1940 5/5/2022  [facility]  [address] [place] [address]    Dx: Sacral chordoma    HISTORY OF PRESENT ILLNESS:   Mr. Longoria is a 82M who was treated w/ RT here at [facility] 2015 via 5000 cGy in 20 fxns (275cGy/fxn) to a large sacral chordoma which was then followed by [facility] [place] until more recently as patient developed progressive but tranisent peripheral neuropathy via bilateral symmetric feet/sole numbness causing falls, and most recent MRI shows "slight" anterior growth of his sacral mass.  He endorses chronic constipation but no other GI/ issues/incontinence/retention nor any back/tailbone/hip pain.  He is not diabetic and denies any pallor or temp chages in BLE, and has seen neurology w/o noting any dx or offered tx besides B12 and gabapentin.    REVIEW OF SYSTEMS:  A complete ROS was performed and the patient denies any acute changes/concerns other than per HPI.    Past Medical History:   Diagnosis Date    Abnormal SPEP 1/17/2021    Anemia     Anemia due to multiple mechanisms 1/17/2021    Anemia, chronic disease 1/17/2021    Coronary artery disease     Hyperlipidemia     Hypertension     Normochromic normocytic anemia 1/17/2021    Sacral chordoma      Past Surgical History:   Procedure Laterality Date    BACK SURGERY      CORONARY ARTERY BYPASS GRAFT      SPINE SURGERY      Dr Villatoro     Social History     Socioeconomic History    Marital status:    Tobacco Use    Smoking status: Former Smoker     Packs/day: 2.00     Years: 25.00     Pack years: 50.00     Types: Cigarettes    Smokeless tobacco: Never Used    Tobacco comment: quit 1985   Substance and Sexual Activity    Alcohol use: No    Drug use: No     No family history on file.    PRIOR HISTORY OF CHEMOTHERAPY OR RADIOTHERAPY: Please see HPI for patients prior oncologic history.    Medication List with Changes/Refills   Current Medications    ASPIRIN (ECOTRIN) 81 MG " EC TABLET    Take 162 mg by mouth once daily.     ATORVASTATIN (LIPITOR) 40 MG TABLET    Take 1 tablet (40 mg total) by mouth once daily.    BETAMETHASONE DIPROPIONATE 0.05 % CREAM    Apply topically 2 (two) times daily.    CLOTRIMAZOLE (LOTRIMIN) 1 % CREAM    Apply topically 2 (two) times daily.    FENOFIBRATE 160 MG TAB    Take 1 tablet (160 mg total) by mouth once daily.    GABAPENTIN (NEURONTIN) 300 MG CAPSULE    Start with 1 capsule at bedtime for 1 week then increase to 1 capsule BID. May increase further 1 capsule TID as needed for neuropathy pain    LEVOTHYROXINE (SYNTHROID) 50 MCG TABLET    Take 1 tablet (50 mcg total) by mouth before breakfast. First thing in the morning before breakfast. For thyroid.    METOPROLOL SUCCINATE (TOPROL-XL) 25 MG 24 HR TABLET    Take 1 tablet (25 mg total) by mouth once daily.    TAMSULOSIN (FLOMAX) 0.4 MG CAP    Take 1 capsule (0.4 mg total) by mouth every evening.     Review of patient's allergies indicates:  No Known Allergies    QUALITY OF LIFE: 70%- Cares for Self: Unable to Carry on Normal Activity or Active Work    There were no vitals filed for this visit.  There is no height or weight on file to calculate BMI.    PHYSICAL EXAM:  GENERAL: alert; in no apparent distress.   HEAD: normocephalic, atraumatic.  EYES: pupils are equal, round, reactive to light and accommodation. Sclera anicteric. Conjunctiva not injected.   NOSE/THROAT: no nasal erythema or rhinorrhea. Oropharynx pink, without erythema, ulcerations or thrush.   NECK: no cervical motion rigidity; supple with no masses.  CHEST: clear to auscultation bilaterally; no wheezes, crackles or rubs. Patient is speaking comfortably on room air with normal work of breathing without using accessory muscles of respiration.  CARDIOVASCULAR: regular rate and rhythm; no murmurs, rubs or gallops.  ABDOMEN: soft, nontender, nondistended. Bowel sounds present.   MUSCULOSKELETAL: no tenderness to palpation along the spine or  scapulae. Normal range of motion.  NEUROLOGIC: cranial nerves II-XII intact bilaterally. Strength 5/5 in bilateral upper and lower extremities. Sensation in dorsal feet vague and difficult for patient to pinpoint as he claims transient nature. Reflexes globally intact. No cerebellar signs. Uses cane for ambulation due to BLE parasthesias.  LYMPHATIC: no cervical, supraclavicular or axillary adenopathy appreciated bilaterally.   EXTREMITIES: no clubbing, cyanosis, edema.  SKIN: no erythema, rashes, ulcerations noted.     REVIEW OF IMAGING/PATHOLOGY/LABS: Please see HPI. All images reviewed personally by dictating physician.       ASSESSMENT: Nimisha Longoria is a 82 y.o. male with large slowly progressing sacral chordoma s/p 5000cGy RT in 2015.    PLAN:  After complete review of the patient's chart/hx and eval/discussion w/ the patient today, I explained that no meaningful and safe amount of additional RT can be given here locally; but this can potentially be done w/ protons, nearest options at [facility], [place], or [facility].      However, his BLE neuropathy may very well be from RT myelopathy/fibrosis an thus would be worsened by additional RT, along w/ potential for soft tissue/osseous necrosis.  Dermatomes of dorsal feet, however, are mostly L3-S2 which are slightly higher than his S2-5 lesion.    Without acute pain or aggressive progression noted, I do not see an impetus for prompt therapy, but I did recommend at least a virtual consult w/ [facility] or a proton facility re: the potential RT options, and /or surgical/medical therapies possibly available.    The patient verbalized understanding of the above plan, risks, benefits, and details. Contact info was exchanged, and the patient was encouraged to contact our clinic with any questions, needs, or concerns.    DISPOSITION: RTC PRN    I have personally seen and evaluated this patient. Greater than 50% of this time was spent discussing coordination of care and/or  counseling.    PHYSICIAN: Jason Anne III, MD    Thank you for the opportunity to meet and consult with Nimisha Longoria.   Please feel free to contact me to discuss the above recommendation further.

## 2022-05-05 NOTE — PROGRESS NOTES
Mr. Nimisha Longoria is an 82 year old diagnosed with chordoma.  He is here today, accompanied by his wife, for a radiation consult with Dr. Anne.  I met with him to complete new patient orientation and the NCCN Distress Screening; he indicated a rating of 1.  Patient denied needing the number to access the [facility] financial assistance application.  He denied needing additional psychosocial supports at this time.  I provided him with my contact information in the event he needs supportive services in the future.

## 2022-07-25 ENCOUNTER — TELEPHONE (OUTPATIENT)
Dept: FAMILY MEDICINE | Facility: CLINIC | Age: 82
End: 2022-07-25

## 2022-07-25 DIAGNOSIS — R26.89 SHUFFLING GAIT: Primary | ICD-10-CM

## 2022-07-25 DIAGNOSIS — G62.9 PERIPHERAL POLYNEUROPATHY: ICD-10-CM

## 2022-07-25 NOTE — TELEPHONE ENCOUNTER
----- Message from Adry Flores sent at 7/25/2022  2:25 PM CDT -----  Patient wife called and stated that the patient need a walker and she would like to see if Brandi can get one through medicare please give her a call at [phone] if any questions

## 2022-08-24 DIAGNOSIS — C41.4 SACRAL CHORDOMA: Primary | ICD-10-CM

## 2022-09-02 ENCOUNTER — TELEPHONE (OUTPATIENT)
Dept: FAMILY MEDICINE | Facility: CLINIC | Age: 82
End: 2022-09-02

## 2022-09-02 DIAGNOSIS — R26.89 SHUFFLING GAIT: Primary | ICD-10-CM

## 2022-09-02 NOTE — TELEPHONE ENCOUNTER
----- Message from Sierra Snell MA sent at 9/2/2022 11:25 AM CDT -----  Pt wife calling because the wrong walker was ordered. The pt needs one that has a seat on it. # [phone]

## 2022-09-02 NOTE — TELEPHONE ENCOUNTER
Spoke to wife. A new walker has been ordered. Informed wife that pt would not get new equipment before the weekend. She voiced understanding.

## 2022-09-27 ENCOUNTER — HOSPITAL ENCOUNTER (OUTPATIENT)
Dept: RADIOLOGY | Facility: HOSPITAL | Age: 82
Discharge: HOME | End: 2022-09-27
Attending: RADIOLOGY
Payer: MEDICARE

## 2022-09-27 DIAGNOSIS — C41.4 SACRAL CHORDOMA: ICD-10-CM

## 2022-09-27 LAB
CREAT SERPL-MCNC: 0.8 MG/DL (ref 0.5–1.4)
SAMPLE: NORMAL

## 2022-09-27 PROCEDURE — 72197 MRI PELVIS W/O & W/DYE: CPT | Mod: TC,PO

## 2022-09-27 PROCEDURE — 25500020 PHARM REV CODE 255: Mod: PO

## 2022-09-27 PROCEDURE — 72157 MRI CHEST SPINE W/O & W/DYE: CPT | Mod: TC,PO

## 2022-09-27 PROCEDURE — 72158 MRI LUMBAR SPINE W/O & W/DYE: CPT | Mod: TC,PO

## 2022-09-27 PROCEDURE — A9585 GADOBUTROL INJECTION: HCPCS | Mod: PO

## 2022-09-27 RX ORDER — GADOBUTROL 604.72 MG/ML
7.5 INJECTION INTRAVENOUS
Status: COMPLETED | OUTPATIENT
Start: 2022-09-27 | End: 2022-09-27

## 2022-09-27 RX ADMIN — GADOBUTROL 7.5 ML: 604.72 INJECTION INTRAVENOUS at 02:09

## 2022-10-04 ENCOUNTER — TELEPHONE (OUTPATIENT)
Dept: FAMILY MEDICINE | Facility: CLINIC | Age: 82
End: 2022-10-04

## 2022-10-04 DIAGNOSIS — I71.40 ABDOMINAL AORTIC ANEURYSM (AAA) WITHOUT RUPTURE, UNSPECIFIED PART: Primary | ICD-10-CM

## 2022-10-04 NOTE — TELEPHONE ENCOUNTER
Please let patient know I have entered a referral to Dr. Conrad, vascular surgeon for abdominal aortic aneurysm.  They may need to call to schedule the appointment

## 2022-10-04 NOTE — TELEPHONE ENCOUNTER
----- Message from Jacqui Carrion MA sent at 10/4/2022 11:28 AM CDT -----  Regarding: referal to surgeon  Patient needs referal to surgeon   [phone]

## 2022-10-04 NOTE — TELEPHONE ENCOUNTER
Spoke to pt Wife, states he recently had an MRI of his back done. They found an aorta aneurism. Pt was told to contact his PCP to see about a tx plan.

## 2022-10-05 ENCOUNTER — TELEPHONE (OUTPATIENT)
Dept: FAMILY MEDICINE | Facility: CLINIC | Age: 82
End: 2022-10-05

## 2022-10-05 NOTE — TELEPHONE ENCOUNTER
----- Message from Adry Flores sent at 10/5/2022 10:08 AM CDT -----  Gabrielle with Dr Conrad office called and stated that she need office notes and test results please fax to [phone] if any questions please call her at [phone]

## 2022-10-06 NOTE — TELEPHONE ENCOUNTER
Spoke with Karie, pts spouse  in regards to recent message. Verbalized per Brandi that she has entered a referral to Dr. Conrad, vascular surgeon for abdominal aortic aneurysm.  They may need to call to schedule the appointment. Karie acknowledge understanding.

## 2022-10-07 ENCOUNTER — TELEPHONE (OUTPATIENT)
Dept: FAMILY MEDICINE | Facility: CLINIC | Age: 82
End: 2022-10-07

## 2022-10-07 NOTE — TELEPHONE ENCOUNTER
----- Message from Mercedez Preston sent at 10/7/2022 11:45 AM CDT -----  Vm- daughter manuelito de jesus is calling back to clarify a call   [phone]

## 2022-10-07 NOTE — TELEPHONE ENCOUNTER
----- Message from Adry Flores sent at 10/7/2022  7:18 AM CDT -----  VM: 10/06/2022 @ 5:34 PM :Mesha Morgan called and stated that she missed a call from the nurse please give her a call at [phone]

## 2022-10-07 NOTE — TELEPHONE ENCOUNTER
Spoke with Karie stewart's spouse yesterday in regards to referral to Dr. Conrad office. Please see telephone encounter for 10/06/2022

## 2022-10-10 NOTE — TELEPHONE ENCOUNTER
Spoke with Mesha stewart's daughter in regards to message sent. Verbalized that we had already sent in the referral to Dr. Conrad office. Mesha acknowledge understanding.

## 2022-10-19 DIAGNOSIS — I71.43 INFRARENAL ABDOMINAL AORTIC ANEURYSM (AAA) WITHOUT RUPTURE: Primary | ICD-10-CM

## 2022-10-26 ENCOUNTER — HOSPITAL ENCOUNTER (OUTPATIENT)
Dept: RADIOLOGY | Facility: HOSPITAL | Age: 82
Discharge: HOME | End: 2022-10-26
Attending: SURGERY
Payer: MEDICARE

## 2022-10-26 DIAGNOSIS — I71.43 INFRARENAL ABDOMINAL AORTIC ANEURYSM (AAA) WITHOUT RUPTURE: ICD-10-CM

## 2022-10-26 PROCEDURE — 74174 CTA ABD&PLVS W/CONTRAST: CPT | Mod: TC,PO

## 2022-10-26 PROCEDURE — 25500020 PHARM REV CODE 255: Mod: PO | Performed by: SURGERY

## 2022-10-26 RX ADMIN — IOHEXOL 100 ML: 350 INJECTION, SOLUTION INTRAVENOUS at 02:10

## 2022-11-14 PROBLEM — I71.43 INFRARENAL ABDOMINAL AORTIC ANEURYSM (AAA) WITHOUT RUPTURE: Status: ACTIVE | Noted: 2022-11-14

## 2022-12-29 ENCOUNTER — TELEPHONE (OUTPATIENT)
Dept: FAMILY MEDICINE | Facility: CLINIC | Age: 82
End: 2022-12-29

## 2022-12-29 DIAGNOSIS — C41.4 SACRAL CHORDOMA: Primary | ICD-10-CM

## 2022-12-29 NOTE — TELEPHONE ENCOUNTER
----- Message from Jacqui Orozco sent at 12/29/2022  2:10 PM CST -----  Patient need a letter to be treated out of network for his insurance. [phone]

## 2022-12-29 NOTE — TELEPHONE ENCOUNTER
Please call pt/wife and clarify what type of doctor this is, neurosurgeon? I can't find Dr. Domenico Bhardwaj in the system, I'm assuming this is for the sacral chordoma-

## 2022-12-29 NOTE — TELEPHONE ENCOUNTER
Spoke with pt wife she stated he needs this letter because he has a tumor that is growing back again and they were referred to a hospital in [place] for treatment. Dr. Domenico Rowe will be treating him for this.

## 2022-12-29 NOTE — TELEPHONE ENCOUNTER
I cannot find Dr. Domenico Bhardwaj in Epic so generic hem-onc referral placed however we need a fax number to send referral

## 2023-04-07 ENCOUNTER — HOSPITAL ENCOUNTER (INPATIENT)
Facility: HOSPITAL | Age: 83
LOS: 5 days | Discharge: NURSING, REHAB OR LONG-TERM CARE | DRG: 481 | End: 2023-04-12
Attending: EMERGENCY MEDICINE | Admitting: STUDENT IN AN ORGANIZED HEALTH CARE EDUCATION/TRAINING PROGRAM
Payer: MEDICARE

## 2023-04-07 DIAGNOSIS — S79.911A HIP INJURY, RIGHT, INITIAL ENCOUNTER: ICD-10-CM

## 2023-04-07 DIAGNOSIS — R07.9 CHEST PAIN: ICD-10-CM

## 2023-04-07 DIAGNOSIS — Z01.818 PRE-OP EVALUATION: ICD-10-CM

## 2023-04-07 DIAGNOSIS — S59.901A ELBOW INJURY, RIGHT, INITIAL ENCOUNTER: ICD-10-CM

## 2023-04-07 DIAGNOSIS — S72.001A CLOSED FRACTURE OF RIGHT HIP, INITIAL ENCOUNTER: Primary | ICD-10-CM

## 2023-04-07 LAB
ABO + RH BLD: NORMAL
ALBUMIN SERPL BCP-MCNC: 3.5 G/DL (ref 3.5–5.2)
ALP SERPL-CCNC: 67 U/L (ref 55–135)
ALT SERPL W/O P-5'-P-CCNC: 8 U/L (ref 10–44)
ANION GAP SERPL CALC-SCNC: 11 MMOL/L (ref 8–16)
AST SERPL-CCNC: 13 U/L (ref 10–40)
BASOPHILS # BLD AUTO: 0.02 K/UL (ref 0–0.2)
BASOPHILS NFR BLD: 0.2 % (ref 0–1.9)
BILIRUB SERPL-MCNC: 0.4 MG/DL (ref 0.1–1)
BLD GP AB SCN CELLS X3 SERPL QL: NORMAL
BUN SERPL-MCNC: 16 MG/DL (ref 8–23)
CALCIUM SERPL-MCNC: 9.3 MG/DL (ref 8.7–10.5)
CHLORIDE SERPL-SCNC: 107 MMOL/L (ref 95–110)
CO2 SERPL-SCNC: 25 MMOL/L (ref 23–29)
CREAT SERPL-MCNC: 0.8 MG/DL (ref 0.5–1.4)
DIFFERENTIAL METHOD: ABNORMAL
EOSINOPHIL # BLD AUTO: 0 K/UL (ref 0–0.5)
EOSINOPHIL NFR BLD: 0.2 % (ref 0–8)
ERYTHROCYTE [DISTWIDTH] IN BLOOD BY AUTOMATED COUNT: 12.8 % (ref 11.5–14.5)
EST. GFR  (NO RACE VARIABLE): >60 ML/MIN/1.73 M^2
GLUCOSE SERPL-MCNC: 177 MG/DL (ref 70–110)
HCT VFR BLD AUTO: 34.1 % (ref 40–54)
HGB BLD-MCNC: 11.3 G/DL (ref 14–18)
IMM GRANULOCYTES # BLD AUTO: 0.05 K/UL (ref 0–0.04)
IMM GRANULOCYTES NFR BLD AUTO: 0.5 % (ref 0–0.5)
INR PPP: 1.1 (ref 0.8–1.2)
LYMPHOCYTES # BLD AUTO: 0.6 K/UL (ref 1–4.8)
LYMPHOCYTES NFR BLD: 5.9 % (ref 18–48)
MCH RBC QN AUTO: 30.1 PG (ref 27–31)
MCHC RBC AUTO-ENTMCNC: 33.1 G/DL (ref 32–36)
MCV RBC AUTO: 91 FL (ref 82–98)
MONOCYTES # BLD AUTO: 0.5 K/UL (ref 0.3–1)
MONOCYTES NFR BLD: 4.9 % (ref 4–15)
NEUTROPHILS # BLD AUTO: 9.6 K/UL (ref 1.8–7.7)
NEUTROPHILS NFR BLD: 88.3 % (ref 38–73)
NRBC BLD-RTO: 0 /100 WBC
PLATELET # BLD AUTO: 174 K/UL (ref 150–450)
PMV BLD AUTO: 10.6 FL (ref 9.2–12.9)
POTASSIUM SERPL-SCNC: 3.5 MMOL/L (ref 3.5–5.1)
PROT SERPL-MCNC: 6.3 G/DL (ref 6–8.4)
PROTHROMBIN TIME: 11.4 SEC (ref 9–12.5)
RBC # BLD AUTO: 3.75 M/UL (ref 4.6–6.2)
SODIUM SERPL-SCNC: 143 MMOL/L (ref 136–145)
SPECIMEN OUTDATE: NORMAL
WBC # BLD AUTO: 10.91 K/UL (ref 3.9–12.7)

## 2023-04-07 PROCEDURE — 93010 EKG 12-LEAD: ICD-10-PCS | Mod: ,,, | Performed by: INTERNAL MEDICINE

## 2023-04-07 PROCEDURE — 85025 COMPLETE CBC W/AUTO DIFF WBC: CPT | Performed by: EMERGENCY MEDICINE

## 2023-04-07 PROCEDURE — 36415 COLL VENOUS BLD VENIPUNCTURE: CPT | Performed by: EMERGENCY MEDICINE

## 2023-04-07 PROCEDURE — 99285 EMERGENCY DEPT VISIT HI MDM: CPT | Mod: 25

## 2023-04-07 PROCEDURE — 93010 ELECTROCARDIOGRAM REPORT: CPT | Mod: ,,, | Performed by: INTERNAL MEDICINE

## 2023-04-07 PROCEDURE — 80053 COMPREHEN METABOLIC PANEL: CPT | Performed by: EMERGENCY MEDICINE

## 2023-04-07 PROCEDURE — 12000002 HC ACUTE/MED SURGE SEMI-PRIVATE ROOM

## 2023-04-07 PROCEDURE — 93005 ELECTROCARDIOGRAM TRACING: CPT

## 2023-04-07 PROCEDURE — 85610 PROTHROMBIN TIME: CPT | Performed by: EMERGENCY MEDICINE

## 2023-04-07 PROCEDURE — 86900 BLOOD TYPING SEROLOGIC ABO: CPT | Performed by: EMERGENCY MEDICINE

## 2023-04-07 RX ORDER — GLUCAGON 1 MG
1 KIT INJECTION
Status: DISCONTINUED | OUTPATIENT
Start: 2023-04-08 | End: 2023-04-12 | Stop reason: HOSPADM

## 2023-04-07 RX ORDER — LANOLIN ALCOHOL/MO/W.PET/CERES
800 CREAM (GRAM) TOPICAL
Status: DISCONTINUED | OUTPATIENT
Start: 2023-04-08 | End: 2023-04-12 | Stop reason: HOSPADM

## 2023-04-07 RX ORDER — AMOXICILLIN 250 MG
1 CAPSULE ORAL 2 TIMES DAILY
Status: DISCONTINUED | OUTPATIENT
Start: 2023-04-07 | End: 2023-04-12 | Stop reason: HOSPADM

## 2023-04-07 RX ORDER — METOPROLOL SUCCINATE 25 MG/1
25 TABLET, EXTENDED RELEASE ORAL DAILY
Status: DISCONTINUED | OUTPATIENT
Start: 2023-04-08 | End: 2023-04-12 | Stop reason: HOSPADM

## 2023-04-07 RX ORDER — POLYETHYLENE GLYCOL 3350 17 G/17G
17 POWDER, FOR SOLUTION ORAL 2 TIMES DAILY PRN
Status: DISCONTINUED | OUTPATIENT
Start: 2023-04-08 | End: 2023-04-12 | Stop reason: HOSPADM

## 2023-04-07 RX ORDER — FENOFIBRATE 160 MG/1
160 TABLET ORAL DAILY
Status: DISCONTINUED | OUTPATIENT
Start: 2023-04-08 | End: 2023-04-12 | Stop reason: HOSPADM

## 2023-04-07 RX ORDER — IBUPROFEN 200 MG
16 TABLET ORAL
Status: DISCONTINUED | OUTPATIENT
Start: 2023-04-08 | End: 2023-04-12 | Stop reason: HOSPADM

## 2023-04-07 RX ORDER — IBUPROFEN 200 MG
24 TABLET ORAL
Status: DISCONTINUED | OUTPATIENT
Start: 2023-04-08 | End: 2023-04-12 | Stop reason: HOSPADM

## 2023-04-07 RX ORDER — TALC
6 POWDER (GRAM) TOPICAL NIGHTLY PRN
Status: DISCONTINUED | OUTPATIENT
Start: 2023-04-08 | End: 2023-04-12 | Stop reason: HOSPADM

## 2023-04-07 RX ORDER — ACETAMINOPHEN 325 MG/1
650 TABLET ORAL EVERY 8 HOURS PRN
Status: DISCONTINUED | OUTPATIENT
Start: 2023-04-08 | End: 2023-04-12 | Stop reason: HOSPADM

## 2023-04-07 RX ORDER — MAG HYDROX/ALUMINUM HYD/SIMETH 200-200-20
30 SUSPENSION, ORAL (FINAL DOSE FORM) ORAL 4 TIMES DAILY PRN
Status: DISCONTINUED | OUTPATIENT
Start: 2023-04-08 | End: 2023-04-12 | Stop reason: HOSPADM

## 2023-04-07 RX ORDER — HYDROCODONE BITARTRATE AND ACETAMINOPHEN 5; 325 MG/1; MG/1
1 TABLET ORAL EVERY 6 HOURS PRN
Status: DISCONTINUED | OUTPATIENT
Start: 2023-04-08 | End: 2023-04-12 | Stop reason: HOSPADM

## 2023-04-07 RX ORDER — TAMSULOSIN HYDROCHLORIDE 0.4 MG/1
0.4 CAPSULE ORAL NIGHTLY
Status: DISCONTINUED | OUTPATIENT
Start: 2023-04-08 | End: 2023-04-12 | Stop reason: HOSPADM

## 2023-04-07 RX ORDER — ATORVASTATIN CALCIUM 40 MG/1
40 TABLET, FILM COATED ORAL DAILY
Status: DISCONTINUED | OUTPATIENT
Start: 2023-04-08 | End: 2023-04-12 | Stop reason: HOSPADM

## 2023-04-07 RX ORDER — ONDANSETRON 2 MG/ML
4 INJECTION INTRAMUSCULAR; INTRAVENOUS EVERY 6 HOURS PRN
Status: DISCONTINUED | OUTPATIENT
Start: 2023-04-08 | End: 2023-04-12 | Stop reason: HOSPADM

## 2023-04-07 RX ORDER — NALOXONE HCL 0.4 MG/ML
0.02 VIAL (ML) INJECTION
Status: DISCONTINUED | OUTPATIENT
Start: 2023-04-08 | End: 2023-04-12 | Stop reason: HOSPADM

## 2023-04-07 RX ORDER — GABAPENTIN 300 MG/1
300 CAPSULE ORAL 3 TIMES DAILY
Status: DISCONTINUED | OUTPATIENT
Start: 2023-04-08 | End: 2023-04-12 | Stop reason: HOSPADM

## 2023-04-07 RX ORDER — LEVOTHYROXINE SODIUM 50 UG/1
50 TABLET ORAL
Status: DISCONTINUED | OUTPATIENT
Start: 2023-04-08 | End: 2023-04-12 | Stop reason: HOSPADM

## 2023-04-07 RX ORDER — SODIUM CHLORIDE 0.9 % (FLUSH) 0.9 %
10 SYRINGE (ML) INJECTION EVERY 12 HOURS PRN
Status: DISCONTINUED | OUTPATIENT
Start: 2023-04-08 | End: 2023-04-12 | Stop reason: HOSPADM

## 2023-04-07 RX ORDER — MORPHINE SULFATE 2 MG/ML
2 INJECTION, SOLUTION INTRAMUSCULAR; INTRAVENOUS EVERY 4 HOURS PRN
Status: DISCONTINUED | OUTPATIENT
Start: 2023-04-08 | End: 2023-04-12 | Stop reason: HOSPADM

## 2023-04-08 ENCOUNTER — ANESTHESIA (OUTPATIENT)
Dept: SURGERY | Facility: HOSPITAL | Age: 83
DRG: 481 | End: 2023-04-08
Payer: MEDICARE

## 2023-04-08 ENCOUNTER — ANESTHESIA EVENT (OUTPATIENT)
Dept: SURGERY | Facility: HOSPITAL | Age: 83
DRG: 481 | End: 2023-04-08
Payer: MEDICARE

## 2023-04-08 PROBLEM — S72.141A CLOSED INTERTROCHANTERIC FRACTURE OF RIGHT FEMUR: Status: ACTIVE | Noted: 2023-04-08

## 2023-04-08 PROBLEM — S51.011A SKIN TEAR OF RIGHT ELBOW WITHOUT COMPLICATION: Status: ACTIVE | Noted: 2023-04-08

## 2023-04-08 PROBLEM — Z71.89 ADVANCED CARE PLANNING/COUNSELING DISCUSSION: Status: ACTIVE | Noted: 2023-04-08

## 2023-04-08 LAB
ALBUMIN SERPL BCP-MCNC: 3.2 G/DL (ref 3.5–5.2)
ALP SERPL-CCNC: 60 U/L (ref 55–135)
ALT SERPL W/O P-5'-P-CCNC: 8 U/L (ref 10–44)
ANION GAP SERPL CALC-SCNC: 9 MMOL/L (ref 8–16)
AST SERPL-CCNC: 12 U/L (ref 10–40)
BASOPHILS # BLD AUTO: 0.02 K/UL (ref 0–0.2)
BASOPHILS NFR BLD: 0.3 % (ref 0–1.9)
BILIRUB SERPL-MCNC: 0.4 MG/DL (ref 0.1–1)
BUN SERPL-MCNC: 17 MG/DL (ref 8–23)
CALCIUM SERPL-MCNC: 9 MG/DL (ref 8.7–10.5)
CHLORIDE SERPL-SCNC: 108 MMOL/L (ref 95–110)
CO2 SERPL-SCNC: 25 MMOL/L (ref 23–29)
CREAT SERPL-MCNC: 0.7 MG/DL (ref 0.5–1.4)
DIFFERENTIAL METHOD: ABNORMAL
EOSINOPHIL # BLD AUTO: 0 K/UL (ref 0–0.5)
EOSINOPHIL NFR BLD: 0.1 % (ref 0–8)
ERYTHROCYTE [DISTWIDTH] IN BLOOD BY AUTOMATED COUNT: 13 % (ref 11.5–14.5)
EST. GFR  (NO RACE VARIABLE): >60 ML/MIN/1.73 M^2
GLUCOSE SERPL-MCNC: 138 MG/DL (ref 70–110)
HCT VFR BLD AUTO: 30.2 % (ref 40–54)
HGB BLD-MCNC: 10 G/DL (ref 14–18)
IMM GRANULOCYTES # BLD AUTO: 0.05 K/UL (ref 0–0.04)
IMM GRANULOCYTES NFR BLD AUTO: 0.7 % (ref 0–0.5)
LYMPHOCYTES # BLD AUTO: 0.7 K/UL (ref 1–4.8)
LYMPHOCYTES NFR BLD: 9.4 % (ref 18–48)
MAGNESIUM SERPL-MCNC: 1.7 MG/DL (ref 1.6–2.6)
MCH RBC QN AUTO: 29.9 PG (ref 27–31)
MCHC RBC AUTO-ENTMCNC: 33.1 G/DL (ref 32–36)
MCV RBC AUTO: 90 FL (ref 82–98)
MONOCYTES # BLD AUTO: 0.6 K/UL (ref 0.3–1)
MONOCYTES NFR BLD: 7.5 % (ref 4–15)
NEUTROPHILS # BLD AUTO: 6 K/UL (ref 1.8–7.7)
NEUTROPHILS NFR BLD: 82 % (ref 38–73)
NRBC BLD-RTO: 0 /100 WBC
PHOSPHATE SERPL-MCNC: 3.6 MG/DL (ref 2.7–4.5)
PLATELET # BLD AUTO: 175 K/UL (ref 150–450)
PMV BLD AUTO: 11.1 FL (ref 9.2–12.9)
POTASSIUM SERPL-SCNC: 3.4 MMOL/L (ref 3.5–5.1)
PROT SERPL-MCNC: 5.6 G/DL (ref 6–8.4)
RBC # BLD AUTO: 3.34 M/UL (ref 4.6–6.2)
SODIUM SERPL-SCNC: 142 MMOL/L (ref 136–145)
WBC # BLD AUTO: 7.31 K/UL (ref 3.9–12.7)

## 2023-04-08 PROCEDURE — 63600175 PHARM REV CODE 636 W HCPCS: Performed by: NURSE ANESTHETIST, CERTIFIED REGISTERED

## 2023-04-08 PROCEDURE — 27200651 HC AIRWAY, LMA: Performed by: ANESTHESIOLOGY

## 2023-04-08 PROCEDURE — 25000003 PHARM REV CODE 250: Performed by: NURSE PRACTITIONER

## 2023-04-08 PROCEDURE — 84100 ASSAY OF PHOSPHORUS: CPT | Performed by: NURSE PRACTITIONER

## 2023-04-08 PROCEDURE — D9220A PRA ANESTHESIA: Mod: ANES,,, | Performed by: ANESTHESIOLOGY

## 2023-04-08 PROCEDURE — 25000003 PHARM REV CODE 250: Performed by: ANESTHESIOLOGY

## 2023-04-08 PROCEDURE — 25000003 PHARM REV CODE 250: Performed by: NURSE ANESTHETIST, CERTIFIED REGISTERED

## 2023-04-08 PROCEDURE — 80053 COMPREHEN METABOLIC PANEL: CPT | Performed by: NURSE PRACTITIONER

## 2023-04-08 PROCEDURE — 83735 ASSAY OF MAGNESIUM: CPT | Performed by: NURSE PRACTITIONER

## 2023-04-08 PROCEDURE — 94799 UNLISTED PULMONARY SVC/PX: CPT

## 2023-04-08 PROCEDURE — 25000003 PHARM REV CODE 250: Performed by: ORTHOPAEDIC SURGERY

## 2023-04-08 PROCEDURE — 37000008 HC ANESTHESIA 1ST 15 MINUTES: Performed by: ORTHOPAEDIC SURGERY

## 2023-04-08 PROCEDURE — 12000002 HC ACUTE/MED SURGE SEMI-PRIVATE ROOM

## 2023-04-08 PROCEDURE — 36000711: Performed by: ORTHOPAEDIC SURGERY

## 2023-04-08 PROCEDURE — 51798 US URINE CAPACITY MEASURE: CPT

## 2023-04-08 PROCEDURE — 37000009 HC ANESTHESIA EA ADD 15 MINS: Performed by: ORTHOPAEDIC SURGERY

## 2023-04-08 PROCEDURE — C1713 ANCHOR/SCREW BN/BN,TIS/BN: HCPCS | Performed by: ORTHOPAEDIC SURGERY

## 2023-04-08 PROCEDURE — C1769 GUIDE WIRE: HCPCS | Performed by: ORTHOPAEDIC SURGERY

## 2023-04-08 PROCEDURE — D9220A PRA ANESTHESIA: ICD-10-PCS | Mod: ANES,,, | Performed by: ANESTHESIOLOGY

## 2023-04-08 PROCEDURE — D9220A PRA ANESTHESIA: ICD-10-PCS | Mod: CRNA,,, | Performed by: NURSE ANESTHETIST, CERTIFIED REGISTERED

## 2023-04-08 PROCEDURE — 71000033 HC RECOVERY, INTIAL HOUR: Performed by: ORTHOPAEDIC SURGERY

## 2023-04-08 PROCEDURE — D9220A PRA ANESTHESIA: Mod: CRNA,,, | Performed by: NURSE ANESTHETIST, CERTIFIED REGISTERED

## 2023-04-08 PROCEDURE — 99900035 HC TECH TIME PER 15 MIN (STAT)

## 2023-04-08 PROCEDURE — 63600175 PHARM REV CODE 636 W HCPCS: Performed by: ORTHOPAEDIC SURGERY

## 2023-04-08 PROCEDURE — 63600175 PHARM REV CODE 636 W HCPCS: Performed by: NURSE PRACTITIONER

## 2023-04-08 PROCEDURE — 71000039 HC RECOVERY, EACH ADD'L HOUR: Performed by: ORTHOPAEDIC SURGERY

## 2023-04-08 PROCEDURE — 85025 COMPLETE CBC W/AUTO DIFF WBC: CPT | Performed by: NURSE PRACTITIONER

## 2023-04-08 PROCEDURE — 36415 COLL VENOUS BLD VENIPUNCTURE: CPT | Performed by: NURSE PRACTITIONER

## 2023-04-08 PROCEDURE — 36000710: Performed by: ORTHOPAEDIC SURGERY

## 2023-04-08 DEVICE — BLADE HELICAL PERF GOLD 100MM: Type: IMPLANTABLE DEVICE | Site: FEMUR | Status: FUNCTIONAL

## 2023-04-08 DEVICE — SCREW STRDRV REC T25 5X44 TTNM: Type: IMPLANTABLE DEVICE | Site: FEMUR | Status: FUNCTIONAL

## 2023-04-08 RX ORDER — FENTANYL CITRATE 50 UG/ML
25 INJECTION, SOLUTION INTRAMUSCULAR; INTRAVENOUS EVERY 5 MIN PRN
Status: DISCONTINUED | OUTPATIENT
Start: 2023-04-08 | End: 2023-04-08

## 2023-04-08 RX ORDER — CEFAZOLIN SODIUM 2 G/50ML
2 SOLUTION INTRAVENOUS
Status: CANCELLED | OUTPATIENT
Start: 2023-04-08

## 2023-04-08 RX ORDER — ONDANSETRON 2 MG/ML
4 INJECTION INTRAMUSCULAR; INTRAVENOUS ONCE
Status: DISCONTINUED | OUTPATIENT
Start: 2023-04-08 | End: 2023-04-08

## 2023-04-08 RX ORDER — FENTANYL CITRATE 50 UG/ML
INJECTION, SOLUTION INTRAMUSCULAR; INTRAVENOUS
Status: DISCONTINUED | OUTPATIENT
Start: 2023-04-08 | End: 2023-04-08

## 2023-04-08 RX ORDER — ASPIRIN 325 MG
325 TABLET ORAL 2 TIMES DAILY
Status: DISCONTINUED | OUTPATIENT
Start: 2023-04-08 | End: 2023-04-12 | Stop reason: HOSPADM

## 2023-04-08 RX ORDER — PHENYLEPHRINE HYDROCHLORIDE 10 MG/ML
INJECTION INTRAVENOUS
Status: DISCONTINUED | OUTPATIENT
Start: 2023-04-08 | End: 2023-04-08

## 2023-04-08 RX ORDER — ACETAMINOPHEN 10 MG/ML
INJECTION, SOLUTION INTRAVENOUS
Status: DISCONTINUED | OUTPATIENT
Start: 2023-04-08 | End: 2023-04-08

## 2023-04-08 RX ORDER — ONDANSETRON 2 MG/ML
INJECTION INTRAMUSCULAR; INTRAVENOUS
Status: DISCONTINUED | OUTPATIENT
Start: 2023-04-08 | End: 2023-04-08

## 2023-04-08 RX ORDER — PROPOFOL 10 MG/ML
VIAL (ML) INTRAVENOUS
Status: DISCONTINUED | OUTPATIENT
Start: 2023-04-08 | End: 2023-04-08

## 2023-04-08 RX ORDER — CEFAZOLIN SODIUM 2 G/50ML
2 SOLUTION INTRAVENOUS
Status: COMPLETED | OUTPATIENT
Start: 2023-04-08 | End: 2023-04-09

## 2023-04-08 RX ORDER — MEPERIDINE HYDROCHLORIDE 50 MG/ML
12.5 INJECTION INTRAMUSCULAR; INTRAVENOUS; SUBCUTANEOUS ONCE
Status: DISCONTINUED | OUTPATIENT
Start: 2023-04-08 | End: 2023-04-08

## 2023-04-08 RX ORDER — HYDROMORPHONE HYDROCHLORIDE 2 MG/ML
0.2 INJECTION, SOLUTION INTRAMUSCULAR; INTRAVENOUS; SUBCUTANEOUS EVERY 5 MIN PRN
Status: DISCONTINUED | OUTPATIENT
Start: 2023-04-08 | End: 2023-04-08

## 2023-04-08 RX ORDER — LIDOCAINE HYDROCHLORIDE 20 MG/ML
INJECTION INTRAVENOUS
Status: DISCONTINUED | OUTPATIENT
Start: 2023-04-08 | End: 2023-04-08

## 2023-04-08 RX ORDER — KETOROLAC TROMETHAMINE 30 MG/ML
INJECTION, SOLUTION INTRAMUSCULAR; INTRAVENOUS
Status: DISCONTINUED | OUTPATIENT
Start: 2023-04-08 | End: 2023-04-08

## 2023-04-08 RX ORDER — OXYCODONE HYDROCHLORIDE 5 MG/1
5 TABLET ORAL
Status: DISCONTINUED | OUTPATIENT
Start: 2023-04-08 | End: 2023-04-08

## 2023-04-08 RX ORDER — CEFAZOLIN SODIUM 1 G/3ML
INJECTION, POWDER, FOR SOLUTION INTRAMUSCULAR; INTRAVENOUS
Status: DISCONTINUED | OUTPATIENT
Start: 2023-04-08 | End: 2023-04-08

## 2023-04-08 RX ORDER — SODIUM CHLORIDE 0.9 % (FLUSH) 0.9 %
5 SYRINGE (ML) INJECTION
Status: DISCONTINUED | OUTPATIENT
Start: 2023-04-08 | End: 2023-04-12 | Stop reason: HOSPADM

## 2023-04-08 RX ORDER — DEXAMETHASONE SODIUM PHOSPHATE 4 MG/ML
INJECTION, SOLUTION INTRA-ARTICULAR; INTRALESIONAL; INTRAMUSCULAR; INTRAVENOUS; SOFT TISSUE
Status: DISCONTINUED | OUTPATIENT
Start: 2023-04-08 | End: 2023-04-08

## 2023-04-08 RX ORDER — SODIUM CHLORIDE 9 MG/ML
INJECTION, SOLUTION INTRAVENOUS CONTINUOUS
Status: CANCELLED | OUTPATIENT
Start: 2023-04-08

## 2023-04-08 RX ORDER — DIPHENHYDRAMINE HYDROCHLORIDE 50 MG/ML
12.5 INJECTION INTRAMUSCULAR; INTRAVENOUS EVERY 6 HOURS PRN
Status: DISCONTINUED | OUTPATIENT
Start: 2023-04-08 | End: 2023-04-08

## 2023-04-08 RX ORDER — SODIUM CHLORIDE 9 MG/ML
INJECTION, SOLUTION INTRAVENOUS CONTINUOUS
Status: DISCONTINUED | OUTPATIENT
Start: 2023-04-08 | End: 2023-04-12 | Stop reason: HOSPADM

## 2023-04-08 RX ORDER — ACETAMINOPHEN 10 MG/ML
1000 INJECTION, SOLUTION INTRAVENOUS EVERY 8 HOURS
Status: COMPLETED | OUTPATIENT
Start: 2023-04-08 | End: 2023-04-09

## 2023-04-08 RX ADMIN — CEFAZOLIN 2 G: 1 INJECTION, POWDER, FOR SOLUTION INTRAVENOUS at 02:04

## 2023-04-08 RX ADMIN — SODIUM CHLORIDE, SODIUM GLUCONATE, SODIUM ACETATE, POTASSIUM CHLORIDE, MAGNESIUM CHLORIDE, SODIUM PHOSPHATE, DIBASIC, AND POTASSIUM PHOSPHATE: .53; .5; .37; .037; .03; .012; .00082 INJECTION, SOLUTION INTRAVENOUS at 02:04

## 2023-04-08 RX ADMIN — SODIUM CHLORIDE: 9 INJECTION, SOLUTION INTRAVENOUS at 07:04

## 2023-04-08 RX ADMIN — PHENYLEPHRINE HYDROCHLORIDE 100 MCG: 10 INJECTION INTRAVENOUS at 03:04

## 2023-04-08 RX ADMIN — MORPHINE SULFATE 2 MG: 2 INJECTION, SOLUTION INTRAMUSCULAR; INTRAVENOUS at 11:04

## 2023-04-08 RX ADMIN — PROPOFOL 130 MG: 10 INJECTION, EMULSION INTRAVENOUS at 02:04

## 2023-04-08 RX ADMIN — FENTANYL CITRATE 25 MCG: 50 INJECTION, SOLUTION INTRAMUSCULAR; INTRAVENOUS at 02:04

## 2023-04-08 RX ADMIN — METOPROLOL SUCCINATE 25 MG: 25 TABLET, EXTENDED RELEASE ORAL at 10:04

## 2023-04-08 RX ADMIN — ACETAMINOPHEN 1000 MG: 10 INJECTION INTRAVENOUS at 09:04

## 2023-04-08 RX ADMIN — CEFAZOLIN SODIUM 2 G: 2 SOLUTION INTRAVENOUS at 08:04

## 2023-04-08 RX ADMIN — ONDANSETRON 4 MG: 2 INJECTION INTRAMUSCULAR; INTRAVENOUS at 02:04

## 2023-04-08 RX ADMIN — PHENYLEPHRINE HYDROCHLORIDE 200 MCG: 10 INJECTION INTRAVENOUS at 03:04

## 2023-04-08 RX ADMIN — FENTANYL CITRATE 25 MCG: 50 INJECTION, SOLUTION INTRAMUSCULAR; INTRAVENOUS at 03:04

## 2023-04-08 RX ADMIN — TAMSULOSIN HYDROCHLORIDE 0.4 MG: 0.4 CAPSULE ORAL at 08:04

## 2023-04-08 RX ADMIN — OXYCODONE HYDROCHLORIDE 5 MG: 5 TABLET ORAL at 04:04

## 2023-04-08 RX ADMIN — SODIUM CHLORIDE: 9 INJECTION, SOLUTION INTRAVENOUS at 04:04

## 2023-04-08 RX ADMIN — PHENYLEPHRINE HYDROCHLORIDE 100 MCG: 10 INJECTION INTRAVENOUS at 02:04

## 2023-04-08 RX ADMIN — LIDOCAINE HYDROCHLORIDE 100 MG: 20 INJECTION, SOLUTION INTRAVENOUS at 02:04

## 2023-04-08 RX ADMIN — ASPIRIN 325 MG: 325 TABLET ORAL at 06:04

## 2023-04-08 RX ADMIN — DOCUSATE SODIUM AND SENNOSIDES 1 TABLET: 8.6; 5 TABLET, FILM COATED ORAL at 12:04

## 2023-04-08 RX ADMIN — ACETAMINOPHEN 1000 MG: 10 INJECTION, SOLUTION INTRAVENOUS at 02:04

## 2023-04-08 RX ADMIN — KETOROLAC TROMETHAMINE 30 MG: 30 INJECTION, SOLUTION INTRAMUSCULAR; INTRAVENOUS at 03:04

## 2023-04-08 RX ADMIN — GLYCOPYRROLATE 0.2 MG: 0.2 INJECTION, SOLUTION INTRAMUSCULAR; INTRAVITREAL at 02:04

## 2023-04-08 RX ADMIN — PHENYLEPHRINE HYDROCHLORIDE 20 MCG: 10 INJECTION INTRAVENOUS at 03:04

## 2023-04-08 RX ADMIN — GABAPENTIN 300 MG: 300 CAPSULE ORAL at 08:04

## 2023-04-08 RX ADMIN — DEXAMETHASONE SODIUM PHOSPHATE 4 MG: 4 INJECTION, SOLUTION INTRA-ARTICULAR; INTRALESIONAL; INTRAMUSCULAR; INTRAVENOUS; SOFT TISSUE at 02:04

## 2023-04-08 RX ADMIN — GABAPENTIN 300 MG: 300 CAPSULE ORAL at 06:04

## 2023-04-08 RX ADMIN — DOCUSATE SODIUM AND SENNOSIDES 1 TABLET: 8.6; 5 TABLET, FILM COATED ORAL at 08:04

## 2023-04-08 NOTE — ASSESSMENT & PLAN NOTE
Patient's anemia is currently controlled. Has not received any PRBCs to date..   Current CBC reviewed-   Lab Results   Component Value Date    HGB 10.0 (L) 04/08/2023    HCT 30.2 (L) 04/08/2023     Monitor serial CBC and transfuse if patient becomes hemodynamically unstable, symptomatic or H/H drops below 7/21.

## 2023-04-08 NOTE — PLAN OF CARE
VSS, pain tolerable, pain pill given, ice pack to R hip, dressing to R hip cdi, tolerated clear liquids without N/V, SCDs on to girma feet, IV fluids infusing. Ok per Dr. Russo to transfer the pt back to the floor. Pt transported via bed back to room 324. Pt's wife and family at the bedside. Call light within reach. Tele monitor [id number] placed back on pt. Report given to SOLE Zapata.

## 2023-04-08 NOTE — HOSPITAL COURSE
The patient was admitted to the hospital with right hip fracture.  Patient was monitored closely throughout his hospital stay.  Ortho was consulted on admission with plan for surgery.  Patient was found to have urinary retention and Simental catheter was placed. Patient underwent right hip repair with Dr. Rizvi on 4/8. Patient started on prn pain medication with adequate control of pain. Pt consulted post operatively who recommended Rehab/SNF. H&H monitored throughout course of stay. He received one unit of blood prior to discharge for decreasing H&H. Case management assisted with discharge to SNF. Patient stable for discharge to [facility]. Patient to follow up with orthopedics in 2-3 weeks. Patient to follow up with primary urologist for urinary retention once discharged from SNF. Patient verbalized understanding of discharge instructions and return precautions.    Physical Exam:  General- Patient alert and oriented x3 in NAD  HEENT- PERRLA, EOMI, OP clear, MMM  Neck- No JVD, Lymphadenopathy, Thyromegaly  CV- Regular rate and rhythm, No Murmur/harrison/rubs  Resp- Lungs CTA Bilaterally, No increased WOB  GI- Non tender/non-distended, BS normoactive x4 quads, no HSM  Extrem- No cyanosis, clubbing, edema. Pulses 2+ and symmetric. Right hip dressings CDI  Neuro- Strength 5/5 flexors/extensors, DTRs 2+ and symmetric, Intact sensation to light touch grossly

## 2023-04-08 NOTE — ANESTHESIA PREPROCEDURE EVALUATION
04/08/2023  Nimisha Longoria is a 83 y.o., male.    Pre-op Assessment    I have reviewed the Patient Summary Reports.    I have reviewed the Nursing Notes. I have reviewed the NPO Status.   I have reviewed the Medications.     Review of Systems  Anesthesia Hx:  No problems with previous Anesthesia    Hematology/Oncology:         -- Anemia: Hematology Comments: Hgb 10, type and screen sent   Cardiovascular:   Hypertension, well controlled CAD asymptomatic CABG/stent  ECG has been reviewed. AAA   Pulmonary:  Pulmonary Normal    Hepatic/GI:  Hepatic/GI Normal    Musculoskeletal:   Femur fxr Spine Disorders: lumbar    Neurological:   Neuromuscular Disease,    Endocrine:  Endocrine Normal        Physical Exam  General:  Well nourished      Airway/Jaw/Neck:  Airway Findings: Mouth Opening: Normal   Tongue: Normal   General Airway Assessment: Adult Mallampati: III  Jaw/Neck Findings:  Neck ROM: Extension Decreased, Mild       Dental:  Dental Findings: In tact     Chest/Lungs:  Chest/Lungs Findings: Clear to auscultation      Heart/Vascular:  Heart Findings: Rate: Normal  Rhythm: Regular Rhythm             Anesthesia Plan  Type of Anesthesia, risks & benefits discussed:  Anesthesia Type:  general, Gen Supraglottic Airway    Patient's Preference:   Plan Factors:          Intra-op Monitoring Plan: Standard ASA Monitors  Intra-op Monitoring Plan Comments:   Post Op Pain Control Plan: multimodal analgesia and IV/PO Opioids PRN  Post Op Pain Control Plan Comments:     Induction:   IV  Beta Blocker:         Informed Consent: Informed consent signed with the Patient and all parties understand the risks and agree with anesthesia plan.  All questions answered.  Anesthesia consent signed with patient.  ASA Score: 3   Emergent   Day of Surgery Review of History & Physical:              Ready For Surgery From Anesthesia  Perspective.           Physical Exam  General: Well nourished    Airway:  Mallampati: III   Mouth Opening: Normal  Tongue: Normal  Neck ROM: Extension Decreased, Mild    Dental:  In tact    Chest/Lungs:  Clear to auscultation    Heart:  Rate: Normal  Rhythm: Regular Rhythm          Anesthesia Plan  Type of Anesthesia, risks & benefits discussed:    Anesthesia Type: general, Gen Supraglottic Airway  Intra-op Monitoring Plan: Standard ASA Monitors  Post Op Pain Control Plan: multimodal analgesia and IV/PO Opioids PRN  Induction:  IV  Informed Consent: Informed consent signed with the Patient and all parties understand the risks and agree with anesthesia plan.  All questions answered.   ASA Score: 3 Emergent    Ready For Surgery From Anesthesia Perspective.       .

## 2023-04-08 NOTE — ED NOTES
Called 3rd floor to give report for patient coming to room 322, was told that the nurse was in another room and would have to call ER back for report.

## 2023-04-08 NOTE — SUBJECTIVE & OBJECTIVE
Interval History:  Patient seen and examined.  Overnight notes reviewed.  Patient was noted to have urinary retention overnight and Simental catheter was placed.  Patient reports right hip pain.  He denies chest pain, shortness a breath, or abdominal pain.  Ortho consulted with plan for surgery today.  PT/OT will be consulted postoperatively.    Review of Systems   Respiratory:  Negative for shortness of breath.    Cardiovascular:  Negative for chest pain.   Gastrointestinal:  Negative for abdominal pain and nausea.   Genitourinary:  Positive for difficulty urinating.   Musculoskeletal:  Positive for arthralgias and gait problem.   Objective:     Vital Signs (Most Recent):  Temp: 99.2 °F (37.3 °C) (04/08/23 0713)  Pulse: 77 (04/08/23 0713)  Resp: 17 (04/08/23 1143)  BP: (!) 117/58 (04/08/23 0713)  SpO2: 97 % (04/08/23 0713)   Vital Signs (24h Range):  Temp:  [98 °F (36.7 °C)-99.8 °F (37.7 °C)] 99.2 °F (37.3 °C)  Pulse:  [77-83] 77  Resp:  [17-18] 17  SpO2:  [97 %-98 %] 97 %  BP: (117-165)/(58-73) 117/58     Weight: 70.8 kg (156 lb 1.4 oz)  Body mass index is 23.73 kg/m².    Intake/Output Summary (Last 24 hours) at 4/8/2023 1224  Last data filed at 4/8/2023 0630  Gross per 24 hour   Intake --   Output 0 ml   Net 0 ml      Physical Exam  Vitals and nursing note reviewed.   Constitutional:       General: He is not in acute distress.     Appearance: Normal appearance. He is normal weight.   HENT:      Head: Normocephalic and atraumatic.      Mouth/Throat:      Mouth: Mucous membranes are moist.      Pharynx: Oropharynx is clear.   Eyes:      Extraocular Movements: Extraocular movements intact.      Pupils: Pupils are equal, round, and reactive to light.   Cardiovascular:      Rate and Rhythm: Normal rate and regular rhythm.      Pulses: Normal pulses.      Heart sounds: Normal heart sounds.   Pulmonary:      Effort: Pulmonary effort is normal.      Breath sounds: Normal breath sounds.   Abdominal:      General: Abdomen is  flat. Bowel sounds are normal.      Palpations: Abdomen is soft.   Musculoskeletal:         General: Normal range of motion.      Cervical back: Normal range of motion and neck supple.   Skin:     General: Skin is warm.      Capillary Refill: Capillary refill takes 2 to 3 seconds.   Neurological:      General: No focal deficit present.      Mental Status: He is alert and oriented to person, place, and time. Mental status is at baseline.   Psychiatric:         Mood and Affect: Mood normal.         Behavior: Behavior normal.       Significant Labs: All pertinent labs within the past 24 hours have been reviewed.  CBC:   Recent Labs   Lab 04/07/23 2205 04/08/23 0437   WBC 10.91 7.31   HGB 11.3* 10.0*   HCT 34.1* 30.2*    175     CMP:   Recent Labs   Lab 04/07/23 2205 04/08/23 0437    142   K 3.5 3.4*    108   CO2 25 25   * 138*   BUN 16 17   CREATININE 0.8 0.7   CALCIUM 9.3 9.0   PROT 6.3 5.6*   ALBUMIN 3.5 3.2*   BILITOT 0.4 0.4   ALKPHOS 67 60   AST 13 12   ALT 8* 8*   ANIONGAP 11 9     Coagulation:   Recent Labs   Lab 04/07/23 2205   INR 1.1       Significant Imaging: I have reviewed all pertinent imaging results/findings within the past 24 hours.

## 2023-04-08 NOTE — ANESTHESIA PROCEDURE NOTES
Intubation    Date/Time: 4/8/2023 2:41 PM  Performed by: Jaskaran Perry CRNA  Authorized by: Thompson Russo MD     Intubation:     Induction:  Intravenous    Intubated:  Postinduction    Mask Ventilation:  Not attempted    Attempts:  1    Difficult Airway Encountered?: No      Complications:  None    Airway Device:  Supraglottic airway/LMA    Airway Device Size:  4.0    Secured at:  The lips    Placement Verified By:  Capnometry    Complicating Factors:  None    Findings Post-Intubation:  BS equal bilateral and atraumatic/condition of teeth unchanged

## 2023-04-08 NOTE — CARE UPDATE
04/08/23 1700   Patient Assessment/Suction   Level of Consciousness (AVPU) alert   Respiratory Effort Unlabored;Normal   Expansion/Accessory Muscles/Retractions no use of accessory muscles;no retractions;expansion symmetric   PRE-TX-O2   Device (Oxygen Therapy) room air   SpO2 97 %   Pulse (!) 58   Resp 16   Incentive Spirometer   $ Incentive Spirometer Charges done with encouragement;postop instruction   Administration (IS) instruction provided, follow-up;mouthpiece utilized   Number of Repetitions (IS) 10   Level Incentive Spirometer (mL) 2000   Patient Tolerance (IS) good;no adverse signs/symptoms present

## 2023-04-08 NOTE — PLAN OF CARE
[facility]  Initial Discharge Assessment       Primary Care Provider: Zackery Haddad MD    Admission Diagnosis: Chest pain [R07.9]  Pre-op evaluation [Z01.818]  Closed fracture of right hip, initial encounter [S72.001A]  Hip injury, right, initial encounter [S79.911A]  Elbow injury, right, initial encounter [S59901A]    Admission Date: 4/7/2023  Expected Discharge Date:     Discharge Barriers Identified: None    Payor: [employer]ARE / Plan: [employer] 65 / Product Type: Medicare Advantage /     Extended Emergency Contact Information  Primary Emergency Contact: SwatiKarie  Address: [address] [place], [place] [address] [place] [address]  Home Phone: [phone]  Relation: Spouse  Secondary Emergency Contact: MorganMesha  [facility] Phone: [phone]  Relation: Daughter  Preferred language: English   needed? No    Discharge Plan A: Home, Home with family  Discharge Plan B: Home Health      [facility] - [place] [id number] [place] [address] [address]  Phone: [phone] Fax: [phone]    [facility] [place] - [address]  [address]  [facility] [address]  Phone: [phone] Fax: [phone]    [id number] - [place], [place] - [address]  [address]  [place] [address]  Phone: [phone] Fax: [phone]     met with patient at bedside to complete discharge planning assessment.  Patient alert and oriented xs 4.  Patient verified all demographic information on facesheet is correct.  Patient verified PCP is Dr. Haddad.  Patient verified primary health insurance is [employer].  Patient with NO home health but has listed DME.  Patient with POA (spouse) and Living Will.  Patient not on dialysis or medication coumadin.  Patient with no 30 day admission.  Patient with no financial issues at this time.   Patient family will provide transportation upon discharge from facility.  Patient independent with ADLs, live with spouse, spouse drives.      Initial Assessment (most recent)       Adult Discharge Assessment - 04/08/23 1342          Discharge Assessment    Assessment Type Discharge Planning Assessment     Confirmed/corrected address, phone number and insurance Yes     Confirmed Demographics Correct on Facesheet     Source of Information patient     Communicated DARYL with patient/caregiver Date not available/Unable to determine     People in Home spouse     Facility Arrived From: home     Do you expect to return to your current living situation? Yes     Do you have help at home or someone to help you manage your care at home? Yes     Who are your caregiver(s) and their phone number(s)? spouse     Prior to hospitilization cognitive status: Alert/Oriented     Current cognitive status: Alert/Oriented     Walking or Climbing Stairs ambulation difficulty, requires equipment;stair climbing difficulty, requires equipment     Equipment Currently Used at Home rollator     Readmission within 30 days? No     Patient currently being followed by outpatient case management? No     Do you currently have service(s) that help you manage your care at home? No     Do you take prescription medications? Yes     Do you have prescription coverage? Yes     Do you have any problems affording any of your prescribed medications? No     Is the patient taking medications as prescribed? yes     Who is going to help you get home at discharge? spouse     How do you get to doctors appointments? family or friend will provide     Are you on dialysis? No     Do you take coumadin? No     Discharge Plan A Home;Home with family     Discharge Plan B Home Health     DME Needed Upon Discharge  none     Discharge Plan discussed with: Patient     Discharge Barriers Identified None

## 2023-04-08 NOTE — PLAN OF CARE
Problem: Adult Inpatient Plan of Care  Goal: Plan of Care Review  Outcome: Ongoing, Progressing   On AM assessment pt supine with HB up 30. POC and medications reviewed with pt. Verbalized understanding. Tele [id number] in use. Saline lock in left fa with DDI. No redness or swelling at site. Simental cath in tact and draining clear yellow urine in drainage bag. SR up x 2. Call light in reach. Bed in lowest position with brakes locked. Will continue to monitor.   Problem: Adult Inpatient Plan of Care  Goal: Optimal Comfort and Wellbeing  Outcome: Ongoing, Progressing   Q 2 hourly rounds made through out shift and IV site, pain and position monitored. Prn meds given per MD order.

## 2023-04-08 NOTE — ASSESSMENT & PLAN NOTE
Admit to med/tele  Consult orthopedics - called per ED MD  NPO after midnight  Hold asa  - last dose this morning  T&S  Pain control  Will need PT/OT post op  Holding off on pharmacological VTE prophylaxis in the event he has surgical intervention in the morning, adjust as clinically indicated   Bedrest tonight

## 2023-04-08 NOTE — ASSESSMENT & PLAN NOTE
I spent 5 minutes of face to face discussion regarding advance directives and end of life planning which included patient and family. Patient/Family understands the seriousness of their condition and would like to make their end of life decisions known as follows-     Pt states he is a DNR in the presence of his daughter. Orders placed

## 2023-04-08 NOTE — CONSULTS
Orthopedic Surgery Consult    History of present illness:   Patient presents with right hip pain s/p fall from standing height.  Experienced immediate inability to bear weight on the extremity.  Transported to the ED where workup demonstrated a right hip fracture.  Admitted for medical optimization and orthopedic intervention.  This is a closed injury.  Patient's pre-injury ambulatory status was with a walker.      Allergies:   Review of patient's allergies indicates:  No Known Allergies      Past medical history:   Past Medical History:   Diagnosis Date    Abnormal SPEP 1/17/2021    Anemia     Anemia due to multiple mechanisms 1/17/2021    Anemia, chronic disease 1/17/2021    Coronary artery disease     Hyperlipidemia     Hypertension     Normochromic normocytic anemia 1/17/2021    Sacral chordoma          Past surgical history:  Past Surgical History:   Procedure Laterality Date    BACK SURGERY      CORONARY ARTERY BYPASS GRAFT      SPINE SURGERY      Dr Villatoro         Social history:   Reviewed per EPIC history for tobacco or alcohol use       Medications:    Current Facility-Administered Medications:     acetaminophen tablet 650 mg, 650 mg, Oral, Q8H PRN, Rani Dueñas NP    aluminum-magnesium hydroxide-simethicone 200-200-20 mg/5 mL suspension 30 mL, 30 mL, Oral, QID PRN, Rani Dueñas NP    atorvastatin tablet 40 mg, 40 mg, Oral, Daily, Rani Dueñas NP    dextrose 10% bolus 125 mL 125 mL, 12.5 g, Intravenous, PRN, Rani Dueñas NP    dextrose 10% bolus 250 mL 250 mL, 25 g, Intravenous, PRN, Rani Dueñas NP    fenofibrate tablet 160 mg, 160 mg, Oral, Daily, Rani Dueñas NP    gabapentin capsule 300 mg, 300 mg, Oral, TID, Rani Dueñas NP    glucagon (human recombinant) injection 1 mg, 1 mg, Intramuscular, PRN, Rani Dueñas NP    glucose chewable tablet 16 g, 16 g, Oral, PRN, Rani Dueñas NP    glucose chewable tablet 24 g, 24 g, Oral, PRN, Rani Dueñas NP     "HYDROcodone-acetaminophen 5-325 mg per tablet 1 tablet, 1 tablet, Oral, Q6H PRN, Rani Dueñas NP    levothyroxine tablet 50 mcg, 50 mcg, Oral, Before breakfast, Rani Dueñas NP    magnesium oxide tablet 800 mg, 800 mg, Oral, PRN, Rani Dueñas NP    magnesium oxide tablet 800 mg, 800 mg, Oral, PRN, Rani Dueñas NP    melatonin tablet 6 mg, 6 mg, Oral, Nightly PRN, Rani Dueñas NP    metoprolol succinate (TOPROL-XL) 24 hr tablet 25 mg, 25 mg, Oral, Daily, Rani Dueñas NP    morphine injection 2 mg, 2 mg, Intravenous, Q4H PRN, Rani Dueñas NP    naloxone 0.4 mg/mL injection 0.02 mg, 0.02 mg, Intravenous, PRN, Rani Dueñas NP    ondansetron injection 4 mg, 4 mg, Intravenous, Q6H PRN, Rani Dueñas NP    polyethylene glycol packet 17 g, 17 g, Oral, BID PRN, Rani Dueñas NP    potassium bicarbonate disintegrating tablet 35 mEq, 35 mEq, Oral, PRN, Rani Dueñas NP    potassium bicarbonate disintegrating tablet 50 mEq, 50 mEq, Oral, PRN, Rani Dueñas NP    potassium bicarbonate disintegrating tablet 60 mEq, 60 mEq, Oral, PRN, Rani Dueñas NP    senna-docusate 8.6-50 mg per tablet 1 tablet, 1 tablet, Oral, BID, Rani Dueñas NP, 1 tablet at 04/08/23 0001    sodium chloride 0.9% flush 10 mL, 10 mL, Intravenous, Q12H PRN, aRni Dueñas NP    tamsulosin 24 hr capsule 0.4 mg, 0.4 mg, Oral, QHS, Rani Dueñas NP        Physical Exam:   Vitals:    04/08/23 0000 04/08/23 0051 04/08/23 0307 04/08/23 0713   BP: 131/60 (!) 146/65 (!) 128/58 (!) 117/58   BP Location:  Right arm Right arm    Patient Position:  Lying Lying    Pulse: 80 77 82 77   Resp: 18 18 17 18   Temp:  98.9 °F (37.2 °C) 99.8 °F (37.7 °C) 99.2 °F (37.3 °C)   TempSrc:  Oral Oral    SpO2: 98% 98% 97% 97%   Weight: 70.8 kg (156 lb 1.4 oz)      Height: 5' 8" (1.727 m)        Recent Labs   Lab 04/08/23  0437   CALCIUM 9.0   PROT 5.6*      K 3.4*   CO2 25      BUN 17   CREATININE 0.7     Recent Labs "   Lab 04/08/23  0437   WBC 7.31   RBC 3.34*   HGB 10.0*   HCT 30.2*        Recent Labs   Lab 04/07/23  2205   INR 1.1         Awake/alert/oriented x3, No acute distress, Afebrile, Vital signs stable  Normocephalic, Atraumatic  Heart is beating at normal rate  Good inspiratory effort with unlaboured breathing  Abdomen soft/nondistended/nontender    right lower extremity  Resting position of the limb is shortened and externally rotated compared to contralateral  Motor intact L2-S1  Sensation intact L2-S1  2+ PT/DP pulses  Skin intact      Imaging:  Radiographs of the Right femur demonstrate intertrochanteric fracture      Assessment:   83 y.o. male with RIGHT hip fracture      Plan:   Will assume orthopedic care  Awaiting medical optimization/clearance for surgery  Will proceed to the OR for RIGHT femur cephalomedullary nailing  Provisionally scheduled for 1pm  Pain control per primary team  Bed rest    Tyler Rizvi MD  [facility]

## 2023-04-08 NOTE — ASSESSMENT & PLAN NOTE
Admit to med/tele  Consult orthopedics - called per ED MD  NPO after midnight  Hold asa  - last dose this morning  T&S  Pain control  Will need PT/OT post op  Holding off on pharmacological VTE prophylaxis in the event he has surgical intervention today, adjust as clinically indicated   Bedrest tonight     THIS IS NOT AN OFFICE VISIT. THIS IS AN ABSTRACT ENCOUNTER CREATED IN PREPARATION OF AN UPCOMING VISIT, NOT TO BE USED FOR DOCUMENTATION/TREATMENT PURPOSES.     AFL. Recent CVA. Referral by PCP.       Impression:   Paroxsymal Atrial Flutter/Atrial Fibrillation   Diagnosed during hospitalization for CVA 6/2021  Noted on ECG 4/5/2022 during PCP office visit.   Symptomatic with palpitations.  On Cardizem 360 mg daily for rate control.  Initial EP consultation 5/17/2022:   CHADSVASc 4  (CVA, HTN, DM)  Anticoagulated on Eliquis 5 mg BID   Initiated 8/2021 after CVA   Cardiac Imaging/Testing  ECG 4/5/2022: AFL  ECG 6/29/2021: AF/AFL with RVR  Echocardiogram 6/25/2021: EF 55-60%, TARIK 53.7 ml/m², grade II/IV diastolic dysfunction, elevated filling pressures, IVS 1.1 cm , LVPW 0.9 cm.       Nelson Joyce is a 52 year old male seen in office for consultation for a history of paroxysmal atrial flutter and atrial fibrillation. This was initially diagnosed back during a hospitalization for CVA 6/2021. He was initiated on Cardizem and formal anticoagulation when it was deemed safe by Neurology. He has been following closely with his primary care provided for blood pressure managed and during a visit 4/5/2022, it was noted that his heart rate was elevated. An ECG was obtained which did show recurrence of AFL. His Cardizem was increased to 360 mg daily for rate control and EP referral was placed. Since that OV, he was seen in the ED 5/4/2022 for chest pain/discomfort. ECG obtained during evaluation did show SR. All labs were negative as well. He was recommended to follow up with our office as scheduled.

## 2023-04-08 NOTE — OP NOTE
Orthopedic Surgery Operative Report      DATE OF PROCEDURE: 04/08/2023   PREOPERATIVE DIAGNOSIS: Hip injury, right, initial encounter [S79.911A]  POSTOPERATIVE DIAGNOSIS: Same.  PROCEDURE PERFORMED: Intramedullary nailing, right intertrochanteric femur fracture.  SURGEON: Surgeon(s) and Role:     * Tyler Rizvi MD - Primary   FIRST ASSISTANT  Jael Morgan  ESTIMATED BLOOD LOSS: 200 cc.  ANESTHESIA: General endotracheal.  IMPLANTS: Synthes trochanteric fixation nail [id number] size with 100 mm helical blade.    INDICATIONS: The patient is an 83 y.o. male who had a low energy fall sustaining a right intertrochanteric femur fracture. The patient presented to the Emergency Department and was evaluated by Orthopaedics and Internal Medicine. This procedure, as well as, alternatives to this procedure was discussed at length with the patient. Risks and benefits were also discussed. Risks include but are not limited to bleeding, infection, numbness, scarring, damage to major neurovascular structures, limb length/rotation discrepancy, failure of hardware, need for further surgery, loss of function, myocardial infarction, deep venous thrombosis, pulmonary embolism, nonunion, malunion and death. Patient understood these well and consented for the procedure as described..    DESCRIPTION OF PROCEDURE: The patient was identified in the preoperative holding area and site was marked. The patient was wheeled into the Operating Room and general endotracheal anesthesia was induced in the patient's hospital bed.  A timeout was taken to confirm the patient, site, surgery, surgeon and administration of preoperative antibiotics. All agreed and we proceeded. The patient was placed in fracture boots, transferred to the fracture table and manipulated under fluoroscopic control until we could obtain near anatomic alignment. The operative site was prepped and draped in the usual sterile fashion. A 3 cm longitudinal incision was made  over the lateral aspect of the hip just proximal and posterior to the greater trochanter. The gluteal fascia was incised and a hematoma was evacuated. A threaded guide pin was placed on the tip of the greater trochanter and entered into the proximal femur. This was checked under AP and lateral fluoroscopic views. A drill was used to breach the cortex and a guide pin was placed down the femoral canal distally to the physeal scar of the distal femur. A reamer was then used in a increasing incremental fashion to ream the femoral canal.  A 400mm nail was then placed without difficulty. The interlocking guide was then placed, and a 3 cm longitudinal incision was made over the lateral aspect of the thigh.  Subcutaneous tissue and fascia karlos were incised.  The guide pin was then placed in the interlocking hole for the proximal helical blade, and the guide pin was placed into the center/center position of the femoral neck and head as verified on AP and lateral fluoroscopic views.  This was measured at 100mm. The step drill was used to ream the lateral cortex of the femur, and the helical blade was placed. Attention was then given to the distal nail, and using the perfect circles technique, a drill bit was placed through the intramedullary nail and bilateral cortices.  A proper size and fit of the distal screw was also noted and placed. On fluoroscopic control, placement was confirmed in AP and lateral fluoroscopic views. A near anatomical alignment of the fracture site was completed and all hardware was properly fixed. All wounds were thoroughly irrigated and hemostasis confirmed. The fascial layers were then reapproximated using #1 Vicryl suture in a figure-of-eight fashion. The subcutaneous tissues were reapproximated in layers using 3-0 Vicryl sutures, and the skin was reapproximated with 3-0 Nylon in a horizontal mattress suture fashion. Sterile dressings were applied. All instrument and sponge counts were reported  correct at the end of the case. There were no complications. The patient was extubated, awakened and taken to the post anesthesia care unit in stable condition.     PLAN FOR THE PATIENT: Early mobilization of the operative limb.      Tyler Rizvi M.D.   [facility]

## 2023-04-08 NOTE — ANESTHESIA POSTPROCEDURE EVALUATION
Anesthesia Post Evaluation    Patient: Nimisha Longoria    Procedure(s) Performed: Procedure(s) (LRB):  INSERTION, INTRAMEDULLARY CHUCKY, FEMUR, synthes, hana table, 1st assist (Right)    Final Anesthesia Type: general      Patient location during evaluation: PACU  Patient participation: Yes- Able to Participate  Level of consciousness: sedated and awake  Post-procedure vital signs: reviewed and stable  Pain management: adequate  Airway patency: patent    PONV status at discharge: No PONV  Anesthetic complications: no      Cardiovascular status: hypertensive and blood pressure returned to baseline  Respiratory status: spontaneous ventilation  Hydration status: euvolemic  Follow-up not needed.          Vitals Value Taken Time   /77 04/08/23 1545   Temp  04/08/23 1550   Pulse 78 04/08/23 1549   Resp 12 04/08/23 1549   SpO2 100 % 04/08/23 1549   Vitals shown include unvalidated device data.      No case tracking events are documented in the log.      Pain/Ruiz Score: Pain Rating Prior to Med Admin: 7 (4/8/2023 11:43 AM)  Pain Rating Post Med Admin: 0 (4/8/2023 12:13 PM)

## 2023-04-08 NOTE — NURSING
Awake alert and oriented x4. Follows simple commands. Good historian. Wife at bedside. 20g to lt forearm with site free of s/s of infiltration.  VSS.

## 2023-04-08 NOTE — ASSESSMENT & PLAN NOTE
Does not currently follow with cardiologist  CABG >20 years ago, no recent stress test  [employer] cardiac risk - average cardiac risk for moderate risk surgery  He is medically optimized for surgery based on current available work up

## 2023-04-08 NOTE — NURSING
Pt had been unable to void during night. Multiple attempts to try to get pt to void were made but unsuccessful. Bladder scan performed and obtained reading >904. MD notified and order for oh given.

## 2023-04-08 NOTE — SUBJECTIVE & OBJECTIVE
Past Medical History:   Diagnosis Date    Abnormal SPEP 1/17/2021    Anemia     Anemia due to multiple mechanisms 1/17/2021    Anemia, chronic disease 1/17/2021    Coronary artery disease     Hyperlipidemia     Hypertension     Normochromic normocytic anemia 1/17/2021    Sacral chordoma        Past Surgical History:   Procedure Laterality Date    BACK SURGERY      CORONARY ARTERY BYPASS GRAFT      SPINE SURGERY      Dr Villatoro       Review of patient's allergies indicates:  No Known Allergies    No current facility-administered medications on file prior to encounter.     Current Outpatient Medications on File Prior to Encounter   Medication Sig    aspirin (ECOTRIN) 81 MG EC tablet Take 162 mg by mouth once daily.     atorvastatin (LIPITOR) 40 MG tablet Take 1 tablet (40 mg total) by mouth once daily.    betamethasone dipropionate 0.05 % cream Apply topically 2 (two) times daily.    clotrimazole (LOTRIMIN) 1 % cream Apply topically 2 (two) times daily. (Patient not taking: Reported on 4/12/2022)    fenofibrate 160 MG Tab Take 1 tablet (160 mg total) by mouth once daily.    gabapentin (NEURONTIN) 300 MG capsule Start with 1 capsule at bedtime for 1 week then increase to 1 capsule BID. May increase further 1 capsule TID as needed for neuropathy pain    levothyroxine (SYNTHROID) 50 MCG tablet Take 1 tablet (50 mcg total) by mouth before breakfast. First thing in the morning before breakfast. For thyroid.    metoprolol succinate (TOPROL-XL) 25 MG 24 hr tablet Take 1 tablet (25 mg total) by mouth once daily.    tamsulosin (FLOMAX) 0.4 mg Cap Take 1 capsule (0.4 mg total) by mouth every evening.     Family History    None       Tobacco Use    Smoking status: Former     Packs/day: 2.00     Years: 25.00     Pack years: 50.00     Types: Cigarettes    Smokeless tobacco: Never    Tobacco comments:     quit 1985   Substance and Sexual Activity    Alcohol use: No    Drug use: No    Sexual activity: Not on file     Review of  Systems   Constitutional:  Negative for chills, diaphoresis, fatigue and fever.   HENT:  Negative for congestion, ear pain, sore throat and trouble swallowing.    Eyes:  Negative for pain, discharge and visual disturbance.   Respiratory:  Negative for cough, chest tightness, shortness of breath and wheezing.    Cardiovascular:  Negative for chest pain, palpitations and leg swelling.   Gastrointestinal:  Negative for abdominal distention, abdominal pain, blood in stool, constipation, diarrhea, nausea and vomiting.   Endocrine: Negative for polydipsia, polyphagia and polyuria.   Genitourinary:  Negative for dysuria, flank pain, frequency and urgency.   Musculoskeletal:  Positive for arthralgias and gait problem. Negative for back pain, joint swelling, neck pain and neck stiffness.   Skin:  Positive for wound. Negative for rash.   Allergic/Immunologic: Negative for immunocompromised state.   Neurological:  Negative for dizziness, syncope, speech difficulty, weakness, light-headedness, numbness and headaches.   Hematological:  Negative for adenopathy.   Psychiatric/Behavioral:  Negative for confusion and suicidal ideas. The patient is not nervous/anxious.    All other systems reviewed and are negative.  Objective:     Vital Signs (Most Recent):  Temp: 98 °F (36.7 °C) (04/07/23 2048)  Pulse: 80 (04/08/23 0000)  Resp: 18 (04/08/23 0000)  BP: 131/60 (04/08/23 0000)  SpO2: 98 % (04/08/23 0000) Vital Signs (24h Range):  Temp:  [98 °F (36.7 °C)] 98 °F (36.7 °C)  Pulse:  [77-83] 80  Resp:  [18] 18  SpO2:  [97 %-98 %] 98 %  BP: (131-165)/(60-73) 131/60     Weight: 70.8 kg (156 lb 1.4 oz)  Body mass index is 23.73 kg/m².    Physical Exam  Vitals and nursing note reviewed.   Constitutional:       Appearance: He is well-developed.   HENT:      Head: Normocephalic and atraumatic.   Eyes:      Conjunctiva/sclera: Conjunctivae normal.      Pupils: Pupils are equal, round, and reactive to light.   Cardiovascular:      Rate and Rhythm:  Normal rate and regular rhythm.      Pulses: Normal pulses.   Pulmonary:      Effort: Pulmonary effort is normal.      Breath sounds: Normal breath sounds.   Abdominal:      General: Bowel sounds are normal.      Palpations: Abdomen is soft.   Musculoskeletal:         General: Deformity present.      Cervical back: Normal range of motion and neck supple.      Comments: Right leg shortened with external rotation   Skin:     General: Skin is warm and dry.      Capillary Refill: Capillary refill takes less than 2 seconds.   Neurological:      Mental Status: He is alert and oriented to person, place, and time. Mental status is at baseline.   Psychiatric:         Behavior: Behavior normal.         Thought Content: Thought content normal.         Judgment: Judgment normal.         CRANIAL NERVES     CN III, IV, VI   Pupils are equal, round, and reactive to light.     Significant Labs: All pertinent labs within the past 24 hours have been reviewed.  CBC:   Recent Labs   Lab 04/07/23  2205   WBC 10.91   HGB 11.3*   HCT 34.1*        CMP:   Recent Labs   Lab 04/07/23  2205      K 3.5      CO2 25   *   BUN 16   CREATININE 0.8   CALCIUM 9.3   PROT 6.3   ALBUMIN 3.5   BILITOT 0.4   ALKPHOS 67   AST 13   ALT 8*   ANIONGAP 11       Significant Imaging: I have reviewed all pertinent imaging results/findings within the past 24 hours.  EKG: I have reviewed all pertinent results/findings within the past 24 hours and my personal findings are: NSR with occasional PVCs, no acute ischemic changes     X-Ray Elbow Complete Right    Result Date: 4/7/2023  EXAMINATION: XR ELBOW COMPLETE 3 VIEW RIGHT CLINICAL HISTORY: . Unspecified injury of right elbow, initial encounter TECHNIQUE: AP, lateral, and oblique views of the right elbow were performed. COMPARISON: None FINDINGS: There is no acute fracture or dislocation.  Alignment is normal.  Joint spaces are preserved.  There is no elbow joint effusion.     No acute  osseous abnormality. Electronically signed by: Boogie Ely Date:    04/07/2023 Time:    21:39    X-Ray Hip 2 or 3 views Right (with Pelvis when performed)    Result Date: 4/7/2023  EXAMINATION: XR HIP WITH PELVIS WHEN PERFORMED, 2 OR 3  VIEWS RIGHT CLINICAL HISTORY: Unspecified injury of right hip, initial encounter TECHNIQUE: AP view of the pelvis and frog leg lateral view of the right hip were performed. COMPARISON: 07/20/2015. FINDINGS: There is osteopenia.  There is a medially angulated intertrochanteric fracture of the right proximal femur.  No additional fractures are seen.  There are vascular calcifications.  There are degenerative changes.     Right femoral intertrochanteric fracture. Electronically signed by: Boogie Ely Date:    04/07/2023 Time:    21:41    X-Ray Femur Ap/Lat Right    Result Date: 4/7/2023  EXAMINATION: XR FEMUR 2 VIEW RIGHT CLINICAL HISTORY: Encounter for other preprocedural examination TECHNIQUE: AP and lateral views of the right femur were performed. COMPARISON: Radiographs from earlier the same date. FINDINGS: There is osteopenia.  There is an intertrochanteric fracture of the right proximal femur.  Alignment is unchanged from prior.  There are degenerative changes of the knee.  There is a small suprapatellar joint effusion.  There are vascular     Right femoral intertrochanteric fracture. Electronically signed by: Boogie Ely Date:    04/07/2023 Time:    23:09    X-Ray Chest AP Portable    Result Date: 4/7/2023  EXAMINATION: XR CHEST AP PORTABLE CLINICAL HISTORY: Encounter for other preprocedural examination TECHNIQUE: Single frontal view of the chest was performed. COMPARISON: None FINDINGS: The lungs are well expanded and clear. No focal opacities are seen. The pleural spaces are clear. The cardiac silhouette is unremarkable.  There are calcifications of the aortic arch. The visualized osseous structures demonstrate degenerative changes.  There are median sternotomy wires.   There is dextroconvex scoliosis thoracic spine.     No acute cardiopulmonary abnormality. Electronically signed by: Boogie Ely Date:    04/07/2023 Time:    22:18

## 2023-04-08 NOTE — NURSING
Attempt x2 to place oh catheter but were unsuccessful as resistance was met when reaching prostate. MD notified and order to try coude cath given.

## 2023-04-08 NOTE — ASSESSMENT & PLAN NOTE
Aware  nonsurgical candidate  Currently working with [facility] and now [facility] to evaluate candidacy for proton therapy, but likely last resort

## 2023-04-08 NOTE — PROGRESS NOTES
[facility] Medicine  Progress Note    Patient Name: Nimisha Longoria  MRN: [id number]  Patient Class: IP- Inpatient   Admission Date: 4/7/2023  Length of Stay: 1 days  Attending Physician: Corey Rust MD  Primary Care Provider: Zackery Haddad MD        Subjective:     Principal Problem:Closed intertrochanteric fracture of right femur        HPI:  Mr. 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 It is severe. It is associated with right elbow pain and small skin tear to the right elbow and the inability to bear weight to the right leg. He denies fever, chills, N/V/D, preceding dizziness, head trauma, or LOC. He walks with a walker at baseline and will hold himself up with household items when not supported by the walker. He was at the counter and fell down. He tried to get up, but could not bear weight to the right leg. In the ED, he was noted to have shortening and external rotation of the right leg and right hip xray revealed an acute right intertrochanteric femur frx. Right elbow xray without any acute abnormality. Labs reveal a mild normocytic anemia Hgb 11.3/Hct 34.1. S. Hospital medicine is consulted for admission for further work up and treatment.       Overview/Hospital Course:  No notes on file    Interval History:  Patient seen and examined.  Overnight notes reviewed.  Patient was noted to have urinary retention overnight and Simental catheter was placed.  Patient reports right hip pain.  He denies chest pain, shortness a breath, or abdominal pain.  Ortho consulted with plan for surgery today.  PT/OT will be consulted postoperatively.    Review of Systems   Respiratory:  Negative for shortness of breath.    Cardiovascular:  Negative for chest  pain.   Gastrointestinal:  Negative for abdominal pain and nausea.   Genitourinary:  Positive for difficulty urinating.   Musculoskeletal:  Positive for arthralgias and gait problem.   Objective:     Vital Signs (Most Recent):  Temp: 99.2 °F (37.3 °C) (04/08/23 0713)  Pulse: 77 (04/08/23 0713)  Resp: 17 (04/08/23 1143)  BP: (!) 117/58 (04/08/23 0713)  SpO2: 97 % (04/08/23 0713)   Vital Signs (24h Range):  Temp:  [98 °F (36.7 °C)-99.8 °F (37.7 °C)] 99.2 °F (37.3 °C)  Pulse:  [77-83] 77  Resp:  [17-18] 17  SpO2:  [97 %-98 %] 97 %  BP: (117-165)/(58-73) 117/58     Weight: 70.8 kg (156 lb 1.4 oz)  Body mass index is 23.73 kg/m².    Intake/Output Summary (Last 24 hours) at 4/8/2023 1224  Last data filed at 4/8/2023 0630  Gross per 24 hour   Intake --   Output 0 ml   Net 0 ml      Physical Exam  Vitals and nursing note reviewed.   Constitutional:       General: He is not in acute distress.     Appearance: Normal appearance. He is normal weight.   HENT:      Head: Normocephalic and atraumatic.      Mouth/Throat:      Mouth: Mucous membranes are moist.      Pharynx: Oropharynx is clear.   Eyes:      Extraocular Movements: Extraocular movements intact.      Pupils: Pupils are equal, round, and reactive to light.   Cardiovascular:      Rate and Rhythm: Normal rate and regular rhythm.      Pulses: Normal pulses.      Heart sounds: Normal heart sounds.   Pulmonary:      Effort: Pulmonary effort is normal.      Breath sounds: Normal breath sounds.   Abdominal:      General: Abdomen is flat. Bowel sounds are normal.      Palpations: Abdomen is soft.   Musculoskeletal:         General: Normal range of motion.      Cervical back: Normal range of motion and neck supple.      Patient is neurovascularly intact.  Skin:     General: Skin is warm.      Capillary Refill: Capillary refill takes 2 to 3 seconds.   Neurological:      General: No focal deficit present.      Mental Status: He is alert and oriented to person, place, and time.  Mental status is at baseline.   Psychiatric:         Mood and Affect: Mood normal.         Behavior: Behavior normal.       Significant Labs: All pertinent labs within the past 24 hours have been reviewed.  CBC:   Recent Labs   Lab 04/07/23 2205 04/08/23 0437   WBC 10.91 7.31   HGB 11.3* 10.0*   HCT 34.1* 30.2*    175     CMP:   Recent Labs   Lab 04/07/23 2205 04/08/23 0437    142   K 3.5 3.4*    108   CO2 25 25   * 138*   BUN 16 17   CREATININE 0.8 0.7   CALCIUM 9.3 9.0   PROT 6.3 5.6*   ALBUMIN 3.5 3.2*   BILITOT 0.4 0.4   ALKPHOS 67 60   AST 13 12   ALT 8* 8*   ANIONGAP 11 9     Coagulation:   Recent Labs   Lab 04/07/23 2205   INR 1.1       Significant Imaging: I have reviewed all pertinent imaging results/findings within the past 24 hours.      Assessment/Plan:      * Closed intertrochanteric fracture of right femur  Admit to med/tele  Consult orthopedics - called per ED MD  NPO after midnight  Hold asa  - last dose this morning  T&S  Pain control  Will need PT/OT post op  Holding off on pharmacological VTE prophylaxis in the event he has surgical intervention today, adjust as clinically indicated   Bedrest tonight      Coronary artery disease involving native coronary artery of native heart without angina pectoris  Does not currently follow with cardiologist  CABG >20 years ago, no recent stress test  [employer] cardiac risk - average cardiac risk for moderate risk surgery  He is medically optimized for surgery based on current available work up    Advanced care planning/counseling discussion  I spent 5 minutes of face to face discussion regarding advance directives and end of life planning which included patient and family. Patient/Family understands the seriousness of their condition and would like to make their end of life decisions known as follows-     Pt states he is a DNR in the presence of his daughter. Orders placed         Skin tear of right elbow  without complication  Cleaned and dressed with steristrips in the ED  Wound care consulted       Normochromic normocytic anemia  Patient's anemia is currently controlled. Has not received any PRBCs to date..   Current CBC reviewed-   Lab Results   Component Value Date    HGB 10.0 (L) 04/08/2023    HCT 30.2 (L) 04/08/2023     Monitor serial CBC and transfuse if patient becomes hemodynamically unstable, symptomatic or H/H drops below 7/21.         Mixed hyperlipidemia  Continue appropriate home meds      Peripheral polyneuropathy  Chronic, likely led to fall, continue gabapentin       Essential hypertension  Continue appropriate home meds      Chordoma  Aware  nonsurgical candidate  Currently working with [facility] and now [facility] to evaluate candidacy for proton therapy, but likely last resort      VTE Risk Mitigation (From admission, onward)         Ordered     IP VTE HIGH RISK PATIENT  Once         04/07/23 2317     Place sequential compression device  Until discontinued         04/07/23 2317                Discharge Planning   DARYL:      Code Status: DNR   Is the patient medically ready for discharge?:     Reason for patient still in hospital (select all that apply): Patient trending condition, Laboratory test, Treatment, Imaging, Consult recommendations, PT / OT recommendations and Pending disposition                     Farzaneh Muse PA-C  Department of Hospital Medicine   [facility]

## 2023-04-08 NOTE — H&P
[facility] Medicine  History & Physical    Patient Name: Nimisha Longoria  MRN: [id number]  Patient Class: IP- Inpatient  Admission Date: 4/7/2023  Attending Physician:  Dr. Rust  Primary Care Provider: Zackery Haddad MD         Patient information was obtained from patient, relative(s), past medical records and ER records.     Subjective:     Principal Problem:Closed intertrochanteric fracture of right femur    Chief Complaint:   Chief Complaint   Patient presents with    Fall     Pt fell and has pain to R leg cannot place weight on it. R elbow pain as well.         HPI: Mr. 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 It is severe. It is associated with right elbow pain and small skin tear to the right elbow and the inability to bear weight to the right leg. He denies fever, chills, N/V/D, preceding dizziness, head trauma, or LOC. He walks with a walker at baseline and will hold himself up with household items when not supported by the walker. He was at the counter and fell down. He tried to get up, but could not bear weight to the right leg. In the ED, he was noted to have shortening and external rotation of the right leg and right hip xray revealed an acute right intertrochanteric femur frx. Right elbow xray without any acute abnormality. Labs reveal a mild normocytic anemia Hgb 11.3/Hct 34.1. [facility] medicine is consulted for admission for further work up and treatment.       Past Medical History:   Diagnosis Date    Abnormal SPEP 1/17/2021    Anemia     Anemia due to multiple mechanisms 1/17/2021    Anemia, chronic disease 1/17/2021    Coronary artery disease     Hyperlipidemia     Hypertension     Normochromic normocytic anemia 1/17/2021     Sacral chordoma        Past Surgical History:   Procedure Laterality Date    BACK SURGERY      CORONARY ARTERY BYPASS GRAFT      SPINE SURGERY      Dr Villatoro       Review of patient's allergies indicates:  No Known Allergies    No current facility-administered medications on file prior to encounter.     Current Outpatient Medications on File Prior to Encounter   Medication Sig    aspirin (ECOTRIN) 81 MG EC tablet Take 162 mg by mouth once daily.     atorvastatin (LIPITOR) 40 MG tablet Take 1 tablet (40 mg total) by mouth once daily.    betamethasone dipropionate 0.05 % cream Apply topically 2 (two) times daily.    clotrimazole (LOTRIMIN) 1 % cream Apply topically 2 (two) times daily. (Patient not taking: Reported on 4/12/2022)    fenofibrate 160 MG Tab Take 1 tablet (160 mg total) by mouth once daily.    gabapentin (NEURONTIN) 300 MG capsule Start with 1 capsule at bedtime for 1 week then increase to 1 capsule BID. May increase further 1 capsule TID as needed for neuropathy pain    levothyroxine (SYNTHROID) 50 MCG tablet Take 1 tablet (50 mcg total) by mouth before breakfast. First thing in the morning before breakfast. For thyroid.    metoprolol succinate (TOPROL-XL) 25 MG 24 hr tablet Take 1 tablet (25 mg total) by mouth once daily.    tamsulosin (FLOMAX) 0.4 mg Cap Take 1 capsule (0.4 mg total) by mouth every evening.     Family History    None       Tobacco Use    Smoking status: Former     Packs/day: 2.00     Years: 25.00     Pack years: 50.00     Types: Cigarettes    Smokeless tobacco: Never    Tobacco comments:     quit 1985   Substance and Sexual Activity    Alcohol use: No    Drug use: No    Sexual activity: Not on file     Review of Systems   Constitutional:  Negative for chills, diaphoresis, fatigue and fever.   HENT:  Negative for congestion, ear pain, sore throat and trouble swallowing.    Eyes:  Negative for pain, discharge and visual disturbance.   Respiratory:  Negative for cough, chest  tightness, shortness of breath and wheezing.    Cardiovascular:  Negative for chest pain, palpitations and leg swelling.   Gastrointestinal:  Negative for abdominal distention, abdominal pain, blood in stool, constipation, diarrhea, nausea and vomiting.   Endocrine: Negative for polydipsia, polyphagia and polyuria.   Genitourinary:  Negative for dysuria, flank pain, frequency and urgency.   Musculoskeletal:  Positive for arthralgias and gait problem. Negative for back pain, joint swelling, neck pain and neck stiffness.   Skin:  Positive for wound. Negative for rash.   Allergic/Immunologic: Negative for immunocompromised state.   Neurological:  Negative for dizziness, syncope, speech difficulty, weakness, light-headedness, numbness and headaches.   Hematological:  Negative for adenopathy.   Psychiatric/Behavioral:  Negative for confusion and suicidal ideas. The patient is not nervous/anxious.    All other systems reviewed and are negative.  Objective:     Vital Signs (Most Recent):  Temp: 98 °F (36.7 °C) (04/07/23 2048)  Pulse: 80 (04/08/23 0000)  Resp: 18 (04/08/23 0000)  BP: 131/60 (04/08/23 0000)  SpO2: 98 % (04/08/23 0000) Vital Signs (24h Range):  Temp:  [98 °F (36.7 °C)] 98 °F (36.7 °C)  Pulse:  [77-83] 80  Resp:  [18] 18  SpO2:  [97 %-98 %] 98 %  BP: (131-165)/(60-73) 131/60     Weight: 70.8 kg (156 lb 1.4 oz)  Body mass index is 23.73 kg/m².    Physical Exam  Vitals and nursing note reviewed.   Constitutional:       Appearance: He is well-developed.   HENT:      Head: Normocephalic and atraumatic.   Eyes:      Conjunctiva/sclera: Conjunctivae normal.      Pupils: Pupils are equal, round, and reactive to light.   Cardiovascular:      Rate and Rhythm: Normal rate and regular rhythm.      Pulses: Normal pulses.   Pulmonary:      Effort: Pulmonary effort is normal.      Breath sounds: Normal breath sounds.   Abdominal:      General: Bowel sounds are normal.      Palpations: Abdomen is soft.   Musculoskeletal:          General: Deformity present.      Cervical back: Normal range of motion and neck supple.      Comments: Right leg shortened with external rotation   Skin:     General: Skin is warm and dry.      Capillary Refill: Capillary refill takes less than 2 seconds.   Neurological:      Mental Status: He is alert and oriented to person, place, and time. Mental status is at baseline.   Psychiatric:         Behavior: Behavior normal.         Thought Content: Thought content normal.         Judgment: Judgment normal.         CRANIAL NERVES     CN III, IV, VI   Pupils are equal, round, and reactive to light.     Significant Labs: All pertinent labs within the past 24 hours have been reviewed.  CBC:   Recent Labs   Lab 04/07/23  2205   WBC 10.91   HGB 11.3*   HCT 34.1*        CMP:   Recent Labs   Lab 04/07/23  2205      K 3.5      CO2 25   *   BUN 16   CREATININE 0.8   CALCIUM 9.3   PROT 6.3   ALBUMIN 3.5   BILITOT 0.4   ALKPHOS 67   AST 13   ALT 8*   ANIONGAP 11       Significant Imaging: I have reviewed all pertinent imaging results/findings within the past 24 hours.  EKG: I have reviewed all pertinent results/findings within the past 24 hours and my personal findings are: NSR with occasional PVCs, no acute ischemic changes     X-Ray Elbow Complete Right    Result Date: 4/7/2023  EXAMINATION: XR ELBOW COMPLETE 3 VIEW RIGHT CLINICAL HISTORY: . Unspecified injury of right elbow, initial encounter TECHNIQUE: AP, lateral, and oblique views of the right elbow were performed. COMPARISON: None FINDINGS: There is no acute fracture or dislocation.  Alignment is normal.  Joint spaces are preserved.  There is no elbow joint effusion.     No acute osseous abnormality. Electronically signed by: Boogie Ely Date:    04/07/2023 Time:    21:39    X-Ray Hip 2 or 3 views Right (with Pelvis when performed)    Result Date: 4/7/2023  EXAMINATION: XR HIP WITH PELVIS WHEN PERFORMED, 2 OR 3  VIEWS RIGHT CLINICAL HISTORY:  Unspecified injury of right hip, initial encounter TECHNIQUE: AP view of the pelvis and frog leg lateral view of the right hip were performed. COMPARISON: 07/20/2015. FINDINGS: There is osteopenia.  There is a medially angulated intertrochanteric fracture of the right proximal femur.  No additional fractures are seen.  There are vascular calcifications.  There are degenerative changes.     Right femoral intertrochanteric fracture. Electronically signed by: Boogie Ely Date:    04/07/2023 Time:    21:41    X-Ray Femur Ap/Lat Right    Result Date: 4/7/2023  EXAMINATION: XR FEMUR 2 VIEW RIGHT CLINICAL HISTORY: Encounter for other preprocedural examination TECHNIQUE: AP and lateral views of the right femur were performed. COMPARISON: Radiographs from earlier the same date. FINDINGS: There is osteopenia.  There is an intertrochanteric fracture of the right proximal femur.  Alignment is unchanged from prior.  There are degenerative changes of the knee.  There is a small suprapatellar joint effusion.  There are vascular     Right femoral intertrochanteric fracture. Electronically signed by: Boogie Ely Date:    04/07/2023 Time:    23:09    X-Ray Chest AP Portable    Result Date: 4/7/2023  EXAMINATION: XR CHEST AP PORTABLE CLINICAL HISTORY: Encounter for other preprocedural examination TECHNIQUE: Single frontal view of the chest was performed. COMPARISON: None FINDINGS: The lungs are well expanded and clear. No focal opacities are seen. The pleural spaces are clear. The cardiac silhouette is unremarkable.  There are calcifications of the aortic arch. The visualized osseous structures demonstrate degenerative changes.  There are median sternotomy wires.  There is dextroconvex scoliosis thoracic spine.     No acute cardiopulmonary abnormality. Electronically signed by: Boogie Ely Date:    04/07/2023 Time:    22:18       Assessment/Plan:     * Closed intertrochanteric fracture of right femur  Admit to med/tele  Consult  orthopedics - called per ED MD  NPO after midnight  Hold asa  - last dose this morning  T&S  Pain control  Will need PT/OT post op  Holding off on pharmacological VTE prophylaxis in the event he has surgical intervention in the morning, adjust as clinically indicated   Bedrest tonight      Skin tear of right elbow without complication  Cleaned and dressed with steristrips in the ED  Will consult wound care in the AM       Normochromic normocytic anemia  T&S   Mild  Trend CBC      Mixed hyperlipidemia  Continue appropriate home meds      Peripheral polyneuropathy  Chronic, likely led to fall, continue gabapentin       Coronary artery disease involving native coronary artery of native heart without angina pectoris  Does not currently follow with cardiologist  CABG >20 years ago, no recent stress test  [employer] cardiac risk - average cardiac risk for moderate risk surgery  He is medically optimized for surgery based on current available work up    Essential hypertension  Continue appropriate home meds      Chordoma  Aware  nonsurgical candidate  Currently working with [facility] and now [facility] to evaluate candidacy for proton therapy, but likely last resort    Advanced Care Planning  I spent 5 minutes of face to face discussion regarding advance directives and end of life planning which included patient and family. Patient/Family understands the seriousness of their condition and would like to make their end of life decisions known as follows-     Pt states he is a DNR in the presence of his daughter. Orders placed   VTE Risk Mitigation (From admission, onward)           Ordered     IP VTE HIGH RISK PATIENT  Once         04/07/23 2317     Place sequential compression device  Until discontinued         04/07/23 2317                               Rani Dueñas NP  Department of Hospital Medicine  [facility]

## 2023-04-08 NOTE — ED PROVIDER NOTES
Encounter Date: 4/7/2023       History     Chief Complaint   Patient presents with    Fall     Pt fell and has pain to R leg cannot place weight on it. R elbow pain as well.      HPI  83 y.o.   S/p mechanical fall,has h/o neuropathy and fell a few hrs ago, no HS, no HA, no LOC, no neck nor back pain  Co pain R hip and unable to bear weight  Also injury R elbow  Immunizations are up to date.      Review of patient's allergies indicates:  No Known Allergies  Past Medical History:   Diagnosis Date    Abnormal SPEP 1/17/2021    Anemia     Anemia due to multiple mechanisms 1/17/2021    Anemia, chronic disease 1/17/2021    Coronary artery disease     Hyperlipidemia     Hypertension     Normochromic normocytic anemia 1/17/2021    Sacral chordoma      Past Surgical History:   Procedure Laterality Date    BACK SURGERY      CORONARY ARTERY BYPASS GRAFT      SPINE SURGERY      Dr Villatoro     No family history on file.  Social History     Tobacco Use    Smoking status: Former     Packs/day: 2.00     Years: 25.00     Pack years: 50.00     Types: Cigarettes    Smokeless tobacco: Never    Tobacco comments:     quit 1985   Substance Use Topics    Alcohol use: No    Drug use: No     Review of Systems  All systems were reviewed/examined and were negative except as noted in the HPI.    Physical Exam     Initial Vitals [04/07/23 2048]   BP Pulse Resp Temp SpO2   (!) 165/73 77 18 98 °F (36.7 °C) 97 %      MAP       --         Physical Exam    General: the patient is awake, alert, and in no apparent distress.  Head: normocephalic and atraumatic, sclera are clear  Neck: nt  Chest: clear to auscultation bilaterally, no respiratory distress  Heart: regular rate and rhythm  ABD soft, nontender, nondistended, no peritoneal signs  Back nt in the midline  Extremities: warm and well perfused     R hip is held with slight external rotation, not very tender, femur nt and w/o obvious deformity; knee nt and able to ROM; R lower leg wnl and nvi      R elbow has superficial well approx skin tear about 2cm and good rom  Skin: warm and dry  Psych conversant  Neuro: awake, alert, moving all extremities    ED Course   Procedures  Labs Reviewed   CBC W/ AUTO DIFFERENTIAL - Abnormal; Notable for the following components:       Result Value    RBC 3.75 (*)     Hemoglobin 11.3 (*)     Hematocrit 34.1 (*)     Gran # (ANC) 9.6 (*)     Immature Grans (Abs) 0.05 (*)     Lymph # 0.6 (*)     Gran % 88.3 (*)     Lymph % 5.9 (*)     All other components within normal limits   COMPREHENSIVE METABOLIC PANEL - Abnormal; Notable for the following components:    Glucose 177 (*)     ALT 8 (*)     All other components within normal limits   PROTIME-INR   TYPE & SCREEN          Imaging Results              X-Ray Femur Ap/Lat Right (Final result)  Result time 04/07/23 23:09:18      Final result by Boogie Ely DO (04/07/23 23:09:18)                   Impression:      Right femoral intertrochanteric fracture.      Electronically signed by: Boogie Ely  Date:    04/07/2023  Time:    23:09               Narrative:    EXAMINATION:  XR FEMUR 2 VIEW RIGHT    CLINICAL HISTORY:  Encounter for other preprocedural examination    TECHNIQUE:  AP and lateral views of the right femur were performed.    COMPARISON:  Radiographs from earlier the same date.    FINDINGS:  There is osteopenia.  There is an intertrochanteric fracture of the right proximal femur.  Alignment is unchanged from prior.  There are degenerative changes of the knee.  There is a small suprapatellar joint effusion.  There are vascular                                       X-Ray Chest AP Portable (Final result)  Result time 04/07/23 22:18:41      Final result by Boogie Ely DO (04/07/23 22:18:41)                   Impression:      No acute cardiopulmonary abnormality.      Electronically signed by: Boogie Ely  Date:    04/07/2023  Time:    22:18               Narrative:    EXAMINATION:  XR CHEST AP  PORTABLE    CLINICAL HISTORY:  Encounter for other preprocedural examination    TECHNIQUE:  Single frontal view of the chest was performed.    COMPARISON:  None    FINDINGS:  The lungs are well expanded and clear. No focal opacities are seen. The pleural spaces are clear.    The cardiac silhouette is unremarkable.  There are calcifications of the aortic arch.    The visualized osseous structures demonstrate degenerative changes.  There are median sternotomy wires.  There is dextroconvex scoliosis thoracic spine.                                       X-Ray Hip 2 or 3 views Right (with Pelvis when performed) (Final result)  Result time 04/07/23 21:41:07      Final result by Boogie Ely DO (04/07/23 21:41:07)                   Impression:      Right femoral intertrochanteric fracture.      Electronically signed by: Boogie Ely  Date:    04/07/2023  Time:    21:41               Narrative:    EXAMINATION:  XR HIP WITH PELVIS WHEN PERFORMED, 2 OR 3  VIEWS RIGHT    CLINICAL HISTORY:  Unspecified injury of right hip, initial encounter    TECHNIQUE:  AP view of the pelvis and frog leg lateral view of the right hip were performed.    COMPARISON:  07/20/2015.    FINDINGS:  There is osteopenia.  There is a medially angulated intertrochanteric fracture of the right proximal femur.  No additional fractures are seen.  There are vascular calcifications.  There are degenerative changes.                                       X-Ray Elbow Complete Right (Final result)  Result time 04/07/23 21:39:05      Final result by Boogie Ely DO (04/07/23 21:39:05)                   Impression:      No acute osseous abnormality.      Electronically signed by: Boogie Ely  Date:    04/07/2023  Time:    21:39               Narrative:    EXAMINATION:  XR ELBOW COMPLETE 3 VIEW RIGHT    CLINICAL HISTORY:  . Unspecified injury of right elbow, initial encounter    TECHNIQUE:  AP, lateral, and oblique views of the right elbow were  performed.    COMPARISON:  None    FINDINGS:  There is no acute fracture or dislocation.  Alignment is normal.  Joint spaces are preserved.  There is no elbow joint effusion.                                       Medications   atorvastatin tablet 40 mg (has no administration in time range)   fenofibrate tablet 160 mg (has no administration in time range)   gabapentin capsule 300 mg (has no administration in time range)   levothyroxine tablet 50 mcg (50 mcg Oral Not Given 4/8/23 0600)   metoprolol succinate (TOPROL-XL) 24 hr tablet 25 mg (has no administration in time range)   tamsulosin 24 hr capsule 0.4 mg (has no administration in time range)   sodium chloride 0.9% flush 10 mL (has no administration in time range)   melatonin tablet 6 mg (has no administration in time range)   ondansetron injection 4 mg (has no administration in time range)   polyethylene glycol packet 17 g (has no administration in time range)   senna-docusate 8.6-50 mg per tablet 1 tablet (1 tablet Oral Given 4/8/23 0001)   acetaminophen tablet 650 mg (has no administration in time range)   aluminum-magnesium hydroxide-simethicone 200-200-20 mg/5 mL suspension 30 mL (has no administration in time range)   naloxone 0.4 mg/mL injection 0.02 mg (has no administration in time range)   potassium bicarbonate disintegrating tablet 50 mEq (has no administration in time range)   potassium bicarbonate disintegrating tablet 35 mEq (has no administration in time range)   potassium bicarbonate disintegrating tablet 60 mEq (has no administration in time range)   magnesium oxide tablet 800 mg (has no administration in time range)   magnesium oxide tablet 800 mg (has no administration in time range)   glucose chewable tablet 16 g (has no administration in time range)   glucose chewable tablet 24 g (has no administration in time range)   glucagon (human recombinant) injection 1 mg (has no administration in time range)   HYDROcodone-acetaminophen 5-325 mg per  tablet 1 tablet (has no administration in time range)   morphine injection 2 mg (has no administration in time range)   dextrose 10% bolus 125 mL 125 mL (has no administration in time range)   dextrose 10% bolus 250 mL 250 mL (has no administration in time range)                    Medical Decision Making:    This is an emergent evaluation of a patient presenting to the ED.  Nursing notes were reviewed.  I personally reviewed, read, and interpreted the ECG and any monitoring strips.  ECG: normal EKG, normal sinus rhythm, unchanged from previous tracings Compared with prior if available.  Read and interpreted by me independently.      I reviewed radiology images personally along with interpretations.  Intertroch fx on R  Elbow neg  I personally reviewed and interpreted the laboratory results.  Non critical  Communicated with another physician regarding patient's care: ortho, hosp medicine  I decided to obtain and review old medical records, which showed: well care    Stable in ED    Andreas Stern MD, ALEXX              Clinical Impression:   Final diagnoses:  [S59.901A] Elbow injury, right, initial encounter  [S79.911A] Hip injury, right, initial encounter  [Z01.818] Pre-op evaluation  [S72.001A] Closed fracture of right hip, initial encounter (Primary)        ED Disposition Condition    Admit Stable             Andreas Stern MD, ALE[facility], [facility]  Department of Emergency Medicine       Zackery Stern MD  04/08/23 0611

## 2023-04-08 NOTE — HPI
Mr. 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 It is severe. It is associated with right elbow pain and small skin tear to the right elbow and the inability to bear weight to the right leg. He denies fever, chills, N/V/D, preceding dizziness, head trauma, or LOC. He walks with a walker at baseline and will hold himself up with household items when not supported by the walker. He was at the counter and fell down. He tried to get up, but could not bear weight to the right leg. In the ED, he was noted to have shortening and external rotation of the right leg and right hip xray revealed an acute right intertrochanteric femur frx. Right elbow xray without any acute abnormality. Labs reveal a mild normocytic anemia Hgb 11.3/Hct 34.1. [facility]. [facility] medicine is consulted for admission for further work up and treatment.

## 2023-04-08 NOTE — PLAN OF CARE
Plan of care reviewed with pt. Pt verbalized understanding. Patient is alert and oriented x 4, able to make needs known. Continuous cardiac monitoring in place as ordered. A-febrile throughout the shift. Meds given per MAR. Repositions self independently. No complaints of pain or discomfort. Purposeful hourly/q2hr rounding done during shift to promote patient safety. NAD noted. Safety maintained with side rails up x3, bed wheels locked, bed in lowest position, call light in reach. Patient educated to call for assistance with ambulation if needed, verbalized understanding. Pt remains free of falls. No further needs expressed at this time. Will continue to monitor.     Problem: Adult Inpatient Plan of Care  Goal: Plan of Care Review  Outcome: Ongoing, Progressing  Goal: Patient-Specific Goal (Individualized)  Outcome: Ongoing, Progressing  Goal: Absence of Hospital-Acquired Illness or Injury  Outcome: Ongoing, Progressing  Goal: Optimal Comfort and Wellbeing  Outcome: Ongoing, Progressing  Goal: Readiness for Transition of Care  Outcome: Ongoing, Progressing     Problem: Impaired Wound Healing  Goal: Optimal Wound Healing  Outcome: Ongoing, Progressing

## 2023-04-08 NOTE — NURSING
Simental cath size 16 ( coude cath) inserted using sterile tech, pt tolerated it well. Noted yellow-colored urine.

## 2023-04-08 NOTE — TRANSFER OF CARE
"Anesthesia Transfer of Care Note    Patient: Nimisha Longoria    Procedure(s) Performed: Procedure(s) (LRB):  INSERTION, INTRAMEDULLARY CHUCKY, FEMUR, synthes, hana table, 1st assist (Right)    Patient location: PACU    Anesthesia Type: general    Transport from OR: Transported from OR on 6-10 L/min O2 by face mask with adequate spontaneous ventilation    Post pain: adequate analgesia    Post assessment: no apparent anesthetic complications    Post vital signs: stable    Level of consciousness: sedated    Nausea/Vomiting: no nausea/vomiting    Complications: none    Transfer of care protocol was followed      Last vitals:   Visit Vitals  /60   Pulse 68   Temp 36.5 °C (97.7 °F)   Resp 16   Ht 5' 8" (1.727 m)   Wt 70.8 kg (156 lb 1.4 oz)   SpO2 99%   BMI 23.73 kg/m²     "

## 2023-04-08 NOTE — CARE UPDATE
04/08/23 1716   Respiratory Evaluation   $ Care Plan Tech Time 15 min   Evaluation For New Orders   Admitting Diagnosis Closed interrochanteric fracture of rt femur   Cardiac Diagnosis CAD, Hypertension   Current Surgeries femur juan insertion   Home Oxygen   Has Home Oxygen? No   Home Aerosol, MDI, DPI, and Other Treatments/Therapies   Home Respiratory Therapy Per Patient/Review of Chart No   Oxygen Care Plan   Oxygen Care Plan Per Protocol   SPO2 Goal (%) MD order   Rationale Post-op recovery   Bronchodilator Care Plan   Bronchodilator Care Plan N/A   Rationale No Rationale found   Atelectasis Care Plan   Atelectasis Care Plan Incentive Spiromentry   Frequency TID   I.S. Goal (ml) 1800 ml   I.S. Minimum (ml) 900 ml   Rationale Other  (post op)   Airway Clearance Care Plan   Rationale No rationale found

## 2023-04-08 NOTE — NURSING
Pt arrived to room 324 via stretcher by nurse assistant. Belongings and family at bedside. VSS. NAD noted. Oriented pt to room. Call light with in reach.

## 2023-04-09 LAB
ALBUMIN SERPL BCP-MCNC: 2.6 G/DL (ref 3.5–5.2)
ALP SERPL-CCNC: 48 U/L (ref 55–135)
ALT SERPL W/O P-5'-P-CCNC: 8 U/L (ref 10–44)
ANION GAP SERPL CALC-SCNC: 8 MMOL/L (ref 8–16)
AST SERPL-CCNC: 11 U/L (ref 10–40)
BASOPHILS # BLD AUTO: 0.01 K/UL (ref 0–0.2)
BASOPHILS NFR BLD: 0.1 % (ref 0–1.9)
BILIRUB SERPL-MCNC: 0.6 MG/DL (ref 0.1–1)
BNP SERPL-MCNC: 76 PG/ML (ref 0–99)
BUN SERPL-MCNC: 15 MG/DL (ref 8–23)
CALCIUM SERPL-MCNC: 8.2 MG/DL (ref 8.7–10.5)
CHLORIDE SERPL-SCNC: 108 MMOL/L (ref 95–110)
CO2 SERPL-SCNC: 24 MMOL/L (ref 23–29)
CREAT SERPL-MCNC: 0.8 MG/DL (ref 0.5–1.4)
DIFFERENTIAL METHOD: ABNORMAL
EOSINOPHIL # BLD AUTO: 0 K/UL (ref 0–0.5)
EOSINOPHIL NFR BLD: 0.1 % (ref 0–8)
ERYTHROCYTE [DISTWIDTH] IN BLOOD BY AUTOMATED COUNT: 13.2 % (ref 11.5–14.5)
EST. GFR  (NO RACE VARIABLE): >60 ML/MIN/1.73 M^2
GLUCOSE SERPL-MCNC: 117 MG/DL (ref 70–110)
HCT VFR BLD AUTO: 24.8 % (ref 40–54)
HGB BLD-MCNC: 8.3 G/DL (ref 14–18)
IMM GRANULOCYTES # BLD AUTO: 0.02 K/UL (ref 0–0.04)
IMM GRANULOCYTES NFR BLD AUTO: 0.3 % (ref 0–0.5)
LYMPHOCYTES # BLD AUTO: 0.7 K/UL (ref 1–4.8)
LYMPHOCYTES NFR BLD: 10.4 % (ref 18–48)
MAGNESIUM SERPL-MCNC: 1.8 MG/DL (ref 1.6–2.6)
MCH RBC QN AUTO: 30.5 PG (ref 27–31)
MCHC RBC AUTO-ENTMCNC: 33.5 G/DL (ref 32–36)
MCV RBC AUTO: 91 FL (ref 82–98)
MONOCYTES # BLD AUTO: 0.7 K/UL (ref 0.3–1)
MONOCYTES NFR BLD: 9.7 % (ref 4–15)
NEUTROPHILS # BLD AUTO: 5.3 K/UL (ref 1.8–7.7)
NEUTROPHILS NFR BLD: 79.4 % (ref 38–73)
NRBC BLD-RTO: 0 /100 WBC
PHOSPHATE SERPL-MCNC: 3.3 MG/DL (ref 2.7–4.5)
PLATELET # BLD AUTO: 137 K/UL (ref 150–450)
PMV BLD AUTO: 11.2 FL (ref 9.2–12.9)
POTASSIUM SERPL-SCNC: 3.8 MMOL/L (ref 3.5–5.1)
PROT SERPL-MCNC: 4.9 G/DL (ref 6–8.4)
RBC # BLD AUTO: 2.72 M/UL (ref 4.6–6.2)
SODIUM SERPL-SCNC: 140 MMOL/L (ref 136–145)
WBC # BLD AUTO: 6.73 K/UL (ref 3.9–12.7)

## 2023-04-09 PROCEDURE — 25000003 PHARM REV CODE 250: Performed by: ORTHOPAEDIC SURGERY

## 2023-04-09 PROCEDURE — 51798 US URINE CAPACITY MEASURE: CPT

## 2023-04-09 PROCEDURE — 97162 PT EVAL MOD COMPLEX 30 MIN: CPT

## 2023-04-09 PROCEDURE — 83880 ASSAY OF NATRIURETIC PEPTIDE: CPT

## 2023-04-09 PROCEDURE — 85025 COMPLETE CBC W/AUTO DIFF WBC: CPT | Performed by: ORTHOPAEDIC SURGERY

## 2023-04-09 PROCEDURE — 25000003 PHARM REV CODE 250: Performed by: STUDENT IN AN ORGANIZED HEALTH CARE EDUCATION/TRAINING PROGRAM

## 2023-04-09 PROCEDURE — 63600175 PHARM REV CODE 636 W HCPCS: Performed by: ORTHOPAEDIC SURGERY

## 2023-04-09 PROCEDURE — 36415 COLL VENOUS BLD VENIPUNCTURE: CPT | Performed by: ORTHOPAEDIC SURGERY

## 2023-04-09 PROCEDURE — 36415 COLL VENOUS BLD VENIPUNCTURE: CPT

## 2023-04-09 PROCEDURE — 84100 ASSAY OF PHOSPHORUS: CPT | Performed by: ORTHOPAEDIC SURGERY

## 2023-04-09 PROCEDURE — 83735 ASSAY OF MAGNESIUM: CPT | Performed by: ORTHOPAEDIC SURGERY

## 2023-04-09 PROCEDURE — 94799 UNLISTED PULMONARY SVC/PX: CPT

## 2023-04-09 PROCEDURE — 25000003 PHARM REV CODE 250

## 2023-04-09 PROCEDURE — 25000242 PHARM REV CODE 250 ALT 637 W/ HCPCS: Performed by: ORTHOPAEDIC SURGERY

## 2023-04-09 PROCEDURE — 12000002 HC ACUTE/MED SURGE SEMI-PRIVATE ROOM

## 2023-04-09 PROCEDURE — 80053 COMPREHEN METABOLIC PANEL: CPT | Performed by: ORTHOPAEDIC SURGERY

## 2023-04-09 PROCEDURE — 94761 N-INVAS EAR/PLS OXIMETRY MLT: CPT

## 2023-04-09 PROCEDURE — 99900035 HC TECH TIME PER 15 MIN (STAT)

## 2023-04-09 RX ORDER — MUPIROCIN 20 MG/G
OINTMENT TOPICAL 2 TIMES DAILY
Status: DISCONTINUED | OUTPATIENT
Start: 2023-04-09 | End: 2023-04-12 | Stop reason: HOSPADM

## 2023-04-09 RX ADMIN — ASPIRIN 325 MG: 325 TABLET ORAL at 09:04

## 2023-04-09 RX ADMIN — CEFAZOLIN SODIUM 2 G: 2 SOLUTION INTRAVENOUS at 02:04

## 2023-04-09 RX ADMIN — DOCUSATE SODIUM AND SENNOSIDES 1 TABLET: 8.6; 5 TABLET, FILM COATED ORAL at 09:04

## 2023-04-09 RX ADMIN — GABAPENTIN 300 MG: 300 CAPSULE ORAL at 09:04

## 2023-04-09 RX ADMIN — FENOFIBRATE 160 MG: 160 TABLET ORAL at 09:04

## 2023-04-09 RX ADMIN — GABAPENTIN 300 MG: 300 CAPSULE ORAL at 04:04

## 2023-04-09 RX ADMIN — ATORVASTATIN CALCIUM 40 MG: 40 TABLET, FILM COATED ORAL at 09:04

## 2023-04-09 RX ADMIN — MUPIROCIN: 20 OINTMENT TOPICAL at 01:04

## 2023-04-09 RX ADMIN — SODIUM CHLORIDE: 9 INJECTION, SOLUTION INTRAVENOUS at 12:04

## 2023-04-09 RX ADMIN — SODIUM CHLORIDE 500 ML: 9 INJECTION, SOLUTION INTRAVENOUS at 01:04

## 2023-04-09 RX ADMIN — CEFAZOLIN SODIUM 2 G: 2 SOLUTION INTRAVENOUS at 09:04

## 2023-04-09 RX ADMIN — ACETAMINOPHEN 1000 MG: 10 INJECTION INTRAVENOUS at 05:04

## 2023-04-09 RX ADMIN — ACETAMINOPHEN 1000 MG: 10 INJECTION INTRAVENOUS at 03:04

## 2023-04-09 RX ADMIN — MUPIROCIN: 20 OINTMENT TOPICAL at 09:04

## 2023-04-09 RX ADMIN — TAMSULOSIN HYDROCHLORIDE 0.4 MG: 0.4 CAPSULE ORAL at 09:04

## 2023-04-09 RX ADMIN — SODIUM CHLORIDE: 9 INJECTION, SOLUTION INTRAVENOUS at 10:04

## 2023-04-09 NOTE — SUBJECTIVE & OBJECTIVE
Interval History: Notes reviewed, no acute events overnight. Patient seen this morning resting in bed with no acute distress. POD 1 of right hip repair. Reports pain well controlled at this time. Denies anty chest pain, SOB, N/V, or fevers at this time. Awaiting PT/OT post op evaluation. Likely patient will need SNF. Hypotensive this morning. Held morning BP medication. Nursing staff reports some urinary retention. Maintain oh cath and decrease IV fluid rate. BNP normal. Discharge disposition pending PT assessment.     Review of Systems   Constitutional:  Negative for fatigue and fever.   Respiratory:  Negative for shortness of breath.    Cardiovascular:  Negative for chest pain.   Gastrointestinal:  Negative for abdominal distention and abdominal pain.   Genitourinary:  Positive for difficulty urinating. Negative for frequency.   Musculoskeletal:  Negative for arthralgias and myalgias.   Neurological:  Negative for weakness, light-headedness and headaches.   Psychiatric/Behavioral:  Negative for behavioral problems and confusion.    All other systems reviewed and are negative.  Objective:     Vital Signs (Most Recent):  Temp: 97.5 °F (36.4 °C) (04/09/23 0733)  Pulse: (!) 55 (04/09/23 0826)  Resp: 18 (04/09/23 0826)  BP: (!) 99/52 (04/09/23 0908)  SpO2: 99 % (04/09/23 0826)   Vital Signs (24h Range):  Temp:  [97.5 °F (36.4 °C)-99 °F (37.2 °C)] 97.5 °F (36.4 °C)  Pulse:  [54-78] 55  Resp:  [12-18] 18  SpO2:  [97 %-100 %] 99 %  BP: ()/(51-77) 99/52     Weight: 70.8 kg (156 lb 1.4 oz)  Body mass index is 23.73 kg/m².    Intake/Output Summary (Last 24 hours) at 4/9/2023 1049  Last data filed at 4/9/2023 0859  Gross per 24 hour   Intake 3885.36 ml   Output 1925 ml   Net 1960.36 ml      Physical Exam  Vitals and nursing note reviewed.   Constitutional:       General: He is not in acute distress.     Appearance: He is not ill-appearing.   HENT:      Head: Normocephalic and atraumatic.   Cardiovascular:      Rate  and Rhythm: Normal rate and regular rhythm.      Pulses: Normal pulses.      Heart sounds: No murmur heard.    No gallop.   Pulmonary:      Effort: Pulmonary effort is normal. No respiratory distress.      Breath sounds: No wheezing or rales.   Abdominal:      General: Abdomen is flat. There is no distension.      Palpations: Abdomen is soft.      Tenderness: There is no abdominal tenderness.   Genitourinary:     Comments: Simental in place with minimal clear urine output   Musculoskeletal:         General: No swelling or tenderness.      Comments: Right hip dressings CDI   Skin:     General: Skin is warm.      Coloration: Skin is not jaundiced.      Findings: No bruising.   Neurological:      General: No focal deficit present.      Mental Status: He is alert. Mental status is at baseline.   Psychiatric:         Mood and Affect: Mood normal.       Significant Labs: All pertinent labs within the past 24 hours have been reviewed.  CBC:   Recent Labs   Lab 04/07/23 2205 04/08/23 0437 04/09/23 0431   WBC 10.91 7.31 6.73   HGB 11.3* 10.0* 8.3*   HCT 34.1* 30.2* 24.8*    175 137*     CMP:   Recent Labs   Lab 04/07/23 2205 04/08/23 0437 04/09/23  0431    142 140   K 3.5 3.4* 3.8    108 108   CO2 25 25 24   * 138* 117*   BUN 16 17 15   CREATININE 0.8 0.7 0.8   CALCIUM 9.3 9.0 8.2*   PROT 6.3 5.6* 4.9*   ALBUMIN 3.5 3.2* 2.6*   BILITOT 0.4 0.4 0.6   ALKPHOS 67 60 48*   AST 13 12 11   ALT 8* 8* 8*   ANIONGAP 11 9 8       Significant Imaging: I have reviewed all pertinent imaging results/findings within the past 24 hours.

## 2023-04-09 NOTE — PROGRESS NOTES
[facility] Hospital Medicine  Progress Note    Patient Name: Nimisha Longoria  MRN: [id number]  Patient Class: IP- Inpatient   Admission Date: 4/7/2023  Length of Stay: 2 days  Attending Physician: Corey Rust MD  Primary Care Provider: Zackery Haddad MD        Subjective:     Principal Problem:Closed intertrochanteric fracture of right femur        HPI:  Mr. 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 It is severe. It is associated with right elbow pain and small skin tear to the right elbow and the inability to bear weight to the right leg. He denies fever, chills, N/V/D, preceding dizziness, head trauma, or LOC. He walks with a walker at baseline and will hold himself up with household items when not supported by the walker. He was at the counter and fell down. He tried to get up, but could not bear weight to the right leg. In the ED, he was noted to have shortening and external rotation of the right leg and right hip xray revealed an acute right intertrochanteric femur frx. Right elbow xray without any acute abnormality. Labs reveal a mild normocytic anemia Hgb 11.3/Hct 34.1. S. Hospital medicine is consulted for admission for further work up and treatment.       Overview/Hospital Course:  The patient was admitted to the hospital with right hip fracture.  Patient was monitored closely throughout his hospital stay.  Ortho was consulted on admission with plan for surgery.  Patient was found to have urinary retention and Simental catheter was placed.      Interval History: Notes reviewed, no acute events overnight. Patient seen this morning resting in bed with no acute distress. POD 1 of right hip repair. Reports pain well controlled at this time. Denies rahul  chest pain, SOB, N/V, or fevers at this time. Awaiting PT/OT post op evaluation. Likely patient will need SNF. Hypotensive this morning. Held morning BP medication. Nursing staff reports some urinary retention. Maintain oh cath and decrease IV fluid rate. BNP normal. Discharge disposition pending PT assessment.     Review of Systems   Constitutional:  Negative for fatigue and fever.   Respiratory:  Negative for shortness of breath.    Cardiovascular:  Negative for chest pain.   Gastrointestinal:  Negative for abdominal distention and abdominal pain.   Genitourinary:  Positive for difficulty urinating. Negative for frequency.   Musculoskeletal:  Negative for arthralgias and myalgias.   Neurological:  Negative for weakness, light-headedness and headaches.   Psychiatric/Behavioral:  Negative for behavioral problems and confusion.    All other systems reviewed and are negative.  Objective:     Vital Signs (Most Recent):  Temp: 97.5 °F (36.4 °C) (04/09/23 0733)  Pulse: (!) 55 (04/09/23 0826)  Resp: 18 (04/09/23 0826)  BP: (!) 99/52 (04/09/23 0908)  SpO2: 99 % (04/09/23 0826)   Vital Signs (24h Range):  Temp:  [97.5 °F (36.4 °C)-99 °F (37.2 °C)] 97.5 °F (36.4 °C)  Pulse:  [54-78] 55  Resp:  [12-18] 18  SpO2:  [97 %-100 %] 99 %  BP: ()/(51-77) 99/52     Weight: 70.8 kg (156 lb 1.4 oz)  Body mass index is 23.73 kg/m².    Intake/Output Summary (Last 24 hours) at 4/9/2023 1049  Last data filed at 4/9/2023 0859  Gross per 24 hour   Intake 3885.36 ml   Output 1925 ml   Net 1960.36 ml      Physical Exam  Vitals and nursing note reviewed.   Constitutional:       General: He is not in acute distress.     Appearance: He is not ill-appearing.   HENT:      Head: Normocephalic and atraumatic.   Cardiovascular:      Rate and Rhythm: Normal rate and regular rhythm.      Pulses: Normal pulses.      Heart sounds: No murmur heard.    No gallop.   Pulmonary:      Effort: Pulmonary effort is normal. No respiratory distress.       Breath sounds: No wheezing or rales.   Abdominal:      General: Abdomen is flat. There is no distension.      Palpations: Abdomen is soft.      Tenderness: There is no abdominal tenderness.   Genitourinary:     Comments: Simental in place with minimal clear urine output   Musculoskeletal:         General: No swelling or tenderness.      Comments: Right hip dressings CDI   Skin:     General: Skin is warm.      Coloration: Skin is not jaundiced.      Findings: No bruising.   Neurological:      General: No focal deficit present.      Mental Status: He is alert. Mental status is at baseline.   Psychiatric:         Mood and Affect: Mood normal.       Significant Labs: All pertinent labs within the past 24 hours have been reviewed.  CBC:   Recent Labs   Lab 04/07/23 2205 04/08/23 0437 04/09/23 0431   WBC 10.91 7.31 6.73   HGB 11.3* 10.0* 8.3*   HCT 34.1* 30.2* 24.8*    175 137*     CMP:   Recent Labs   Lab 04/07/23 2205 04/08/23 0437 04/09/23 0431    142 140   K 3.5 3.4* 3.8    108 108   CO2 25 25 24   * 138* 117*   BUN 16 17 15   CREATININE 0.8 0.7 0.8   CALCIUM 9.3 9.0 8.2*   PROT 6.3 5.6* 4.9*   ALBUMIN 3.5 3.2* 2.6*   BILITOT 0.4 0.4 0.6   ALKPHOS 67 60 48*   AST 13 12 11   ALT 8* 8* 8*   ANIONGAP 11 9 8       Significant Imaging: I have reviewed all pertinent imaging results/findings within the past 24 hours.      Assessment/Plan:      * Closed intertrochanteric fracture of right femur  POD 1 s/p intramedullary juan nailing right with Dr. Rizvi  Continue to follow Orthopedic recommendations.  Needs aggressive incentive spirometry.  Follow hemoglobin and hematocrit closely.  Pain control with IV narcotics and antiemetics as needed.  Physical therapy as per Orthopedics protocol with fall precautions.   mg PO BID for DVT prophylaxis per orthopedic recommendations.        Advanced care planning/counseling discussion  I spent 5 minutes of face to face discussion regarding advance  directives and end of life planning which included patient and family. Patient/Family understands the seriousness of their condition and would like to make their end of life decisions known as follows-     Pt states he is a DNR in the presence of his daughter. Orders placed         Skin tear of right elbow without complication  Cleaned and dressed with steristrips in the ED  Wound care consulted       Normochromic normocytic anemia  Patient's anemia is currently controlled. Has not received any PRBCs to date..   Current CBC reviewed-   Lab Results   Component Value Date    HGB 8.3 (L) 04/09/2023    HCT 24.8 (L) 04/09/2023     Monitor serial CBC and transfuse if patient becomes hemodynamically unstable, symptomatic or H/H drops below 7/21.         Mixed hyperlipidemia  Continue appropriate home meds      Peripheral polyneuropathy  Chronic, likely led to fall, continue gabapentin       Coronary artery disease involving native coronary artery of native heart without angina pectoris  Does not currently follow with cardiologist  CABG >20 years ago, no recent stress test  [employer] cardiac risk - average cardiac risk for moderate risk surgery  He is medically optimized for surgery based on current available work up    Essential hypertension  Continue appropriate home meds      Chordoma  Aware  nonsurgical candidate  Currently working with [facility] and now [facility] to evaluate candidacy for proton therapy, but likely last resort        VTE Risk Mitigation (From admission, onward)         Ordered     Place sequential compression device  Until discontinued         04/08/23 1529     IP VTE HIGH RISK PATIENT  Once         04/08/23 1529     Place sequential compression device  Until discontinued         04/07/23 8667                Discharge Planning   DARYL:      Code Status: DNR   Is the patient medically ready for discharge?:     Reason for patient still in hospital (select all that apply): Patient trending  condition, PT / OT recommendations and Pending disposition  Discharge Plan A: Home, Home with family                  [place] PA-C  Department of Hospital Medicine   [facility]

## 2023-04-09 NOTE — CARE UPDATE
04/09/23 0826   Patient Assessment/Suction   Level of Consciousness (AVPU) alert   Respiratory Effort Normal;Unlabored   Expansion/Accessory Muscles/Retractions expansion symmetric;no retractions;no use of accessory muscles   Rhythm/Pattern, Respiratory no shortness of breath reported;depth regular;unlabored;pattern regular   Cough Frequency no cough   PRE-TX-O2   Device (Oxygen Therapy) room air   SpO2 99 %   Pulse Oximetry Type Intermittent   $ Pulse Oximetry - Multiple Charge Pulse Oximetry - Multiple   Pulse (!) 55   Resp 18   Positioning HOB elevated 30 degrees   Incentive Spirometer   $ Incentive Spirometer Charges done independently per patient;ready for self-administration;breath hold utilized   Incentive Spirometer Predicted Level (mL) 1800   Administration (IS) self-administered   Number of Repetitions (IS) 5   Level Incentive Spirometer (mL) 2000   Patient Tolerance (IS) good;no adverse signs/symptoms present

## 2023-04-09 NOTE — NURSING
Pt noted to only have 225ml of elis colored urine in oh since start of shift despite having continuous fluids infusing at 150ml/hr. Bladder scanned performed to ensure pt was not retaining urine despite having a oh in place with fluids infusing. Obtained reading of 102. Pt had orders to d/c oh POD 1 after surgery. Oh was originally placed for urinary retention. NP, Rani Dueñas, notified of findings and orders for oh to remain in place with close monitoring of morning labs given.

## 2023-04-09 NOTE — ASSESSMENT & PLAN NOTE
Patient's anemia is currently controlled. Has not received any PRBCs to date..   Current CBC reviewed-   Lab Results   Component Value Date    HGB 8.3 (L) 04/09/2023    HCT 24.8 (L) 04/09/2023     Monitor serial CBC and transfuse if patient becomes hemodynamically unstable, symptomatic or H/H drops below 7/21.

## 2023-04-09 NOTE — ASSESSMENT & PLAN NOTE
POD 1 s/p intramedullary juan nailing right with Dr. Rizvi  Continue to follow Orthopedic recommendations.  Needs aggressive incentive spirometry.  Follow hemoglobin and hematocrit closely.  Pain control with IV narcotics and antiemetics as needed.  Physical therapy as per Orthopedics protocol with fall precautions.   mg PO BID for DVT prophylaxis per orthopedic recommendations.

## 2023-04-09 NOTE — PLAN OF CARE
Problem: Adult Inpatient Plan of Care  Goal: Plan of Care Review  Outcome: Ongoing, Progressing     Problem: Adult Inpatient Plan of Care  Goal: Optimal Comfort and Wellbeing  Outcome: Ongoing, Progressing     POC and meds reviewed with patient, verbalized understanding. Pt AAOx4. Post op site r hip CDI. IV site CDI. Meds given per MAR.  IVF infusing as ordered. Continuous cardiac monitoring, tele #[id number]. Simental in place and draining to monitor urine output. Pt worked with pt this shift, tolerated well. No complaints of pain or discomfort voiced. Safety maintained. Needs attended to.

## 2023-04-09 NOTE — PLAN OF CARE
Goals to be met by: 2023     Patient will increase functional independence with mobility by performin. Supine to sit with Modified [place]  2. Sit to supine with Modified [place]  3. Sit to stand transfer with Minimal Assistance  4. Bed to chair transfer with Minimal Assistance using Rolling Walker  5. Gait  x >/= 50 feet with Minimal Assistance using Rolling Walker.   6. Lower extremity exercise program x20 reps per handout, with independence

## 2023-04-09 NOTE — PLAN OF CARE
Plan of care reviewed with pt. Pt verbalized understanding. Patient is alert and oriented x 4, able to make needs known. Continuous cardiac monitoring in place as ordered. A-febrile throughout the shift. Meds given per MAR. Repositions self independently. No complaints of pain or discomfort. Simental to gravity in place for urinary retention, no s/s of infection noted to insertion site. Purposeful hourly/q2hr rounding done during shift to promote patient safety. NAD noted. Safety maintained with side rails up x3, bed wheels locked, bed in lowest position, call light in reach. Patient educated to call for assistance with ambulation if needed, verbalized understanding. Pt remains free of falls. No further needs expressed at this time. Will continue to monitor.    Problem: Adult Inpatient Plan of Care  Goal: Plan of Care Review  Outcome: Ongoing, Progressing  Goal: Patient-Specific Goal (Individualized)  Outcome: Ongoing, Progressing  Goal: Absence of Hospital-Acquired Illness or Injury  Outcome: Ongoing, Progressing  Goal: Optimal Comfort and Wellbeing  Outcome: Ongoing, Progressing  Goal: Readiness for Transition of Care  Outcome: Ongoing, Progressing     Problem: Impaired Wound Healing  Goal: Optimal Wound Healing  Outcome: Ongoing, Progressing     Problem: Fall Injury Risk  Goal: Absence of Fall and Fall-Related Injury  Outcome: Ongoing, Progressing     Problem: Pain Acute  Goal: Acceptable Pain Control and Functional Ability  Outcome: Ongoing, Progressing     Problem: Mobility Impairment  Goal: Optimal Mobility  Outcome: Ongoing, Progressing     Problem: Skin Injury Risk Increased  Goal: Skin Health and Integrity  Outcome: Ongoing, Progressing     Problem: Infection  Goal: Absence of Infection Signs and Symptoms  Outcome: Ongoing, Progressing

## 2023-04-09 NOTE — PT/OT/SLP EVAL
Physical Therapy Evaluation    Patient Name:  Nimisha Longoria   MRN:  [id number]    Recommendations:     Discharge Recommendations: nursing facility, skilled, rehabilitation facility   Discharge Equipment Recommendations: walker, rolling   Barriers to discharge: None    Assessment:     Nimisha Longoria is a 83 y.o. male admitted with a medical diagnosis of Closed intertrochanteric fracture of right femur.  He presents with the following impairments/functional limitations: weakness, impaired endurance, impaired self care skills, impaired functional mobility, gait instability, impaired balance, decreased lower extremity function, decreased safety awareness, pain, orthopedic precautions. Pt is agreeable to participation in PT evaluation. Pt has significant neuropathy at baseline, Left Lower Extremity worse than Right, and uses rollator. He is unable to feel his Left Lower Extremity well when walking and falls as a result. His wife is able to help some, but he mostly takes care of her. Pt able to perform bed mobility with MIN A, sit to stand with MOD A, and take 2 steps fwd with MOD A using RW. He is unsteady on his feet and demonstrates decreased safety awareness. Pt left supine in bed with head of bed elevated, call button in reach, nurse notified.     Rehab Prognosis: Good; patient would benefit from acute skilled PT services to address these deficits and reach maximum level of function.    Recent Surgery: Procedure(s) (LRB):  INSERTION, INTRAMEDULLARY CHUCKY, FEMUR, synthes, hana table, 1st assist (Right) 1 Day Post-Op    Plan:     During this hospitalization, patient to be seen daily to address the identified rehab impairments via gait training, therapeutic activities, therapeutic exercises and progress toward the following goals:    Plan of Care Expires:  05/09/23    Subjective     Chief Complaint: difficulty walking, falls, weakness in Left Lower Extremity at baseline, and now weakness in Right Lower Extremity  "secondary to surgery. "Feels like he was walking on 1 and 1/2 legs prior to this surgery and now feels like he is walking on 1 leg."  Patient/Family Comments/goals: Maximize strength and mobility prior to discharge. Would like to discuss discharge options for rehab further.     Pain/Comfort:  Pain Rating 1: 0/10    Patients cultural, spiritual, Hoahaoism conflicts given the current situation:      Living Environment:  Pt lives with his spouse in a single story home with no steps to enter.   Prior to admission, patients level of function was Modified independent using Rollator. Left Lower Extremity weakness secondary to neuropathy, history of falls.  Equipment used at home: rollator, shower chair.  DME owned (not currently used): none.  Upon discharge, patient will have assistance from his spouse and daughter who lives in [place].    Objective:     Communicated with Nurse Barragan prior to session.  Patient found HOB elevated with    upon PT entry to room.    General Precautions: Standard, fall  Orthopedic Precautions:RLE weight bearing as tolerated   Braces: N/A  Respiratory Status: Room air    Exams:  Cognitive Exam:  Patient is oriented to Person, Place, Time, and Situation  RUE ROM: WFL  RUE Strength: WFL  LUE ROM: WFL  LUE Strength: WFL  RLE ROM: Deficits: hip flexion, knee extension  RLE Strength: Deficits: grossly 3-/5  LLE ROM: WFL  Left Lower Extremity Strength: WFL     Functional Mobility:  Bed Mobility:     Scooting: minimum assistance  Supine to Sit: minimum assistance  Sit to Supine: minimum assistance  Transfers:     Sit to Stand:  moderate assistance with rolling walker  Gait: x 2 steps fwd and bwd using RW with MOD A , pt with unsteady gait, requires cueing to manage rolling walker safely, decreased safety awareness      AM-PAC 6 CLICK MOBILITY  Total Score:12       Treatment & Education:  - Treatment limited to assessment this date    Patient left HOB elevated with all lines intact, call button in " reach, bed alarm on, and nurse notified.    GOALS:   Multidisciplinary Problems       Physical Therapy Goals          Problem: Physical Therapy    Goal Priority Disciplines Outcome Goal Variances Interventions   Physical Therapy Goal     PT, PT/OT Ongoing, Progressing                         History:     Past Medical History:   Diagnosis Date    Abnormal SPEP 1/17/2021    Anemia     Anemia due to multiple mechanisms 1/17/2021    Anemia, chronic disease 1/17/2021    Coronary artery disease     Hyperlipidemia     Hypertension     Normochromic normocytic anemia 1/17/2021    Sacral chordoma        Past Surgical History:   Procedure Laterality Date    BACK SURGERY      CORONARY ARTERY BYPASS GRAFT      SPINE SURGERY      Dr Villatoro       Time Tracking:     PT Received On: 04/09/23  PT Start Time: 1046     PT Stop Time: 1109  PT Total Time (min): 23 min     Billable Minutes: Evaluation 23 04/09/2023

## 2023-04-10 LAB
ALBUMIN SERPL BCP-MCNC: 2.6 G/DL (ref 3.5–5.2)
ALP SERPL-CCNC: 47 U/L (ref 55–135)
ALT SERPL W/O P-5'-P-CCNC: 6 U/L (ref 10–44)
ANION GAP SERPL CALC-SCNC: 8 MMOL/L (ref 8–16)
AST SERPL-CCNC: 12 U/L (ref 10–40)
BASOPHILS # BLD AUTO: 0.01 K/UL (ref 0–0.2)
BASOPHILS NFR BLD: 0.2 % (ref 0–1.9)
BILIRUB SERPL-MCNC: 0.4 MG/DL (ref 0.1–1)
BUN SERPL-MCNC: 11 MG/DL (ref 8–23)
CALCIUM SERPL-MCNC: 8.4 MG/DL (ref 8.7–10.5)
CHLORIDE SERPL-SCNC: 109 MMOL/L (ref 95–110)
CO2 SERPL-SCNC: 26 MMOL/L (ref 23–29)
CREAT SERPL-MCNC: 0.7 MG/DL (ref 0.5–1.4)
DIFFERENTIAL METHOD: ABNORMAL
EOSINOPHIL # BLD AUTO: 0.1 K/UL (ref 0–0.5)
EOSINOPHIL NFR BLD: 1.7 % (ref 0–8)
ERYTHROCYTE [DISTWIDTH] IN BLOOD BY AUTOMATED COUNT: 13.1 % (ref 11.5–14.5)
EST. GFR  (NO RACE VARIABLE): >60 ML/MIN/1.73 M^2
GLUCOSE SERPL-MCNC: 113 MG/DL (ref 70–110)
HCT VFR BLD AUTO: 25.4 % (ref 40–54)
HGB BLD-MCNC: 8.2 G/DL (ref 14–18)
IMM GRANULOCYTES # BLD AUTO: 0.02 K/UL (ref 0–0.04)
IMM GRANULOCYTES NFR BLD AUTO: 0.3 % (ref 0–0.5)
LYMPHOCYTES # BLD AUTO: 0.6 K/UL (ref 1–4.8)
LYMPHOCYTES NFR BLD: 10.1 % (ref 18–48)
MAGNESIUM SERPL-MCNC: 1.8 MG/DL (ref 1.6–2.6)
MCH RBC QN AUTO: 30 PG (ref 27–31)
MCHC RBC AUTO-ENTMCNC: 32.3 G/DL (ref 32–36)
MCV RBC AUTO: 93 FL (ref 82–98)
MONOCYTES # BLD AUTO: 0.5 K/UL (ref 0.3–1)
MONOCYTES NFR BLD: 8.3 % (ref 4–15)
NEUTROPHILS # BLD AUTO: 4.8 K/UL (ref 1.8–7.7)
NEUTROPHILS NFR BLD: 79.4 % (ref 38–73)
NRBC BLD-RTO: 0 /100 WBC
PHOSPHATE SERPL-MCNC: 2 MG/DL (ref 2.7–4.5)
PLATELET # BLD AUTO: 121 K/UL (ref 150–450)
PMV BLD AUTO: 11 FL (ref 9.2–12.9)
POTASSIUM SERPL-SCNC: 3.9 MMOL/L (ref 3.5–5.1)
PROT SERPL-MCNC: 5.1 G/DL (ref 6–8.4)
RBC # BLD AUTO: 2.73 M/UL (ref 4.6–6.2)
SODIUM SERPL-SCNC: 143 MMOL/L (ref 136–145)
WBC # BLD AUTO: 6.05 K/UL (ref 3.9–12.7)

## 2023-04-10 PROCEDURE — 12000002 HC ACUTE/MED SURGE SEMI-PRIVATE ROOM

## 2023-04-10 PROCEDURE — 94799 UNLISTED PULMONARY SVC/PX: CPT

## 2023-04-10 PROCEDURE — 97110 THERAPEUTIC EXERCISES: CPT

## 2023-04-10 PROCEDURE — 83735 ASSAY OF MAGNESIUM: CPT | Performed by: ORTHOPAEDIC SURGERY

## 2023-04-10 PROCEDURE — 94761 N-INVAS EAR/PLS OXIMETRY MLT: CPT

## 2023-04-10 PROCEDURE — 36415 COLL VENOUS BLD VENIPUNCTURE: CPT | Performed by: ORTHOPAEDIC SURGERY

## 2023-04-10 PROCEDURE — 84100 ASSAY OF PHOSPHORUS: CPT | Performed by: ORTHOPAEDIC SURGERY

## 2023-04-10 PROCEDURE — 80053 COMPREHEN METABOLIC PANEL: CPT | Performed by: ORTHOPAEDIC SURGERY

## 2023-04-10 PROCEDURE — 97165 OT EVAL LOW COMPLEX 30 MIN: CPT

## 2023-04-10 PROCEDURE — 25000003 PHARM REV CODE 250: Performed by: STUDENT IN AN ORGANIZED HEALTH CARE EDUCATION/TRAINING PROGRAM

## 2023-04-10 PROCEDURE — 25000003 PHARM REV CODE 250

## 2023-04-10 PROCEDURE — 25000003 PHARM REV CODE 250: Performed by: ORTHOPAEDIC SURGERY

## 2023-04-10 PROCEDURE — 97116 GAIT TRAINING THERAPY: CPT

## 2023-04-10 PROCEDURE — 97535 SELF CARE MNGMENT TRAINING: CPT

## 2023-04-10 PROCEDURE — 25000242 PHARM REV CODE 250 ALT 637 W/ HCPCS: Performed by: ORTHOPAEDIC SURGERY

## 2023-04-10 PROCEDURE — 85025 COMPLETE CBC W/AUTO DIFF WBC: CPT | Performed by: ORTHOPAEDIC SURGERY

## 2023-04-10 RX ORDER — SODIUM,POTASSIUM PHOSPHATES 280-250MG
2 POWDER IN PACKET (EA) ORAL
Status: DISCONTINUED | OUTPATIENT
Start: 2023-04-10 | End: 2023-04-12 | Stop reason: HOSPADM

## 2023-04-10 RX ADMIN — MUPIROCIN: 20 OINTMENT TOPICAL at 08:04

## 2023-04-10 RX ADMIN — MUPIROCIN: 20 OINTMENT TOPICAL at 09:04

## 2023-04-10 RX ADMIN — GABAPENTIN 300 MG: 300 CAPSULE ORAL at 04:04

## 2023-04-10 RX ADMIN — POTASSIUM & SODIUM PHOSPHATES POWDER PACK 280-160-250 MG 2 PACKET: 280-160-250 PACK at 08:04

## 2023-04-10 RX ADMIN — ASPIRIN 325 MG: 325 TABLET ORAL at 08:04

## 2023-04-10 RX ADMIN — FENOFIBRATE 160 MG: 160 TABLET ORAL at 09:04

## 2023-04-10 RX ADMIN — ATORVASTATIN CALCIUM 40 MG: 40 TABLET, FILM COATED ORAL at 09:04

## 2023-04-10 RX ADMIN — GABAPENTIN 300 MG: 300 CAPSULE ORAL at 09:04

## 2023-04-10 RX ADMIN — ACETAMINOPHEN 650 MG: 325 TABLET ORAL at 08:04

## 2023-04-10 RX ADMIN — METOPROLOL SUCCINATE 25 MG: 25 TABLET, EXTENDED RELEASE ORAL at 11:04

## 2023-04-10 RX ADMIN — TAMSULOSIN HYDROCHLORIDE 0.4 MG: 0.4 CAPSULE ORAL at 08:04

## 2023-04-10 RX ADMIN — ASPIRIN 325 MG: 325 TABLET ORAL at 09:04

## 2023-04-10 RX ADMIN — SODIUM CHLORIDE: 9 INJECTION, SOLUTION INTRAVENOUS at 06:04

## 2023-04-10 RX ADMIN — DOCUSATE SODIUM AND SENNOSIDES 1 TABLET: 8.6; 5 TABLET, FILM COATED ORAL at 09:04

## 2023-04-10 RX ADMIN — DOCUSATE SODIUM AND SENNOSIDES 1 TABLET: 8.6; 5 TABLET, FILM COATED ORAL at 08:04

## 2023-04-10 RX ADMIN — GABAPENTIN 300 MG: 300 CAPSULE ORAL at 08:04

## 2023-04-10 NOTE — ASSESSMENT & PLAN NOTE
POD 2 s/p intramedullary juan nailing right with Dr. Rizvi  Continue to follow Orthopedic recommendations.  Needs aggressive incentive spirometry.  Follow hemoglobin and hematocrit closely.  Pain control with IV narcotics and antiemetics as needed.  Physical therapy as per Orthopedics protocol with fall precautions.   mg PO BID for DVT prophylaxis per orthopedic recommendations.    Discharge disposition pending

## 2023-04-10 NOTE — CARE UPDATE
04/10/23 0754   Patient Assessment/Suction   Level of Consciousness (AVPU) alert   Respiratory Effort Normal;Unlabored   Rhythm/Pattern, Respiratory unlabored;pattern regular;depth regular   PRE-TX-O2   Device (Oxygen Therapy) room air   SpO2 (!) 92 %   Pulse Oximetry Type Intermittent   $ Pulse Oximetry - Multiple Charge Pulse Oximetry - Multiple   Pulse 92   Resp 16   Incentive Spirometer   $ Incentive Spirometer Charges done with encouragement   Incentive Spirometer Predicted Level (mL) 1800   Administration (IS) instruction provided, follow-up   Number of Repetitions (IS) 6   Level Incentive Spirometer (mL) 2250   Patient Tolerance (IS) good

## 2023-04-10 NOTE — ASSESSMENT & PLAN NOTE
Patient's anemia is currently controlled. Has not received any PRBCs to date..   Current CBC reviewed-   Lab Results   Component Value Date    HGB 8.2 (L) 04/10/2023    HCT 25.4 (L) 04/10/2023     Monitor serial CBC and transfuse if patient becomes hemodynamically unstable, symptomatic or H/H drops below 7/21.

## 2023-04-10 NOTE — PROGRESS NOTES
[facility] Hospital Medicine  Progress Note    Patient Name: Nimisha Longoria  MRN: [id number]  Patient Class: IP- Inpatient   Admission Date: 4/7/2023  Length of Stay: 3 days  Attending Physician: Corey Rust MD  Primary Care Provider: Zackery Haddad MD        Subjective:     Principal Problem:Closed intertrochanteric fracture of right femur        HPI:  Mr. 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 It is severe. It is associated with right elbow pain and small skin tear to the right elbow and the inability to bear weight to the right leg. He denies fever, chills, N/V/D, preceding dizziness, head trauma, or LOC. He walks with a walker at baseline and will hold himself up with household items when not supported by the walker. He was at the counter and fell down. He tried to get up, but could not bear weight to the right leg. In the ED, he was noted to have shortening and external rotation of the right leg and right hip xray revealed an acute right intertrochanteric femur frx. Right elbow xray without any acute abnormality. Labs reveal a mild normocytic anemia Hgb 11.3/Hct 34.1. S. Hospital medicine is consulted for admission for further work up and treatment.       Overview/Hospital Course:  The patient was admitted to the hospital with right hip fracture.  Patient was monitored closely throughout his hospital stay.  Ortho was consulted on admission with plan for surgery.  Patient was found to have urinary retention and Simental catheter was placed.      Interval History: Notes reviewed, no acute events overnight. Patient seen this morning resting in bedside chair with no acute distress. He reports right hip pain well controlled at this time. Complains of his  chronic neuropathy. He states he would prefer to not go to Rehab/SNF facility at discharge. Asks if he can do outpatient PT and go home. Patient has elderly wife whom he takes care of. PT recommending placement to Snf/rehab facility. Encouraged patient to think about going to a facility for a few weeks given the PT recommendations. Rehab coordinator to come speak with patient today. Disposition pending.     Review of Systems   Constitutional:  Negative for fatigue and fever.   Respiratory:  Negative for cough and shortness of breath.    Cardiovascular:  Negative for chest pain and palpitations.   Gastrointestinal:  Negative for abdominal distention and abdominal pain.   Genitourinary:  Negative for difficulty urinating and dysuria.   Musculoskeletal:  Negative for arthralgias and myalgias.   Neurological:  Positive for numbness. Negative for headaches.   Psychiatric/Behavioral:  Negative for behavioral problems and confusion.    All other systems reviewed and are negative.  Objective:     Vital Signs (Most Recent):  Temp: 97.8 °F (36.6 °C) (04/10/23 1047)  Pulse: 99 (04/10/23 1112)  Resp: 18 (04/10/23 1047)  BP: (!) 117/53 (04/10/23 1112)  SpO2: 95 % (04/10/23 1047)   Vital Signs (24h Range):  Temp:  [96.9 °F (36.1 °C)-98.8 °F (37.1 °C)] 97.8 °F (36.6 °C)  Pulse:  [63-99] 99  Resp:  [16-18] 18  SpO2:  [92 %-98 %] 95 %  BP: (101-137)/(50-61) 117/53     Weight: 70.8 kg (156 lb 1.4 oz)  Body mass index is 23.73 kg/m².    Intake/Output Summary (Last 24 hours) at 4/10/2023 1140  Last data filed at 4/10/2023 0859  Gross per 24 hour   Intake 2490.27 ml   Output 2350 ml   Net 140.27 ml      Physical Exam  Vitals and nursing note reviewed.   Constitutional:       General: He is not in acute distress.     Appearance: He is normal weight.   HENT:      Head: Normocephalic and atraumatic.   Cardiovascular:      Rate and Rhythm: Normal rate and regular rhythm.      Pulses: Normal pulses.      Heart sounds: Normal heart sounds. No  murmur heard.    No gallop.   Pulmonary:      Effort: Pulmonary effort is normal. No respiratory distress.      Breath sounds: Normal breath sounds. No wheezing or rales.   Abdominal:      General: Abdomen is flat. There is no distension.      Palpations: Abdomen is soft.      Tenderness: There is no abdominal tenderness.   Musculoskeletal:         General: No swelling or tenderness. Normal range of motion.      Comments: Right hip dressings CDI   Neurological:      General: No focal deficit present.      Mental Status: He is alert. Mental status is at baseline.      Cranial Nerves: No cranial nerve deficit.      Motor: No weakness.       Significant Labs: All pertinent labs within the past 24 hours have been reviewed.  CBC:   Recent Labs   Lab 04/09/23  0431 04/10/23  0355   WBC 6.73 6.05   HGB 8.3* 8.2*   HCT 24.8* 25.4*   * 121*     CMP:   Recent Labs   Lab 04/09/23  0431 04/10/23  0355    143   K 3.8 3.9    109   CO2 24 26   * 113*   BUN 15 11   CREATININE 0.8 0.7   CALCIUM 8.2* 8.4*   PROT 4.9* 5.1*   ALBUMIN 2.6* 2.6*   BILITOT 0.6 0.4   ALKPHOS 48* 47*   AST 11 12   ALT 8* 6*   ANIONGAP 8 8       Significant Imaging: I have reviewed all pertinent imaging results/findings within the past 24 hours.      Assessment/Plan:      * Closed intertrochanteric fracture of right femur  POD 2 s/p intramedullary juan nailing right with Dr. Rizvi  Continue to follow Orthopedic recommendations.  Needs aggressive incentive spirometry.  Follow hemoglobin and hematocrit closely.  Pain control with IV narcotics and antiemetics as needed.  Physical therapy as per Orthopedics protocol with fall precautions.   mg PO BID for DVT prophylaxis per orthopedic recommendations.    Discharge disposition pending     Advanced care planning/counseling discussion  I spent 5 minutes of face to face discussion regarding advance directives and end of life planning which included patient and family. Patient/Family  understands the seriousness of their condition and would like to make their end of life decisions known as follows-     Pt states he is a DNR in the presence of his daughter. Orders placed         Skin tear of right elbow without complication  Cleaned and dressed with steristrips in the ED  Wound care consulted       Normochromic normocytic anemia  Patient's anemia is currently controlled. Has not received any PRBCs to date..   Current CBC reviewed-   Lab Results   Component Value Date    HGB 8.2 (L) 04/10/2023    HCT 25.4 (L) 04/10/2023     Monitor serial CBC and transfuse if patient becomes hemodynamically unstable, symptomatic or H/H drops below 7/21.         Mixed hyperlipidemia  Continue appropriate home meds      Peripheral polyneuropathy  Chronic, likely led to fall, continue gabapentin       Coronary artery disease involving native coronary artery of native heart without angina pectoris  Does not currently follow with cardiologist  CABG >20 years ago, no recent stress test  [employer] cardiac risk - average cardiac risk for moderate risk surgery  He is medically optimized for surgery based on current available work up    Essential hypertension  Continue appropriate home meds      Chordoma  Aware  nonsurgical candidate  Currently working with [facility] and now [facility] to evaluate candidacy for proton therapy, but likely last resort        VTE Risk Mitigation (From admission, onward)         Ordered     Place sequential compression device  Until discontinued         04/08/23 1529     IP VTE HIGH RISK PATIENT  Once         04/08/23 1529     Place sequential compression device  Until discontinued         04/07/23 0267                Discharge Planning   DARYL:      Code Status: DNR   Is the patient medically ready for discharge?:     Reason for patient still in hospital (select all that apply): Patient trending condition and Pending disposition  Discharge Plan A: Home, Home with family                   [place] PA-C  Department of Hospital Medicine   [facility]

## 2023-04-10 NOTE — PLAN OF CARE
MIGUELITO sent patient information to [facility] via [employer] system.  Per Sailaja with [facility], patient accepted.  MIGUELITO sent patient information to [facility] [phone] for Inpatient Rehab approval.       04/10/23 1641   Post-Acute Status   Post-Acute Authorization Placement   Post-Acute Placement Status Pending payor review/awaiting authorization (if required)

## 2023-04-10 NOTE — PT/OT/SLP PROGRESS
Physical Therapy Treatment    Patient Name:  Nimisha Longoria   MRN:  [id number]    Recommendations:     Discharge Recommendations: nursing facility, skilled, rehabilitation facility  Discharge Equipment Recommendations: walker, rolling  Barriers to discharge:  patient lives with elderly spouse so may need admission to facility for rehab prior to DC home    Assessment:     Nimisha Longoria is a 83 y.o. male admitted with a medical diagnosis of Closed intertrochanteric fracture of right femur.  He presents with the following impairments/functional limitations: weakness, impaired endurance, impaired functional mobility, gait instability, impaired balance, decreased lower extremity function, pain, decreased ROM, edema, orthopedic precautions .  Patient agreeable to PT treatment this morning.  Patient presented supine in bed and required min assist to transfer to sitting and min assist to stand with a RW.  Patient then ambulated x 50 feet with RW with min assist.  Patient's mobility limited by recent injury but also bilateral LE neuropathy.      Rehab Prognosis: Good; patient would benefit from acute skilled PT services to address these deficits and reach maximum level of function.    Recent Surgery: Procedure(s) (LRB):  INSERTION, INTRAMEDULLARY CHUCKY, FEMUR, synthes, hana table, 1st assist (Right) 2 Days Post-Op    Plan:     During this hospitalization, patient to be seen daily to address the identified rehab impairments via gait training, therapeutic activities, therapeutic exercises and progress toward the following goals:    Plan of Care Expires:  05/09/23    Subjective     Chief Complaint: pain  Patient/Family Comments/goals: go home when ready  Pain/Comfort:  Pain Rating 1: 0/10  Location - Orientation 1: lower  Location 1: hip  Pain Addressed 1: Reposition, Cessation of Activity  Pain Rating Post-Intervention 1: 4/10 (pain increased slightly with gait.)      Objective:     Communicated with nurse prior to session.   Patient found supine with bed alarm upon PT entry to room.     General Precautions: Standard, fall  Orthopedic Precautions: RLE weight bearing as tolerated  Braces: N/A  Respiratory Status: Room air     Functional Mobility:  Bed Mobility:     Supine to Sit: minimum assistance  Transfers:     Sit to Stand:  minimum assistance with rolling walker  Gait: x 50 feet rW CGA      AM-PAC 6 CLICK MOBILITY          Treatment & Education:  Exercise to include ankle pumps, quad sets, heel slides, hip abd and LAQ.  All done right LE x 10 reps.  Gait training x 50 feet RW CGA    Patient left up in chair with call button in reach, chair alarm on, and nurse notified..    GOALS:   Multidisciplinary Problems       Physical Therapy Goals          Problem: Physical Therapy    Goal Priority Disciplines Outcome Goal Variances Interventions   Physical Therapy Goal     PT, PT/OT Ongoing, Progressing                         Time Tracking:     PT Received On: 04/10/23  PT Start Time: 0854     PT Stop Time: 0918  PT Total Time (min): 24 min     Billable Minutes: Gait Training 12 and Therapeutic Exercise 12    Treatment Type: Treatment  PT/PTA: PT     Number of PTA visits since last PT visit: 0     04/10/2023

## 2023-04-10 NOTE — SUBJECTIVE & OBJECTIVE
Interval History: Notes reviewed, no acute events overnight. Patient seen this morning resting in bedside chair with no acute distress. He reports right hip pain well controlled at this time. Complains of his chronic neuropathy. He states he would prefer to not go to Rehab/SNF facility at discharge. Asks if he can do outpatient PT and go home. Patient has elderly wife whom he takes care of. PT recommending placement to Snf/rehab facility. Encouraged patient to think about going to a facility for a few weeks given the PT recommendations. Rehab coordinator to come speak with patient today. Disposition pending.     Review of Systems   Constitutional:  Negative for fatigue and fever.   Respiratory:  Negative for cough and shortness of breath.    Cardiovascular:  Negative for chest pain and palpitations.   Gastrointestinal:  Negative for abdominal distention and abdominal pain.   Genitourinary:  Negative for difficulty urinating and dysuria.   Musculoskeletal:  Negative for arthralgias and myalgias.   Neurological:  Positive for numbness. Negative for headaches.   Psychiatric/Behavioral:  Negative for behavioral problems and confusion.    All other systems reviewed and are negative.  Objective:     Vital Signs (Most Recent):  Temp: 97.8 °F (36.6 °C) (04/10/23 1047)  Pulse: 99 (04/10/23 1112)  Resp: 18 (04/10/23 1047)  BP: (!) 117/53 (04/10/23 1112)  SpO2: 95 % (04/10/23 1047)   Vital Signs (24h Range):  Temp:  [96.9 °F (36.1 °C)-98.8 °F (37.1 °C)] 97.8 °F (36.6 °C)  Pulse:  [63-99] 99  Resp:  [16-18] 18  SpO2:  [92 %-98 %] 95 %  BP: (101-137)/(50-61) 117/53     Weight: 70.8 kg (156 lb 1.4 oz)  Body mass index is 23.73 kg/m².    Intake/Output Summary (Last 24 hours) at 4/10/2023 1140  Last data filed at 4/10/2023 0859  Gross per 24 hour   Intake 2490.27 ml   Output 2350 ml   Net 140.27 ml      Physical Exam  Vitals and nursing note reviewed.   Constitutional:       General: He is not in acute distress.     Appearance: He  is normal weight.   HENT:      Head: Normocephalic and atraumatic.   Cardiovascular:      Rate and Rhythm: Normal rate and regular rhythm.      Pulses: Normal pulses.      Heart sounds: Normal heart sounds. No murmur heard.    No gallop.   Pulmonary:      Effort: Pulmonary effort is normal. No respiratory distress.      Breath sounds: Normal breath sounds. No wheezing or rales.   Abdominal:      General: Abdomen is flat. There is no distension.      Palpations: Abdomen is soft.      Tenderness: There is no abdominal tenderness.   Musculoskeletal:         General: No swelling or tenderness. Normal range of motion.      Comments: Right hip dressings CDI   Neurological:      General: No focal deficit present.      Mental Status: He is alert. Mental status is at baseline.      Cranial Nerves: No cranial nerve deficit.      Motor: No weakness.       Significant Labs: All pertinent labs within the past 24 hours have been reviewed.  CBC:   Recent Labs   Lab 04/09/23  0431 04/10/23  0355   WBC 6.73 6.05   HGB 8.3* 8.2*   HCT 24.8* 25.4*   * 121*     CMP:   Recent Labs   Lab 04/09/23  0431 04/10/23  0355    143   K 3.8 3.9    109   CO2 24 26   * 113*   BUN 15 11   CREATININE 0.8 0.7   CALCIUM 8.2* 8.4*   PROT 4.9* 5.1*   ALBUMIN 2.6* 2.6*   BILITOT 0.6 0.4   ALKPHOS 48* 47*   AST 11 12   ALT 8* 6*   ANIONGAP 8 8       Significant Imaging: I have reviewed all pertinent imaging results/findings within the past 24 hours.

## 2023-04-10 NOTE — PT/OT/SLP EVAL
Occupational Therapy   Evaluation    Name: Nimisha Longoria  MRN: [id number]  Admitting Diagnosis: Closed intertrochanteric fracture of right femur  Recent Surgery: Procedure(s) (LRB):  INSERTION, INTRAMEDULLARY CHUCKY, FEMUR, synthes, hana table, 1st assist (Right) 2 Days Post-Op    Recommendations:     Discharge Recommendations: nursing facility, skilled, rehabilitation facility  Discharge Equipment Recommendations:  walker, rolling  Barriers to discharge:  Decreased caregiver support    Assessment:     Nimisha Longoria is a 83 y.o. male with a medical diagnosis of Closed intertrochanteric fracture of right femur.  He presents with R hip pain only with mobility. Patient also reported BLE neuropathy at baseline. Patient's neuropathy starts at his knees to his feet. Patient was still able to ambulate in room despite BLE neuropathy. Patient reported decreased standing tolerance requiring grooming tasks to be performed while seated.  Performance deficits affecting function: weakness, impaired endurance, impaired self care skills, impaired functional mobility, gait instability, impaired balance, decreased lower extremity function, decreased coordination, decreased ROM, pain, orthopedic precautions.      Rehab Prognosis: Good; patient would benefit from acute skilled OT services to address these deficits and reach maximum level of function.       Plan:     Patient to be seen 5 x/week to address the above listed problems via self-care/home management, therapeutic activities, therapeutic exercises  Plan of Care Expires: 05/08/23  Plan of Care Reviewed with: patient    Subjective     Chief Complaint: mild R hip with mobility  Patient/Family Comments/goals: none    Occupational Profile:  Living Environment: Patient lives with spouse in a [facility].   Previous level of function: Patient was modified independent with ADLs and ambulatory with rollator.   Equipment Used at Home: rollator, shower chair  Assistance upon Discharge: Patient  will receive assistance from spouse.     Pain/Comfort:  Pain Rating 1: 0/10  Location - Orientation 1: generalized  Location 1: hip  Pain Addressed 1: Reposition, Distraction  Pain Rating Post-Intervention 1: 4/10    Patients cultural, spiritual, Uatsdin conflicts given the current situation:      Objective:     Communicated with: nurse Barragan prior to session.  Patient found up in chair with chair check, telemetry upon OT entry to room.    General Precautions: Standard, fall  Orthopedic Precautions: RLE weight bearing as tolerated  Braces: N/A  Respiratory Status: Room air    Occupational Performance:    Bed Mobility:    Not assessed; patient seated in chair upon arrival. See PT notes.     Functional Mobility/Transfers:  Patient completed Sit <> Stand Transfer with minimum assistance  with  rolling walker   Patient completed Bed <> Bedside commode Transfer using Stand Pivot technique with minimum assistance with rolling walker    Activities of Daily Living:  Grooming: supervision with oral/facial hygiene and shaving while seated in chair  Lower Body Dressing: maximal assistance to don/doff socks while seated in chair  Toileting: minimum assistance with wiping buttock area while seated on BSC; but no BM produced. Patient has oh cath in place.     Cognitive/Visual Perceptual:  Cognitive/Psychosocial Skills:     -       Oriented to: x4   -       Follows Commands/attention:Follows multistep  commands  -       Communication: clear/fluent  -       Safety awareness/insight to disability: intact   -       Mood/Affect/Coping skills/emotional control: Appropriate to situation and Cooperative  Visual/Perceptual:      -Intact     Physical Exam:  Postural examination/scapula alignment:    -       Rounded shoulders  -       Forward head  Upper Extremity Range of Motion:     -       Right Upper Extremity: WFL  -       Left Upper Extremity: WFL  Upper Extremity Strength:    -       Right Upper Extremity: WFL  -       Left  Upper Extremity: WFL   Strength:    -       Right Upper Extremity: WFL  -       Left Upper Extremity: WFL  Fine Motor Coordination:    -       Intact  Gross motor coordination:   WFL in BUE    AMPAC 6 Click ADL:  AMPAC Total Score: 18    Treatment & Education:  OT ed pt on OT role & POC as well as discharge recommendations.  OT ed patient on safety with walker use for functional mobility with cues for hand placement & sequencing.   OT ed pt on use of adaptive equipment for LB dressing & safe item retrieval with reacher with demonstration provided.      Patient left up in chair with all lines intact, call button in reach, chair alarm on, and nurse notified    GOALS:   Multidisciplinary Problems       Occupational Therapy Goals          Problem: Occupational Therapy    Goal Priority Disciplines Outcome Interventions   Occupational Therapy Goal     OT, PT/OT Ongoing, Progressing    Description: Goals to be met by: 5/8/2023     Patient will increase functional independence with ADLs by performing:    LE Dressing with Supervision and Assistive Devices as needed.  Grooming while standing at sink with Supervision.  Toileting from toilet with Supervision for hygiene and clothing management.   Supine to sit with Supervision.  Toilet transfer to toilet with Supervision.                         History:     Past Medical History:   Diagnosis Date    Abnormal SPEP 1/17/2021    Anemia     Anemia due to multiple mechanisms 1/17/2021    Anemia, chronic disease 1/17/2021    Coronary artery disease     Hyperlipidemia     Hypertension     Normochromic normocytic anemia 1/17/2021    Sacral chordoma          Past Surgical History:   Procedure Laterality Date    BACK SURGERY      CORONARY ARTERY BYPASS GRAFT      INTRAMEDULLARY RODDING OF FEMUR Right 4/8/2023    Procedure: INSERTION, INTRAMEDULLARY CHUCKY, FEMUR, synthes, hana table, 1st assist;  Surgeon: Tyler Rizvi MD;  [facility]  Service: Orthopedics;   Laterality: Right;    SPINE SURGERY      Dr Villatoro       Time Tracking:     OT Date of Treatment: 11/01/23  OT Start Time: 1055  OT Stop Time: 1150  OT Total Time (min): 55 min    Billable Minutes:Evaluation 10  Self Care/Home Management 45    4/10/2023

## 2023-04-10 NOTE — PLAN OF CARE
Problem: Occupational Therapy  Goal: Occupational Therapy Goal  Description: Goals to be met by: 5/8/2023     Patient will increase functional independence with ADLs by performing:    LE Dressing with Supervision and Assistive Devices as needed.  Grooming while standing at sink with Supervision.  Toileting from toilet with Supervision for hygiene and clothing management.   Supine to sit with Supervision.  Toilet transfer to toilet with Supervision.    Outcome: Ongoing, Progressing

## 2023-04-10 NOTE — CONSULTS
Wound care consult completed to R arm. Wound is nearly healed. Cleansed skin with wound cleanser and gauze and applied a thick layer of triad.     Patient tolerated with no complaint.

## 2023-04-11 LAB
ANION GAP SERPL CALC-SCNC: 7 MMOL/L (ref 8–16)
BASOPHILS # BLD AUTO: 0.02 K/UL (ref 0–0.2)
BASOPHILS NFR BLD: 0.3 % (ref 0–1.9)
BUN SERPL-MCNC: 14 MG/DL (ref 8–23)
CALCIUM SERPL-MCNC: 8.6 MG/DL (ref 8.7–10.5)
CHLORIDE SERPL-SCNC: 107 MMOL/L (ref 95–110)
CO2 SERPL-SCNC: 26 MMOL/L (ref 23–29)
CREAT SERPL-MCNC: 0.7 MG/DL (ref 0.5–1.4)
DIFFERENTIAL METHOD: ABNORMAL
EOSINOPHIL # BLD AUTO: 0.1 K/UL (ref 0–0.5)
EOSINOPHIL NFR BLD: 1.7 % (ref 0–8)
ERYTHROCYTE [DISTWIDTH] IN BLOOD BY AUTOMATED COUNT: 13 % (ref 11.5–14.5)
EST. GFR  (NO RACE VARIABLE): >60 ML/MIN/1.73 M^2
GLUCOSE SERPL-MCNC: 116 MG/DL (ref 70–110)
HCT VFR BLD AUTO: 24.2 % (ref 40–54)
HGB BLD-MCNC: 7.9 G/DL (ref 14–18)
IMM GRANULOCYTES # BLD AUTO: 0.02 K/UL (ref 0–0.04)
IMM GRANULOCYTES NFR BLD AUTO: 0.3 % (ref 0–0.5)
LYMPHOCYTES # BLD AUTO: 0.7 K/UL (ref 1–4.8)
LYMPHOCYTES NFR BLD: 12 % (ref 18–48)
MCH RBC QN AUTO: 30.4 PG (ref 27–31)
MCHC RBC AUTO-ENTMCNC: 32.6 G/DL (ref 32–36)
MCV RBC AUTO: 93 FL (ref 82–98)
MONOCYTES # BLD AUTO: 0.5 K/UL (ref 0.3–1)
MONOCYTES NFR BLD: 8.1 % (ref 4–15)
NEUTROPHILS # BLD AUTO: 4.6 K/UL (ref 1.8–7.7)
NEUTROPHILS NFR BLD: 77.6 % (ref 38–73)
NRBC BLD-RTO: 0 /100 WBC
PLATELET # BLD AUTO: 126 K/UL (ref 150–450)
PMV BLD AUTO: 10.6 FL (ref 9.2–12.9)
POTASSIUM SERPL-SCNC: 3.7 MMOL/L (ref 3.5–5.1)
RBC # BLD AUTO: 2.6 M/UL (ref 4.6–6.2)
SODIUM SERPL-SCNC: 140 MMOL/L (ref 136–145)
WBC # BLD AUTO: 5.91 K/UL (ref 3.9–12.7)

## 2023-04-11 PROCEDURE — 25000003 PHARM REV CODE 250: Performed by: STUDENT IN AN ORGANIZED HEALTH CARE EDUCATION/TRAINING PROGRAM

## 2023-04-11 PROCEDURE — 94761 N-INVAS EAR/PLS OXIMETRY MLT: CPT

## 2023-04-11 PROCEDURE — 51798 US URINE CAPACITY MEASURE: CPT

## 2023-04-11 PROCEDURE — 25000003 PHARM REV CODE 250: Performed by: ORTHOPAEDIC SURGERY

## 2023-04-11 PROCEDURE — 30200315 PPD INTRADERMAL TEST REV CODE 302

## 2023-04-11 PROCEDURE — 12000002 HC ACUTE/MED SURGE SEMI-PRIVATE ROOM

## 2023-04-11 PROCEDURE — 25000003 PHARM REV CODE 250

## 2023-04-11 PROCEDURE — 99900035 HC TECH TIME PER 15 MIN (STAT)

## 2023-04-11 PROCEDURE — 97535 SELF CARE MNGMENT TRAINING: CPT

## 2023-04-11 PROCEDURE — 86580 TB INTRADERMAL TEST: CPT

## 2023-04-11 PROCEDURE — 25000242 PHARM REV CODE 250 ALT 637 W/ HCPCS: Performed by: ORTHOPAEDIC SURGERY

## 2023-04-11 PROCEDURE — 94799 UNLISTED PULMONARY SVC/PX: CPT

## 2023-04-11 PROCEDURE — 97116 GAIT TRAINING THERAPY: CPT

## 2023-04-11 PROCEDURE — 97110 THERAPEUTIC EXERCISES: CPT

## 2023-04-11 PROCEDURE — 80048 BASIC METABOLIC PNL TOTAL CA: CPT

## 2023-04-11 PROCEDURE — 85025 COMPLETE CBC W/AUTO DIFF WBC: CPT

## 2023-04-11 PROCEDURE — 36415 COLL VENOUS BLD VENIPUNCTURE: CPT

## 2023-04-11 RX ADMIN — METOPROLOL SUCCINATE 25 MG: 25 TABLET, EXTENDED RELEASE ORAL at 08:04

## 2023-04-11 RX ADMIN — MUPIROCIN: 20 OINTMENT TOPICAL at 08:04

## 2023-04-11 RX ADMIN — GABAPENTIN 300 MG: 300 CAPSULE ORAL at 03:04

## 2023-04-11 RX ADMIN — TUBERCULIN PURIFIED PROTEIN DERIVATIVE 5 UNITS: 5 INJECTION, SOLUTION INTRADERMAL at 03:04

## 2023-04-11 RX ADMIN — TAMSULOSIN HYDROCHLORIDE 0.4 MG: 0.4 CAPSULE ORAL at 08:04

## 2023-04-11 RX ADMIN — ATORVASTATIN CALCIUM 40 MG: 40 TABLET, FILM COATED ORAL at 08:04

## 2023-04-11 RX ADMIN — MUPIROCIN: 20 OINTMENT TOPICAL at 09:04

## 2023-04-11 RX ADMIN — ASPIRIN 325 MG: 325 TABLET ORAL at 08:04

## 2023-04-11 RX ADMIN — SODIUM CHLORIDE: 9 INJECTION, SOLUTION INTRAVENOUS at 08:04

## 2023-04-11 RX ADMIN — FENOFIBRATE 160 MG: 160 TABLET ORAL at 08:04

## 2023-04-11 RX ADMIN — DOCUSATE SODIUM AND SENNOSIDES 1 TABLET: 8.6; 5 TABLET, FILM COATED ORAL at 08:04

## 2023-04-11 RX ADMIN — SODIUM CHLORIDE: 9 INJECTION, SOLUTION INTRAVENOUS at 05:04

## 2023-04-11 RX ADMIN — GABAPENTIN 300 MG: 300 CAPSULE ORAL at 08:04

## 2023-04-11 RX ADMIN — LEVOTHYROXINE SODIUM 50 MCG: 50 TABLET ORAL at 05:04

## 2023-04-11 RX ADMIN — POLYETHYLENE GLYCOL (3350) 17 G: 17 POWDER, FOR SOLUTION ORAL at 03:04

## 2023-04-11 NOTE — SUBJECTIVE & OBJECTIVE
Interval History: Notes reviewed, no acute events overnight. Patient seen this morning resting in bedside chair with no acute distress. He had a temp of 101 last night that resolved with prn antipyretic. CXR without any acute abnormalities. He denies any calf pain. Right hip pain well controlled at this time. Simental to be removed for voiding trial today. Disposition pending Snf vs Rehab.     Review of Systems   Constitutional:  Negative for chills, fatigue and fever.   Respiratory:  Negative for cough, shortness of breath and wheezing.    Cardiovascular:  Negative for chest pain and palpitations.   Gastrointestinal:  Negative for abdominal distention, abdominal pain, nausea and vomiting.   Genitourinary:  Negative for dysuria and frequency.   Musculoskeletal:  Negative for arthralgias and myalgias.   Neurological:  Positive for numbness.        Chronic peripheral neuropathy   Psychiatric/Behavioral:  Negative for behavioral problems and confusion.    Objective:     Vital Signs (Most Recent):  Temp: 98.3 °F (36.8 °C) (04/11/23 0746)  Pulse: 77 (04/11/23 1006)  Resp: 18 (04/11/23 1006)  BP: (!) 142/65 (04/11/23 0746)  SpO2: 97 % (04/11/23 1006)   Vital Signs (24h Range):  Temp:  [97.6 °F (36.4 °C)-101 °F (38.3 °C)] 98.3 °F (36.8 °C)  Pulse:  [64-81] 77  Resp:  [18] 18  SpO2:  [95 %-99 %] 97 %  BP: ()/(50-65) 142/65     Weight: 70.8 kg (156 lb 1.4 oz)  Body mass index is 23.73 kg/m².    Intake/Output Summary (Last 24 hours) at 4/11/2023 1117  Last data filed at 4/11/2023 0715  Gross per 24 hour   Intake 1280.62 ml   Output 1850 ml   Net -569.38 ml      Physical Exam  Vitals and nursing note reviewed.   Constitutional:       General: He is not in acute distress.     Appearance: He is not ill-appearing.   HENT:      Head: Normocephalic and atraumatic.   Cardiovascular:      Rate and Rhythm: Normal rate and regular rhythm.      Pulses: Normal pulses.      Heart sounds: Normal heart sounds. No murmur heard.    No  gallop.   Pulmonary:      Effort: Pulmonary effort is normal. No respiratory distress.      Breath sounds: Normal breath sounds. No wheezing or rales.   Abdominal:      General: Abdomen is flat. There is no distension.      Palpations: Abdomen is soft.      Tenderness: There is no abdominal tenderness.   Musculoskeletal:         General: No swelling or tenderness. Normal range of motion.      Comments: Right hip dressings CDI   Skin:     General: Skin is warm.      Coloration: Skin is not jaundiced.   Neurological:      General: No focal deficit present.      Mental Status: He is alert. Mental status is at baseline.      Cranial Nerves: No cranial nerve deficit.      Motor: No weakness.   Psychiatric:         Mood and Affect: Mood normal.       Significant Labs: All pertinent labs within the past 24 hours have been reviewed.  CBC:   Recent Labs   Lab 04/10/23  0355 04/11/23  0755   WBC 6.05 5.91   HGB 8.2* 7.9*   HCT 25.4* 24.2*   * 126*     CMP:   Recent Labs   Lab 04/10/23  0355 04/11/23  0755    140   K 3.9 3.7    107   CO2 26 26   * 116*   BUN 11 14   CREATININE 0.7 0.7   CALCIUM 8.4* 8.6*   PROT 5.1*  --    ALBUMIN 2.6*  --    BILITOT 0.4  --    ALKPHOS 47*  --    AST 12  --    ALT 6*  --    ANIONGAP 8 7*       Significant Imaging: I have reviewed all pertinent imaging results/findings within the past 24 hours.

## 2023-04-11 NOTE — PT/OT/SLP PROGRESS
Occupational Therapy   Treatment    Name: Nimisha Longoria  MRN: [id number]  Admitting Diagnosis:  Closed intertrochanteric fracture of right femur  3 Days Post-Op    Recommendations:     Discharge Recommendations: nursing facility, skilled, rehabilitation facility  Discharge Equipment Recommendations:  walker, rolling  Barriers to discharge:  Decreased caregiver support    Assessment:     Nimisha Longoria is a 83 y.o. male with a medical diagnosis of Closed intertrochanteric fracture of right femur.  Performance deficits affecting function are weakness, impaired endurance, impaired self care skills, impaired functional mobility, gait instability, impaired balance, decreased lower extremity function, decreased ROM, orthopedic precautions.     Rehab Prognosis:  Good; patient would benefit from acute skilled OT services to address these deficits and reach maximum level of function.       Plan:     Patient to be seen 5 x/week to address the above listed problems via self-care/home management, therapeutic activities, therapeutic exercises  Plan of Care Expires: 05/08/23  Plan of Care Reviewed with: patient    Subjective     Chief Complaint: none  Patient/Family Comments/goals: none  Pain/Comfort:  Pain Rating 1: 0/10  Pain Rating Post-Intervention 1: 0/10    Objective:     Communicated with: nurse Perez prior to session.  Patient found up in chair with  (chair alarm) upon OT entry to room.    General Precautions: Standard, fall    Orthopedic Precautions:RLE weight bearing as tolerated  Braces: N/A  Respiratory Status: Room air     Occupational Performance:     Activities of Daily Living:  Grooming: supervision with all grooming tasks while seated in chair      [facility] 6 Click ADL:      Patient left up in chair with all lines intact, call button in reach, and chair alarm on    GOALS:   Multidisciplinary Problems       Occupational Therapy Goals          Problem: Occupational Therapy    Goal Priority Disciplines Outcome  Interventions   Occupational Therapy Goal     OT, PT/OT Ongoing, Progressing    Description: Goals to be met by: 5/8/2023     Patient will increase functional independence with ADLs by performing:    LE Dressing with Supervision and Assistive Devices as needed.  Grooming while standing at sink with Supervision.  Toileting from toilet with Supervision for hygiene and clothing management.   Supine to sit with Supervision.  Toilet transfer to toilet with Supervision.                         Time Tracking:     OT Date of Treatment: 04/11/23  OT Start Time: 0945  OT Stop Time: 1005  OT Total Time (min): 20 min    Billable Minutes:Self Care/Home Management 20    OT/VICK: OT          4/11/2023

## 2023-04-11 NOTE — CARE UPDATE
04/11/23 1006   Patient Assessment/Suction   Level of Consciousness (AVPU) alert   Respiratory Effort Normal;Unlabored   Expansion/Accessory Muscles/Retractions no use of accessory muscles;no retractions;expansion symmetric   All Lung Fields Breath Sounds diminished   Rhythm/Pattern, Respiratory unlabored;pattern regular;depth regular   PRE-TX-O2   Device (Oxygen Therapy) room air   SpO2 97 %   Pulse Oximetry Type Intermittent   $ Pulse Oximetry - Multiple Charge Pulse Oximetry - Multiple   Pulse 77   Resp 18   Positioning Sitting in chair   Incentive Spirometer   $ Incentive Spirometer Charges done with encouragement   Incentive Spirometer Predicted Level (mL) 2000   Administration (IS) instruction provided, follow-up   Number of Repetitions (IS) 10   Level Incentive Spirometer (mL) 2300   Patient Tolerance (IS) good

## 2023-04-11 NOTE — PLAN OF CARE
POC/Meds reviewed, pt verbalized understanding. Vitals stable.  Tele In place-[id number]. Ivf infusing. Simental to gravity. Prn medication given for temp of 101. Up with x1 assist. Repositions self. Hourly/Q2hr rounding performed, safety maintained. Bed in lowest position, wheels locked, SR up x2, call light in easy reach. No  complaints at this time. Will continue to monitor.

## 2023-04-11 NOTE — PT/OT/SLP PROGRESS
Physical Therapy Treatment    Patient Name:  Nimisha Longoria   MRN:  [id number]    Recommendations:     Discharge Recommendations: rehabilitation facility  Discharge Equipment Recommendations: walker, rolling  Barriers to discharge: None    Assessment:     Nimisha Longoria is a 83 y.o. male admitted with a medical diagnosis of Closed intertrochanteric fracture of right femur.  He presents with the following impairments/functional limitations: weakness, impaired endurance, impaired functional mobility, gait instability, impaired balance, decreased lower extremity function, pain, decreased ROM, orthopedic precautions .  Patient agreeable to PT treatment this morning. Patient presented supine in bed and required min assist to transfer to sitting and then min assist to stand.  Patient then ambulated x 75 feet rW min assist due to bilateral LE shaking with weight bearing.    Rehab Prognosis: Good; patient would benefit from acute skilled PT services to address these deficits and reach maximum level of function.    Recent Surgery: Procedure(s) (LRB):  INSERTION, INTRAMEDULLARY CHUCKY, FEMUR, synthes, hana table, 1st assist (Right) 3 Days Post-Op    Plan:     During this hospitalization, patient to be seen daily to address the identified rehab impairments via gait training, therapeutic activities, therapeutic exercises and progress toward the following goals:    Plan of Care Expires:  05/09/23    Subjective     Chief Complaint: pain  Patient/Family Comments/goals: go to rehab facility  Pain/Comfort:  Pain Rating 1: 4/10  Location - Side 1: Right  Location - Orientation 1: lower  Location 1: hip  Pain Addressed 1: Reposition, Cessation of Activity  Pain Rating Post-Intervention 1: 4/10      Objective:     Communicated with nurse prior to session.  Patient found supine with bed alarm upon PT entry to room.     General Precautions: Standard, fall  Orthopedic Precautions: RLE weight bearing as tolerated  Braces: N/A  Respiratory  Status: Room air     Functional Mobility:  Bed Mobility:     Supine to Sit: minimum assistance  Transfers:     Sit to Stand:  minimum assistance with rolling walker  Gait: x 75 feet RW min assist      AM-PAC 6 CLICK MOBILITY          Treatment & Education:  Exercise to include ankle pumps, quad sets, heel slides, hip abd and LAQ.  All done right LE x 10 reps.  Gait training x 75 feet RW min assist due to bilateral LE shaking when weight bearing    Patient left up in chair with call button in reach, chair alarm on, and nurse notified..    GOALS:   Multidisciplinary Problems       Physical Therapy Goals          Problem: Physical Therapy    Goal Priority Disciplines Outcome Goal Variances Interventions   Physical Therapy Goal     PT, PT/OT Ongoing, Progressing                         Time Tracking:     PT Received On: 04/11/23  PT Start Time: 0831     PT Stop Time: 0855  PT Total Time (min): 24 min     Billable Minutes: Gait Training 14 and Therapeutic Exercise 10    Treatment Type: Treatment  PT/PTA: PT     Number of PTA visits since last PT visit: 0     04/11/2023

## 2023-04-11 NOTE — ASSESSMENT & PLAN NOTE
POD 3 s/p intramedullary juan nailing right with Dr. Rizvi  Continue to follow Orthopedic recommendations.  Needs aggressive incentive spirometry.  Follow hemoglobin and hematocrit closely.  Pain control with IV narcotics and antiemetics as needed.  Physical therapy as per Orthopedics protocol with fall precautions.   mg PO BID for DVT prophylaxis per orthopedic recommendations.    Discharge disposition pending

## 2023-04-11 NOTE — PLAN OF CARE
IP Rehab auth pending.       04/11/23 0902   Post-Acute Status   Post-Acute Authorization Placement   Post-Acute Placement Status Pending payor review/awaiting authorization (if required)

## 2023-04-11 NOTE — PROGRESS NOTES
[facility] Hospital Medicine  Progress Note    Patient Name: Nimisha Longoria  MRN: [id number]  Patient Class: IP- Inpatient   Admission Date: 4/7/2023  Length of Stay: 4 days  Attending Physician: Corey Rust MD  Primary Care Provider: Zackery Haddad MD        Subjective:     Principal Problem:Closed intertrochanteric fracture of right femur        HPI:  Mr. 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 It is severe. It is associated with right elbow pain and small skin tear to the right elbow and the inability to bear weight to the right leg. He denies fever, chills, N/V/D, preceding dizziness, head trauma, or LOC. He walks with a walker at baseline and will hold himself up with household items when not supported by the walker. He was at the counter and fell down. He tried to get up, but could not bear weight to the right leg. In the ED, he was noted to have shortening and external rotation of the right leg and right hip xray revealed an acute right intertrochanteric femur frx. Right elbow xray without any acute abnormality. Labs reveal a mild normocytic anemia Hgb 11.3/Hct 34.1. S. Hospital medicine is consulted for admission for further work up and treatment.       Overview/Hospital Course:  The patient was admitted to the hospital with right hip fracture.  Patient was monitored closely throughout his hospital stay.  Ortho was consulted on admission with plan for surgery.  Patient was found to have urinary retention and Simental catheter was placed.      Interval History: Notes reviewed, no acute events overnight. Patient seen this morning resting in bedside chair with no acute distress. He had a temp of 101 last night that resolved with prn antipyretic. CXR  without any acute abnormalities. He denies any calf pain. Right hip pain well controlled at this time. Simental to be removed for voiding trial today. Disposition pending Snf vs Rehab.     Review of Systems   Constitutional:  Negative for chills, fatigue and fever.   Respiratory:  Negative for cough, shortness of breath and wheezing.    Cardiovascular:  Negative for chest pain and palpitations.   Gastrointestinal:  Negative for abdominal distention, abdominal pain, nausea and vomiting.   Genitourinary:  Negative for dysuria and frequency.   Musculoskeletal:  Negative for arthralgias and myalgias.   Neurological:  Positive for numbness.        Chronic peripheral neuropathy   Psychiatric/Behavioral:  Negative for behavioral problems and confusion.    Objective:     Vital Signs (Most Recent):  Temp: 98.3 °F (36.8 °C) (04/11/23 0746)  Pulse: 77 (04/11/23 1006)  Resp: 18 (04/11/23 1006)  BP: (!) 142/65 (04/11/23 0746)  SpO2: 97 % (04/11/23 1006)   Vital Signs (24h Range):  Temp:  [97.6 °F (36.4 °C)-101 °F (38.3 °C)] 98.3 °F (36.8 °C)  Pulse:  [64-81] 77  Resp:  [18] 18  SpO2:  [95 %-99 %] 97 %  BP: ()/(50-65) 142/65     Weight: 70.8 kg (156 lb 1.4 oz)  Body mass index is 23.73 kg/m².    Intake/Output Summary (Last 24 hours) at 4/11/2023 1117  Last data filed at 4/11/2023 0715  Gross per 24 hour   Intake 1280.62 ml   Output 1850 ml   Net -569.38 ml      Physical Exam  Vitals and nursing note reviewed.   Constitutional:       General: He is not in acute distress.     Appearance: He is not ill-appearing.   HENT:      Head: Normocephalic and atraumatic.   Cardiovascular:      Rate and Rhythm: Normal rate and regular rhythm.      Pulses: Normal pulses.      Heart sounds: Normal heart sounds. No murmur heard.    No gallop.   Pulmonary:      Effort: Pulmonary effort is normal. No respiratory distress.      Breath sounds: Normal breath sounds. No wheezing or rales.   Abdominal:      General: Abdomen is flat. There is no  distension.      Palpations: Abdomen is soft.      Tenderness: There is no abdominal tenderness.   Musculoskeletal:         General: No swelling or tenderness. Normal range of motion.      Comments: Right hip dressings CDI   Skin:     General: Skin is warm.      Coloration: Skin is not jaundiced.   Neurological:      General: No focal deficit present.      Mental Status: He is alert. Mental status is at baseline.      Cranial Nerves: No cranial nerve deficit.      Motor: No weakness.   Psychiatric:         Mood and Affect: Mood normal.       Significant Labs: All pertinent labs within the past 24 hours have been reviewed.  CBC:   Recent Labs   Lab 04/10/23  0355 04/11/23  0755   WBC 6.05 5.91   HGB 8.2* 7.9*   HCT 25.4* 24.2*   * 126*     CMP:   Recent Labs   Lab 04/10/23  0355 04/11/23  0755    140   K 3.9 3.7    107   CO2 26 26   * 116*   BUN 11 14   CREATININE 0.7 0.7   CALCIUM 8.4* 8.6*   PROT 5.1*  --    ALBUMIN 2.6*  --    BILITOT 0.4  --    ALKPHOS 47*  --    AST 12  --    ALT 6*  --    ANIONGAP 8 7*       Significant Imaging: I have reviewed all pertinent imaging results/findings within the past 24 hours.      Assessment/Plan:      * Closed intertrochanteric fracture of right femur  POD 3 s/p intramedullary juan nailing right with Dr. Rizvi  Continue to follow Orthopedic recommendations.  Needs aggressive incentive spirometry.  Follow hemoglobin and hematocrit closely.  Pain control with IV narcotics and antiemetics as needed.  Physical therapy as per Orthopedics protocol with fall precautions.   mg PO BID for DVT prophylaxis per orthopedic recommendations.    Discharge disposition pending     Advanced care planning/counseling discussion  I spent 5 minutes of face to face discussion regarding advance directives and end of life planning which included patient and family. Patient/Family understands the seriousness of their condition and would like to make their end of life  decisions known as follows-     Pt states he is a DNR in the presence of his daughter. Orders placed         Skin tear of right elbow without complication  Cleaned and dressed with steristrips in the ED  Wound care consulted       Normochromic normocytic anemia  Patient's anemia is currently controlled. Has not received any PRBCs to date..   Current CBC reviewed-   Lab Results   Component Value Date    HGB 7.9 (L) 04/11/2023    HCT 24.2 (L) 04/11/2023     Monitor serial CBC and transfuse if patient becomes hemodynamically unstable, symptomatic or H/H drops below 7/21.         Mixed hyperlipidemia  Continue appropriate home meds      Peripheral polyneuropathy  Chronic, likely led to fall, continue gabapentin       Coronary artery disease involving native coronary artery of native heart without angina pectoris  Does not currently follow with cardiologist  CABG >20 years ago, no recent stress test  [employer] cardiac risk - average cardiac risk for moderate risk surgery  He is medically optimized for surgery based on current available work up    Essential hypertension  Continue appropriate home meds      Chordoma  Aware  nonsurgical candidate  Currently working with [facility] and now [facility] to evaluate candidacy for proton therapy, but likely last resort        VTE Risk Mitigation (From admission, onward)         Ordered     Place sequential compression device  Until discontinued         04/08/23 1529     IP VTE HIGH RISK PATIENT  Once         04/08/23 1529     Place sequential compression device  Until discontinued         04/07/23 1937                Discharge Planning   DARYL: 4/11/2023     Code Status: DNR   Is the patient medically ready for discharge?:     Reason for patient still in hospital (select all that apply): Pending disposition  Discharge Plan A: Home, Home with family                  [place] PA-C  Department of Hospital Medicine   [facility]

## 2023-04-11 NOTE — PLAN OF CARE
Care plan and meds reviewed. Pt verbalized understanding. VSS throughout shift. Pt on room air. Tele in place NSR. Up with x1 assist to BSC. Kamille Wise today. DTV @1800. TB test administered. Awaiting placement into SNF. IS at bedside. Instructed on use and return demonstration performed. Wound care performed per wound care orders. Repositions self. Safety maintained. Call light within reach. No complaints at this time.

## 2023-04-11 NOTE — PLAN OF CARE
04/10/23 2001   Patient Assessment/Suction   Level of Consciousness (AVPU) alert   Respiratory Effort Normal;Unlabored   Expansion/Accessory Muscles/Retractions expansion symmetric   Rhythm/Pattern, Respiratory pattern regular   Cough Frequency no cough   PRE-TX-O2   Device (Oxygen Therapy) room air   SpO2 98 %   Pulse Oximetry Type Intermittent   Incentive Spirometer   $ Incentive Spirometer Charges done with encouragement   Administration (IS) instruction provided, follow-up   Number of Repetitions (IS) 10   Level Incentive Spirometer (mL) 2250   Patient Tolerance (IS) good

## 2023-04-11 NOTE — PLAN OF CARE
Problem: Adult Inpatient Plan of Care  Goal: Plan of Care Review  Outcome: Ongoing, Progressing     Problem: Adult Inpatient Plan of Care  Goal: Optimal Comfort and Wellbeing  Outcome: Ongoing, Progressing    Plan of care and meds reviewed with patient, verbalized understanding. Pt AAOx4. VSS. Post op site r hip CDI. IV site CDI. Meds given per MAR. IVF infusing as ordered. Continuous cardiac monitoring, tele #[id number]. Simental in place and draining for urinary retention. Pt worked with pt this shift, tolerated well. No complaints of pain or discomfort voiced.  Purposeful hourly/q2hr rounding done during shift to promote patient safety. Safety maintained. Needs attended to.

## 2023-04-11 NOTE — ASSESSMENT & PLAN NOTE
Patient's anemia is currently controlled. Has not received any PRBCs to date..   Current CBC reviewed-   Lab Results   Component Value Date    HGB 7.9 (L) 04/11/2023    HCT 24.2 (L) 04/11/2023     Monitor serial CBC and transfuse if patient becomes hemodynamically unstable, symptomatic or H/H drops below 7/21.

## 2023-04-12 VITALS
OXYGEN SATURATION: 98 % | TEMPERATURE: 99 F | BODY MASS INDEX: 23.65 KG/M2 | RESPIRATION RATE: 18 BRPM | SYSTOLIC BLOOD PRESSURE: 140 MMHG | HEIGHT: 68 IN | DIASTOLIC BLOOD PRESSURE: 65 MMHG | WEIGHT: 156.06 LBS | HEART RATE: 83 BPM

## 2023-04-12 LAB
ABO + RH BLD: NORMAL
ALBUMIN SERPL BCP-MCNC: 2.4 G/DL (ref 3.5–5.2)
ALP SERPL-CCNC: 46 U/L (ref 55–135)
ALT SERPL W/O P-5'-P-CCNC: 5 U/L (ref 10–44)
ANION GAP SERPL CALC-SCNC: 8 MMOL/L (ref 8–16)
AST SERPL-CCNC: 15 U/L (ref 10–40)
BASOPHILS # BLD AUTO: 0.01 K/UL (ref 0–0.2)
BASOPHILS NFR BLD: 0.2 % (ref 0–1.9)
BILIRUB SERPL-MCNC: 0.7 MG/DL (ref 0.1–1)
BLD GP AB SCN CELLS X3 SERPL QL: NORMAL
BLD PROD TYP BPU: NORMAL
BLOOD UNIT EXPIRATION DATE: NORMAL
BLOOD UNIT TYPE CODE: 5100
BLOOD UNIT TYPE: NORMAL
BUN SERPL-MCNC: 12 MG/DL (ref 8–23)
CALCIUM SERPL-MCNC: 8.6 MG/DL (ref 8.7–10.5)
CHLORIDE SERPL-SCNC: 107 MMOL/L (ref 95–110)
CO2 SERPL-SCNC: 24 MMOL/L (ref 23–29)
CODING SYSTEM: NORMAL
CREAT SERPL-MCNC: 0.7 MG/DL (ref 0.5–1.4)
CROSSMATCH INTERPRETATION: NORMAL
DIFFERENTIAL METHOD: ABNORMAL
DISPENSE STATUS: NORMAL
EOSINOPHIL # BLD AUTO: 0.2 K/UL (ref 0–0.5)
EOSINOPHIL NFR BLD: 2.8 % (ref 0–8)
ERYTHROCYTE [DISTWIDTH] IN BLOOD BY AUTOMATED COUNT: 13 % (ref 11.5–14.5)
EST. GFR  (NO RACE VARIABLE): >60 ML/MIN/1.73 M^2
GLUCOSE SERPL-MCNC: 118 MG/DL (ref 70–110)
HCT VFR BLD AUTO: 23.6 % (ref 40–54)
HGB BLD-MCNC: 7.4 G/DL (ref 14–18)
HGB BLD-MCNC: 7.6 G/DL (ref 14–18)
IMM GRANULOCYTES # BLD AUTO: 0.02 K/UL (ref 0–0.04)
IMM GRANULOCYTES NFR BLD AUTO: 0.4 % (ref 0–0.5)
LYMPHOCYTES # BLD AUTO: 0.8 K/UL (ref 1–4.8)
LYMPHOCYTES NFR BLD: 14.2 % (ref 18–48)
MCH RBC QN AUTO: 29.9 PG (ref 27–31)
MCHC RBC AUTO-ENTMCNC: 32.2 G/DL (ref 32–36)
MCV RBC AUTO: 93 FL (ref 82–98)
MONOCYTES # BLD AUTO: 0.4 K/UL (ref 0.3–1)
MONOCYTES NFR BLD: 7.8 % (ref 4–15)
NEUTROPHILS # BLD AUTO: 4 K/UL (ref 1.8–7.7)
NEUTROPHILS NFR BLD: 74.6 % (ref 38–73)
NRBC BLD-RTO: 0 /100 WBC
NUM UNITS TRANS PACKED RBC: NORMAL
PLATELET # BLD AUTO: 145 K/UL (ref 150–450)
PMV BLD AUTO: 10.6 FL (ref 9.2–12.9)
POTASSIUM SERPL-SCNC: 4 MMOL/L (ref 3.5–5.1)
PROT SERPL-MCNC: 5.3 G/DL (ref 6–8.4)
RBC # BLD AUTO: 2.54 M/UL (ref 4.6–6.2)
SODIUM SERPL-SCNC: 139 MMOL/L (ref 136–145)
SPECIMEN OUTDATE: NORMAL
WBC # BLD AUTO: 5.41 K/UL (ref 3.9–12.7)

## 2023-04-12 PROCEDURE — 80053 COMPREHEN METABOLIC PANEL: CPT

## 2023-04-12 PROCEDURE — 25000003 PHARM REV CODE 250: Performed by: ORTHOPAEDIC SURGERY

## 2023-04-12 PROCEDURE — 85025 COMPLETE CBC W/AUTO DIFF WBC: CPT

## 2023-04-12 PROCEDURE — 25000242 PHARM REV CODE 250 ALT 637 W/ HCPCS: Performed by: ORTHOPAEDIC SURGERY

## 2023-04-12 PROCEDURE — 36430 TRANSFUSION BLD/BLD COMPNT: CPT

## 2023-04-12 PROCEDURE — 86900 BLOOD TYPING SEROLOGIC ABO: CPT

## 2023-04-12 PROCEDURE — 94761 N-INVAS EAR/PLS OXIMETRY MLT: CPT

## 2023-04-12 PROCEDURE — 97110 THERAPEUTIC EXERCISES: CPT

## 2023-04-12 PROCEDURE — 36415 COLL VENOUS BLD VENIPUNCTURE: CPT

## 2023-04-12 PROCEDURE — 94799 UNLISTED PULMONARY SVC/PX: CPT

## 2023-04-12 PROCEDURE — 85018 HEMOGLOBIN: CPT

## 2023-04-12 PROCEDURE — P9016 RBC LEUKOCYTES REDUCED: HCPCS

## 2023-04-12 PROCEDURE — 86920 COMPATIBILITY TEST SPIN: CPT

## 2023-04-12 PROCEDURE — 25000003 PHARM REV CODE 250

## 2023-04-12 PROCEDURE — 97535 SELF CARE MNGMENT TRAINING: CPT

## 2023-04-12 RX ORDER — HYDROCODONE BITARTRATE AND ACETAMINOPHEN 500; 5 MG/1; MG/1
TABLET ORAL
Status: DISCONTINUED | OUTPATIENT
Start: 2023-04-12 | End: 2023-04-12 | Stop reason: HOSPADM

## 2023-04-12 RX ORDER — LEVOTHYROXINE SODIUM 50 UG/1
50 TABLET ORAL
Status: CANCELLED | OUTPATIENT
Start: 2023-04-13

## 2023-04-12 RX ORDER — MAG HYDROX/ALUMINUM HYD/SIMETH 200-200-20
30 SUSPENSION, ORAL (FINAL DOSE FORM) ORAL 4 TIMES DAILY PRN
Status: CANCELLED | OUTPATIENT
Start: 2023-04-12

## 2023-04-12 RX ORDER — MUPIROCIN 20 MG/G
OINTMENT TOPICAL 2 TIMES DAILY
Status: CANCELLED | OUTPATIENT
Start: 2023-04-12 | End: 2023-04-14

## 2023-04-12 RX ORDER — GABAPENTIN 300 MG/1
300 CAPSULE ORAL 3 TIMES DAILY
Status: CANCELLED | OUTPATIENT
Start: 2023-04-12

## 2023-04-12 RX ORDER — SODIUM,POTASSIUM PHOSPHATES 280-250MG
2 POWDER IN PACKET (EA) ORAL
Status: CANCELLED | OUTPATIENT
Start: 2023-04-12

## 2023-04-12 RX ORDER — GLUCAGON 1 MG
1 KIT INJECTION
Status: CANCELLED | OUTPATIENT
Start: 2023-04-12

## 2023-04-12 RX ORDER — ONDANSETRON 2 MG/ML
4 INJECTION INTRAMUSCULAR; INTRAVENOUS EVERY 6 HOURS PRN
Status: CANCELLED | OUTPATIENT
Start: 2023-04-12

## 2023-04-12 RX ORDER — SODIUM CHLORIDE 9 MG/ML
INJECTION, SOLUTION INTRAVENOUS CONTINUOUS
Status: CANCELLED | OUTPATIENT
Start: 2023-04-12

## 2023-04-12 RX ORDER — MORPHINE SULFATE 2 MG/ML
2 INJECTION, SOLUTION INTRAMUSCULAR; INTRAVENOUS EVERY 4 HOURS PRN
Status: CANCELLED | OUTPATIENT
Start: 2023-04-12

## 2023-04-12 RX ORDER — ASPIRIN 325 MG
325 TABLET ORAL 2 TIMES DAILY
Status: CANCELLED | OUTPATIENT
Start: 2023-04-12

## 2023-04-12 RX ORDER — FENOFIBRATE 160 MG/1
160 TABLET ORAL DAILY
Status: CANCELLED | OUTPATIENT
Start: 2023-04-13

## 2023-04-12 RX ORDER — AMOXICILLIN 250 MG
1 CAPSULE ORAL 2 TIMES DAILY
Status: CANCELLED | OUTPATIENT
Start: 2023-04-12

## 2023-04-12 RX ORDER — ATORVASTATIN CALCIUM 40 MG/1
40 TABLET, FILM COATED ORAL DAILY
Status: CANCELLED | OUTPATIENT
Start: 2023-04-13

## 2023-04-12 RX ORDER — POLYETHYLENE GLYCOL 3350 17 G/17G
17 POWDER, FOR SOLUTION ORAL 2 TIMES DAILY PRN
Status: CANCELLED | OUTPATIENT
Start: 2023-04-12

## 2023-04-12 RX ORDER — NALOXONE HCL 0.4 MG/ML
0.02 VIAL (ML) INJECTION
Status: CANCELLED | OUTPATIENT
Start: 2023-04-12

## 2023-04-12 RX ORDER — TALC
6 POWDER (GRAM) TOPICAL NIGHTLY PRN
Status: CANCELLED | OUTPATIENT
Start: 2023-04-12

## 2023-04-12 RX ORDER — IBUPROFEN 200 MG
16 TABLET ORAL
Status: CANCELLED | OUTPATIENT
Start: 2023-04-12

## 2023-04-12 RX ORDER — SODIUM CHLORIDE 0.9 % (FLUSH) 0.9 %
10 SYRINGE (ML) INJECTION EVERY 12 HOURS PRN
Status: CANCELLED | OUTPATIENT
Start: 2023-04-12

## 2023-04-12 RX ORDER — IBUPROFEN 200 MG
24 TABLET ORAL
Status: CANCELLED | OUTPATIENT
Start: 2023-04-12

## 2023-04-12 RX ORDER — HYDROCODONE BITARTRATE AND ACETAMINOPHEN 500; 5 MG/1; MG/1
TABLET ORAL
Status: CANCELLED | OUTPATIENT
Start: 2023-04-12

## 2023-04-12 RX ORDER — HYDROCODONE BITARTRATE AND ACETAMINOPHEN 5; 325 MG/1; MG/1
1 TABLET ORAL EVERY 6 HOURS PRN
Status: CANCELLED | OUTPATIENT
Start: 2023-04-12

## 2023-04-12 RX ORDER — TAMSULOSIN HYDROCHLORIDE 0.4 MG/1
0.4 CAPSULE ORAL NIGHTLY
Status: CANCELLED | OUTPATIENT
Start: 2023-04-12

## 2023-04-12 RX ORDER — LANOLIN ALCOHOL/MO/W.PET/CERES
800 CREAM (GRAM) TOPICAL
Status: CANCELLED | OUTPATIENT
Start: 2023-04-12

## 2023-04-12 RX ORDER — SODIUM CHLORIDE 0.9 % (FLUSH) 0.9 %
5 SYRINGE (ML) INJECTION
Status: CANCELLED | OUTPATIENT
Start: 2023-04-12

## 2023-04-12 RX ORDER — ACETAMINOPHEN 325 MG/1
650 TABLET ORAL EVERY 8 HOURS PRN
Status: CANCELLED | OUTPATIENT
Start: 2023-04-12

## 2023-04-12 RX ORDER — METOPROLOL SUCCINATE 25 MG/1
25 TABLET, EXTENDED RELEASE ORAL DAILY
Status: CANCELLED | OUTPATIENT
Start: 2023-04-13

## 2023-04-12 RX ADMIN — ATORVASTATIN CALCIUM 40 MG: 40 TABLET, FILM COATED ORAL at 08:04

## 2023-04-12 RX ADMIN — SODIUM CHLORIDE: 9 INJECTION, SOLUTION INTRAVENOUS at 07:04

## 2023-04-12 RX ADMIN — GABAPENTIN 300 MG: 300 CAPSULE ORAL at 08:04

## 2023-04-12 RX ADMIN — GABAPENTIN 300 MG: 300 CAPSULE ORAL at 04:04

## 2023-04-12 RX ADMIN — ASPIRIN 325 MG: 325 TABLET ORAL at 08:04

## 2023-04-12 RX ADMIN — MELATONIN TAB 3 MG 6 MG: 3 TAB at 12:04

## 2023-04-12 RX ADMIN — DOCUSATE SODIUM AND SENNOSIDES 1 TABLET: 8.6; 5 TABLET, FILM COATED ORAL at 08:04

## 2023-04-12 RX ADMIN — FENOFIBRATE 160 MG: 160 TABLET ORAL at 08:04

## 2023-04-12 NOTE — PT/OT/SLP PROGRESS
Physical Therapy Treatment    Patient Name:  Nimisha Longoria   MRN:  [id number]    Recommendations:     Discharge Recommendations: rehabilitation facility  Discharge Equipment Recommendations: walker, rolling  Barriers to discharge: None    Assessment:     Nimisha Longoria is a 83 y.o. male admitted with a medical diagnosis of Closed intertrochanteric fracture of right femur.  He presents with the following impairments/functional limitations: weakness, impaired endurance, impaired functional mobility, gait instability, impaired balance, decreased lower extremity function, pain, decreased ROM, edema, orthopedic precautions   Patient declined PT on first two attempts today but was agreeable to exercise in chair on third attempt.  Patient very fatigued today and was scheduled for a blood transfusion later today before transferring to the rehab facility.    Rehab Prognosis: Good; patient would benefit from acute skilled PT services to address these deficits and reach maximum level of function.    Recent Surgery: Procedure(s) (LRB):  INSERTION, INTRAMEDULLARY CHUCKY, FEMUR, synthes, hana table, 1st assist (Right) 4 Days Post-Op    Plan:     During this hospitalization, patient to be seen daily to address the identified rehab impairments via gait training, therapeutic activities, therapeutic exercises and progress toward the following goals:    Plan of Care Expires:  05/09/23    Subjective     Chief Complaint: fatigue  Patient/Family Comments/goals: get stronger  Pain/Comfort:  Pain Rating 1: 2/10  Location - Side 1: Bilateral  Location - Orientation 1: lower  Location 1: hip  Pain Addressed 1: Reposition, Cessation of Activity  Pain Rating Post-Intervention 1: 5/10 (pain increaseed with AAROM right hip flexion)      Objective:     Communicated with nurse prior to session.  Patient found up in chair with chair check upon PT entry to room.     General Precautions: Standard, fall  Orthopedic Precautions: RLE weight bearing as  tolerated  Braces: N/A  Respiratory Status: Room air      AM-PAC 6 CLICK MOBILITY          Treatment & Education:  Exercise to include LAQ, hip flexion, isometric hip abd, isometric hip add and toe raise.  All done bilateral LE x 10 reps with 3 second hold.    Patient left up in chair with call button in reach, chair alarm on, nurse notified, and phlebotomist  present..    GOALS:   Multidisciplinary Problems       Physical Therapy Goals          Problem: Physical Therapy    Goal Priority Disciplines Outcome Goal Variances Interventions   Physical Therapy Goal     PT, PT/OT Ongoing, Progressing                         Time Tracking:     PT Received On: 04/12/23  PT Start Time: 1131     PT Stop Time: 1146  PT Total Time (min): 15 min     Billable Minutes: Therapeutic Exercise 15    Treatment Type: Treatment  PT/PTA: PT     Number of PTA visits since last PT visit: 0     04/12/2023

## 2023-04-12 NOTE — PT/OT/SLP PROGRESS
"Occupational Therapy   Treatment    Name: Nimisha Longoria  MRN: [id number]  Admitting Diagnosis:  Closed intertrochanteric fracture of right femur  4 Days Post-Op    Recommendations:     Discharge Recommendations: nursing facility, skilled, rehabilitation facility  Discharge Equipment Recommendations:  walker, rolling  Barriers to discharge:  Decreased caregiver support    Assessment:     Nimisha Longoria is a 83 y.o. male with a medical diagnosis of Closed intertrochanteric fracture of right femur.  He presents with unsteadiness with standing and taking initial steps from a stationary position, requiring Min A for balance. Noted improved standing tolerance at sink. Performance deficits affecting function are weakness, impaired endurance, impaired self care skills, impaired functional mobility, gait instability, impaired balance, decreased lower extremity function, decreased coordination, pain, decreased ROM, orthopedic precautions.     Rehab Prognosis:  Good; patient would benefit from acute skilled OT services to address these deficits and reach maximum level of function.       Plan:     Patient to be seen 5 x/week to address the above listed problems via self-care/home management, therapeutic activities, therapeutic exercises  Plan of Care Expires: 05/08/23  Plan of Care Reviewed with: patient    Subjective     Chief Complaint: R hip pain  Patient/Family Comments/goals: "I gotta get better."  Pain/Comfort:  Pain Rating 1:  (not rated)  Location - Side 1: Right  Location - Orientation 1: generalized  Location 1: hip  Pain Addressed 1: Reposition, Distraction  Pain Rating Post-Intervention 1:  (not rated)    Objective:     Communicated with: nurse Perez prior to session.  Patient found HOB elevated with bed alarm, telemetry upon OT entry to room.    General Precautions: Standard, fall    Orthopedic Precautions:RLE weight bearing as tolerated  Braces: N/A  Respiratory Status: Room air     Occupational Performance: "     Bed Mobility:    Patient completed Scooting/Bridging with contact guard assistance  Patient completed Supine to Sit with moderate assistance     Functional Mobility/Transfers:  Patient completed Sit <> Stand Transfer with minimum assistance and moderate assistance  with  rolling walker   Patient completed Bed <> Chair Transfer using Stand Pivot technique with minimum assistance and moderate assistance with rolling walker  Functional Mobility: Patient ambulated in room from bed>sink>chair requiring Min A using RW. Noted unsteadiness with standing and taking initial steps from a stationary position.     Activities of Daily Living:  Grooming: supervision with oral/facial hygiene and shaving while seated at sink  Toileting: stand by assistance with wiping buttock area, but patient was unable to produce BM hygiene.      [facility] 6 Click ADL:      Treatment & Education:  OT ed patient on safety with walker use for functional mobility with cues for hand placement & sequencing.       Patient left up in chair with all lines intact, call button in reach, chair alarm on, and PT present    GOALS:   Multidisciplinary Problems       Occupational Therapy Goals          Problem: Occupational Therapy    Goal Priority Disciplines Outcome Interventions   Occupational Therapy Goal     OT, PT/OT Ongoing, Progressing    Description: Goals to be met by: 5/8/2023     Patient will increase functional independence with ADLs by performing:    LE Dressing with Supervision and Assistive Devices as needed.  Grooming while standing at sink with Supervision.  Toileting from toilet with Supervision for hygiene and clothing management.   Supine to sit with Supervision.  Toilet transfer to toilet with Supervision.                         Time Tracking:     OT Date of Treatment: 04/12/23  OT Start Time: 1037  OT Stop Time: 1130  OT Total Time (min): 53 min    Billable Minutes:Self Care/Home Management 53    OT/VICK: OT          4/12/2023

## 2023-04-12 NOTE — PRE ADMISSION SCREENING
[facility]   Pre-Admission Patient Screening                    Pre-Screen type:  SNF    Facility Status: Accept     Referring Physician:  Autumn Stevens     Admitting Physician:  Corey Rust MD    Primary Care Physician:  Zackery Haddad MD    History         Patient Active Problem List    Diagnosis Date Noted    Closed intertrochanteric fracture of right femur 04/08/2023    Skin tear of right elbow without complication 04/08/2023    Advanced care planning/counseling discussion 04/08/2023    Infrarenal abdominal aortic aneurysm (AAA) without rupture 11/14/2022    Abnormal SPEP 01/17/2021    Anemia due to multiple mechanisms 01/17/2021    Normochromic normocytic anemia 01/17/2021    Anemia, chronic disease 01/17/2021    Peripheral polyneuropathy 03/31/2020    Mixed hyperlipidemia 03/31/2020    Essential hypertension 10/03/2019    Coronary artery disease involving native coronary artery of native heart without angina pectoris 10/03/2019    History of nuclear stress test 10/03/2019    Umbilical hernia 10/03/2019    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    Anemia 01/14/2015    Iron deficiency anemia 01/12/2015         Previous Specialties/Consulted physicians:      Wound Care  and Other: orthopedic surgery       Past and Current Medical History    Past Medical History:   Diagnosis Date    Abnormal SPEP 1/17/2021    Anemia     Anemia due to multiple mechanisms 1/17/2021    Anemia, chronic disease 1/17/2021    Coronary artery disease     Hyperlipidemia     Hypertension     Normochromic normocytic anemia 1/17/2021    Sacral chordoma            History of Present Illness     Principal Problem:Closed intertrochanteric fracture of right femur           HPI:  Mr. 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 It is severe. It is associated with right elbow pain and small skin tear to the right elbow and the inability to bear weight to the right leg. He denies fever, chills, N/V/D, preceding dizziness, head trauma, or LOC. He walks with a walker at baseline and will hold himself up with household items when not supported by the walker. He was at the counter and fell down. He tried to get up, but could not bear weight to the right leg. In the ED, he was noted to have shortening and external rotation of the right leg and right hip xray revealed an acute right intertrochanteric femur frx. Right elbow xray without any acute abnormality. Labs reveal a mild normocytic anemia Hgb 11.3/Hct 34.1. VSS. Hospital medicine is consulted for admission for further work up and treatment.         Overview/Hospital Course:  The patient was admitted to the hospital with right hip fracture.  Patient was monitored closely throughout his hospital stay.  Ortho was consulted on admission with plan for surgery.  Patient was found to have urinary retention and Simental catheter was placed.        Interval History: Notes reviewed, no acute events overnight. Patient seen this morning resting in bedside chair with no acute distress. He had a temp of 101 last night that resolved with prn antipyretic. CXR without any acute abnormalities. He denies any calf pain. Right hip pain well controlled at this time. Simental to be removed for voiding trial today. Disposition pending Snf vs Rehab.       Closed intertrochanteric fracture of right femur  POD 3 s/p intramedullary juan nailing right with Dr. Rizvi  Continue to follow Orthopedic recommendations.  Needs aggressive incentive spirometry.  Follow hemoglobin and hematocrit closely.  Pain control with IV narcotics and antiemetics as needed.  Physical therapy as per Orthopedics protocol with fall precautions.   mg PO BID for DVT prophylaxis per orthopedic recommendations.      Discharge disposition pending      Advanced care planning/counseling discussion  I spent 5 minutes of face to face discussion regarding advance directives and end of life planning which included patient and family. Patient/Family understands the seriousness of their condition and would like to make their end of life decisions known as follows-      Pt states he is a DNR in the presence of his daughter. Orders placed            Skin tear of right elbow without complication  Cleaned and dressed with steristrips in the ED  Wound care consulted         Normochromic normocytic anemia  Patient's anemia is currently controlled. Has not received any PRBCs to date..   Current CBC reviewed-         Lab Results   Component Value Date     HGB 7.9 (L) 04/11/2023     HCT 24.2 (L) 04/11/2023      Monitor serial CBC and transfuse if patient becomes hemodynamically unstable, symptomatic or H/H drops below 7/21.            Mixed hyperlipidemia  Continue appropriate home meds        Peripheral polyneuropathy  Chronic, likely led to fall, continue gabapentin         Coronary artery disease involving native coronary artery of native heart without angina pectoris  Does not currently follow with cardiologist  CABG >20 years ago, no recent stress test  [employer] cardiac risk - average cardiac risk for moderate risk surgery  He is medically optimized for surgery based on current available work up     Essential hypertension  Continue appropriate home meds        Chordoma  Aware  nonsurgical candidate  Currently working with [facility] and now [facility] to evaluate candidacy for proton therapy, but likely last resort          SNF Admission Criteria:    Primary: Rehab Services     Actively treated hospital diagnosis/diagnoses: Closed intertrochanteric fracture of right femur  Skin tear of right elbow without complication, Peripheral polyneuropathy      Patient Traveled outside of the [place] in the last 3 months? no     Patient  "discharged from this LTAC to SNF within the last 45 days? no    Patient discharged from this LTAC to Rehab within the last 27 days? no    Prior residence: home    Prior Post-Acute Services: home health    Allergies: Review of patient's allergies indicates:  No Known Allergies    Has patient received the current influenza vaccine (Oct 1 - March 31)? N/A (April 1st - September 30th)    Has patient received PPD skin test prior to admit? Yes, Date Received: 4/12/23    Code Status: DNR    Orientation: Time, Place, Person, and Events    Speech: normal     Vital Signs:     Date 4/12/23    Blood Pressure 114/55    Pulse 62    Respiratory Rate 16    O2 Saturation 98% Room Air     Temperature 98.3        Bowel/Bladder: incontinent of bladder and incontinent of bowel    Dialysis: N/A         Peripheral IV - Single Lumen 04/11/23 1947 20 G Left Antecubital (Active)   Site Assessment Clean;Dry;Intact;No redness;No swelling 04/12/23 0715   Extremity Assessment Distal to IV No warmth;No swelling;No redness;No abnormal discoloration 04/12/23 0715   Line Status Infusing 04/12/23 0715   Dressing Status Clean;Dry;Intact 04/12/23 0715   Dressing Intervention Integrity maintained 04/12/23 0715   Dressing Change Due 04/15/23 04/12/23 0715   Site Change Due 04/15/23 04/12/23 0715   Reason Not Rotated Not due 04/12/23 0715   Number of days: 0            Urethral Catheter 04/12/23 0345 Coude 16 Fr. (Active)   Site Assessment Clean;Intact 04/12/23 0715   Collection Container Standard drainage bag 04/12/23 0715   Securement Method secured to top of thigh w/ adhesive device 04/12/23 0715   Catheter Care Performed yes 04/12/23 0715   Reason for Continuing Urinary Catheterization Urinary retention 04/12/23 0715   CAUTI Prevention Bundle Securement Device in place with 1" slack 04/12/23 0715   Output (mL) 400 mL 04/12/23 0715   Number of days: 0       CBGs/Accuchecks: Yes     Precautions: Fall, Cardiac, Blood Pressure/Syncope, Aspiration, " Anti-Coagulation Meds, and Seizures    Restraints: No     Isolation Precautions: N/A       Facility-Administered Medications as of 4/12/2023   Medication Dose Route Frequency Provider Last Rate Last Admin    0.9%  NaCl infusion (for blood administration)   Intravenous Q24H PRN HARLEY Simmons-SONIA        0.9%  NaCl infusion   Intravenous Continuous HARLEY Simmons-C 100 mL/hr at 04/12/23 0728 New Bag at 04/12/23 0728    [COMPLETED] acetaminophen 1,000 mg/100 mL (10 mg/mL) injection 1,000 mg  1,000 mg Intravenous Q8H Tyler Rizvi MD   Stopped at 04/09/23 1538    acetaminophen tablet 650 mg  650 mg Oral Q8H PRN Tyler Rizvi MD   650 mg at 04/10/23 2020    aluminum-magnesium hydroxide-simethicone 200-200-20 mg/5 mL suspension 30 mL  30 mL Oral QID PRN Tyler Rizvi MD        aspirin tablet 325 mg  325 mg Oral BID Tyler Rizvi MD   325 mg at 04/12/23 0832    atorvastatin tablet 40 mg  40 mg Oral Daily Tyler Rizvi MD   40 mg at 04/12/23 0833    [COMPLETED] cefazolin (ANCEF) 2 gram in dextrose 5% 50 mL IVPB (premix)  2 g Intravenous Q6H Tyler Rizvi MD   Stopped at 04/09/23 0944    dextrose 10% bolus 125 mL 125 mL  12.5 g Intravenous PRN Tyler Rizvi MD        dextrose 10% bolus 250 mL 250 mL  25 g Intravenous PRN Tyler Rizvi MD        fenofibrate tablet 160 mg  160 mg Oral Daily Tyler Rizvi MD   160 mg at 04/12/23 0833    gabapentin capsule 300 mg  300 mg Oral TID Tyler Rizvi MD   300 mg at 04/12/23 0833    glucagon (human recombinant) injection 1 mg  1 mg Intramuscular PRN Tyler Rizvi MD        glucose chewable tablet 16 g  16 g Oral PRN Tyler Rizvi MD        glucose chewable tablet 24 g  24 g Oral PRN Tyler Rizvi MD        HYDROcodone-acetaminophen 5-325 mg per tablet 1 tablet  1 tablet Oral Q6H PRN Tyler Rizvi MD        levothyroxine tablet 50 mcg  50 mcg  Oral Before breakfast Tyler Rizvi MD   50 mcg at 04/11/23 0546    magnesium oxide tablet 800 mg  800 mg Oral PRN Tyler Rizvi MD        magnesium oxide tablet 800 mg  800 mg Oral PRN Tyler Rizvi MD        melatonin tablet 6 mg  6 mg Oral Nightly PRN Tyler Rizvi MD   6 mg at 04/12/23 0018    metoprolol succinate (TOPROL-XL) 24 hr tablet 25 mg  25 mg Oral Daily Tyler Rizvi MD   25 mg at 04/11/23 0816    morphine injection 2 mg  2 mg Intravenous Q4H PRN Tyler Rizvi MD   2 mg at 04/08/23 1143    mupirocin 2 % ointment   Nasal BID Corey Rust MD   Given at 04/11/23 2100    naloxone 0.4 mg/mL injection 0.02 mg  0.02 mg Intravenous PRN Tyler Rizvi MD        ondansetron injection 4 mg  4 mg Intravenous Q6H PRN Tyler Rizvi MD        polyethylene glycol packet 17 g  17 g Oral BID PRN Tyler Rizvi MD   17 g at 04/11/23 1532    potassium bicarbonate disintegrating tablet 35 mEq  35 mEq Oral PRN Tyler Rizvi MD        potassium bicarbonate disintegrating tablet 50 mEq  50 mEq Oral PRN Tyler Rizvi MD        potassium bicarbonate disintegrating tablet 60 mEq  60 mEq Oral PRN Tyler Rizvi MD        potassium, sodium phosphates 280-160-250 mg packet 2 packet  2 packet Oral PRN Ruy [place] PA-C        potassium, sodium phosphates 280-160-250 mg packet 2 packet  2 packet Oral PRN Ruy [place] PA-C   2 packet at 04/10/23 2049    potassium, sodium phosphates 280-160-250 mg packet 2 packet  2 packet Oral PRN R[place] PA-C        senna-docusate 8.6-50 mg per tablet 1 tablet  1 tablet Oral BID Tyler Rizvi MD   1 tablet at 04/12/23 0833    [COMPLETED] sodium chloride 0.9% bolus 500 mL 500 mL  500 mL Intravenous Once R[place] PA-C   Stopped at 04/09/23 1422    sodium chloride 0.9% flush 10 mL  10 mL Intravenous Q12H PRN Tyler Rizvi MD        sodium chloride  "0.9% flush 5 mL  5 mL Intravenous PRN Tyler Rizvi MD        tamsulosin 24 hr capsule 0.4 mg  0.4 mg Oral QHS Tyler Rizvi MD   0.4 mg at 04/11/23 2035    [COMPLETED] tuberculin injection 5 Units  5 Units Intradermal Once Ruy [place] PA-C   5 Units at 04/11/23 1511     Outpatient Medications as of 4/12/2023   Medication Sig Dispense Refill    aspirin (ECOTRIN) 81 MG EC tablet Take 162 mg by mouth once daily.       betamethasone dipropionate 0.05 % cream Apply topically 2 (two) times daily. 45 g 2    gabapentin (NEURONTIN) 300 MG capsule Start with 1 capsule at bedtime for 1 week then increase to 1 capsule BID. May increase further 1 capsule TID as needed for neuropathy pain 270 capsule 1        Cardiovascular:    Cardiovascular Review: Within definable limits (WDL)    Telemetry: Yes    Rhythm:  NSR     AICD: No      Respiratory:    Oxygen:  Room Air     CPT/Frequency: N/A    Incentive Spirometer/Frequency: N/A    CPAP/Settings: N/A    BiPAP/Settings: N/A    Oxygen Saturation:  98% Room Air     Suction Frequency: N/A          Nutrition:      Ht Readings from Last 3 Encounters:   04/08/23 5' 8" (1.727 m)   04/05/22 5' 8" (1.727 m)   09/30/21 5' 8" (1.727 m)     Wt Readings from Last 1 Encounters:   04/08/23 0000 70.8 kg (156 lb 1.4 oz)   04/07/23 2048 77.1 kg (170 lb)       Feeding Status:   Current Diet: regular    Supplements:N/A   Tube Feeding: N/A   Flushes: N/A      Integumentary:    Integumentary: Special bed and Surgical Incisions              Altered Skin Integrity 04/08/23 0153 Right posterior Elbow Abrasion(s) (Active)   04/08/23 0153   Altered Skin Integrity Present on Admission - Did Patient arrive to the hospital with altered skin?: yes   Side: Right   Orientation: posterior   Location: Elbow   Wound Number:    Is this injury device related?:    Primary Wound Type: Abrasion(s)   Description of Altered Skin Integrity:    Ankle-Brachial Index:    Pulses:    Removal Indication and " Assessment:    Wound Outcome:    (Retired) Wound Length (cm):    (Retired) Wound Width (cm):    (Retired) Depth (cm):    Wound Description (Comments):    Removal Indications:    Dressing Appearance Dry;Clean;Intact 04/11/23 2000   Drainage Amount None 04/10/23 0820   Appearance Dressing in place, unable to visualize 04/11/23 2000   Number of days: 4            Incision/Site 04/08/23 1502 Right Hip (Active)   04/08/23 1502   Present Prior to Hospital Arrival?:    Side: Right   Location: Hip   Orientation:    Incision Type:    Closure Method:    Additional Comments:    Removal Indication and Assessment:    Wound Outcome:    Removal Indications:    Incision WDL WDL 04/11/23 2000   Dressing Appearance Dry;Intact;Clean 04/11/23 2000   Drainage Amount None 04/10/23 0820   Appearance Dressing in place, unable to visualize 04/11/23 2000   Number of days: 4         Musculoskeletal:    Transfer assist: Minimal Assistance    Weight Bearing Status: As Tolerated    Comments:     General Precautions: Standard, fall  Orthopedic Precautions: RLE weight bearing as tolerated  Braces: N/A  Respiratory Status: Room air     Functional Mobility:  Bed Mobility:     Supine to Sit: minimum assistance  Transfers:     Sit to Stand:  minimum assistance with rolling walker  Gait: x 75 feet RW min assist    ADL Assist: Minimal Assistance    Special Equipment: cane, walker, wheelchair, and bedside commode    Radiology:    Radiology (Last 168 hours)               04/11 0912 X-Ray Chest AP Portable       04/08 1556 X-Ray Femur 2 View Right       04/08 1529 SURG FL Surgery Fluoro Usage       04/07 2258 X-Ray Femur Ap/Lat Right       04/07 2213 X-Ray Chest AP Portable       04/07 2132 X-Ray Hip 2 or 3 views Right (with Pelvis when performed)       04/07 2129 X-Ray Elbow Complete Right       04/07 0000 CARDIAC MONITORING STRIPS              X-Ray Chest AP Portable  Narrative: EXAMINATION:  XR CHEST AP PORTABLE    CLINICAL  HISTORY:  fever;    TECHNIQUE:  Single frontal view of the chest was performed.    COMPARISON:  04/07/2023    FINDINGS:  There are postop changes of CABG.  The heart size is normal.  Lung volumes are mildly low.  There is no consolidation, edema, or pleural effusion.  Impression: No acute process    Electronically signed by: Bradford Guerin MD  Date:    04/11/2023  Time:    10:02      Lab/Cultures:    BUN   Date Value Ref Range Status   04/12/2023 12 8 - 23 mg/dL Final   04/11/2023 14 8 - 23 mg/dL Final     Creatinine   Date Value Ref Range Status   04/12/2023 0.7 0.5 - 1.4 mg/dL Final   04/11/2023 0.7 0.5 - 1.4 mg/dL Final     WBC   Date Value Ref Range Status   04/12/2023 5.41 3.90 - 12.70 K/uL Final   04/11/2023 5.91 3.90 - 12.70 K/uL Final      No results found for: CULTBLD, LABBLOO, CULBFAERO, CULBFANAERO, CULTGAS, CULTMRSA, CULTSTOOL, THROATCULTA, CULTURESP, LABURIN, URINESENSE, CULWND, TCULT, RESPIRATORYC, CDIFFICILEAN, CDIFFTOX  No results for input(s): PH, PCO2, PO2, HCO3, POCSATURATED, BE in the last 72 hours.

## 2023-04-12 NOTE — PLAN OF CARE
POC/Meds reviewed, pt verbalized understanding. Vitals stable.  Tele In place-[id number]. Ivf infusing. Simental placed this shift due to retention. Up with x1 assist. Repositions self. Hourly/Q2hr rounding performed, safety maintained. Bed in lowest position, wheels locked, SR up x2, call light in easy reach. No  complaints at this time.

## 2023-04-12 NOTE — DISCHARGE INSTRUCTIONS
[facility] Facility Transfer Orders        Admit to: SNF    Diagnoses:   Active Hospital Problems    Diagnosis  POA    *Closed intertrochanteric fracture of right femur [S72.141A]  Yes    Skin tear of right elbow without complication [S51.011A]  Yes    Advanced care planning/counseling discussion [Z71.89]  Not Applicable    Normochromic normocytic anemia [D64.9]  Yes    Mixed hyperlipidemia [E78.2]  Yes    Peripheral polyneuropathy [G62.9]  Yes    Essential hypertension [I10]  Yes    Coronary artery disease involving native coronary artery of native heart without angina pectoris [I25.10]  Yes    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     Allergies: Review of patient's allergies indicates:  No Known Allergies    Code Status: DNR    Vitals: Routine       Diet: regular diet    Activity: Activity as tolerated    Nursing Precautions: Fall    Bed/Surface: Low Air Loss    Consults: PT to evaluate and treat- 3 times a week and OT to evaluate and treat- 3 times a week    Oxygen: room air    Dialysis: Patient is not on dialysis.     Labs: First blood draw on 4/13. These results to PCP.              CBL and BMP    Pending Diagnostic Studies:       None            IV Access: Peripheral     Medications: Discontinue all previous medication orders, if any. See new list below.  Current Discharge Medication List        CONTINUE these medications which have NOT CHANGED    Details   aspirin (ECOTRIN) 81 MG EC tablet Take 162 mg by mouth once daily.       betamethasone dipropionate 0.05 % cream Apply topically 2 (two) times daily.  Qty: 45 g, Refills: 2      gabapentin (NEURONTIN) 300 MG capsule Start with 1 capsule at bedtime for 1 week then increase to 1 capsule BID. May increase further 1 capsule TID as needed for neuropathy pain  Qty: 270 capsule, Refills: 1    Associated Diagnoses: Peripheral polyneuropathy      atorvastatin (LIPITOR) 40 MG tablet Take 1 tablet  (40 mg total) by mouth once daily.  Qty: 90 tablet, Refills: 1    Associated Diagnoses: Mixed hyperlipidemia      fenofibrate 160 MG Tab Take 1 tablet (160 mg total) by mouth once daily.  Qty: 90 tablet, Refills: 1    Associated Diagnoses: Mixed hyperlipidemia           STOP taking these medications       levothyroxine (SYNTHROID) 50 MCG tablet Comments:   Reason for Stopping:         metoprolol succinate (TOPROL-XL) 25 MG 24 hr tablet Comments:   Reason for Stopping:         tamsulosin (FLOMAX) 0.4 mg Cap Comments:   Reason for Stopping:             Follow up:       Immunizations Administered as of 4/12/2023       Name Date Dose VIS Date Route Exp Date    COVID-19, MRNA, LN-S, PF (Pfizer) (Purple Cap) 12/1/2021 -- -- -- --    : Pf[employer] Inc     Lot: [id number]     COVID-19, MRNA, LN-S, PF (Pfizer) (Purple Cap) 3/2/2021 11:40 AM 0.3 mL 12/12/2020 Intramuscular 6/30/2021    Site: Right deltoid     Given By: Hilary Murphy     : Pf[employer]     Lot: [id number]     COVID-19, MRNA, LN-S, PF (Pfizer) (Purple Cap) 2/9/2021 11:34 AM 0.3 mL 12/12/2020 Intramuscular 5/31/2021    Site: Right deltoid     Given By: Victorina Najera RN     : Pf[employer] Inc     Lot: [id number]             This patient has had both covid vaccinations    Some patients may experience side effects after vaccination.  These may include fever, headache, muscle or joint aches.  Most symptoms resolve with 24-48 hours and do not require urgent medical evaluation unless they persist for more than 72 hours or symptoms are concerning for an unrelated medical condition.          Ruy Bay PA-C

## 2023-04-12 NOTE — PLAN OF CARE
Patient cleared for discharge from case management standpoint.       04/12/23 1317   Final Note   Assessment Type Final Discharge Note   Anticipated Discharge Disposition SNF   What phone number can be called within the next 1-3 days to see how you are doing after discharge? [phone]   Hospital Resources/Appts/Education Provided Post-Acute resouces added to AVS;Appointments scheduled and added to AVS;Community resources provided;Provided patient/caregiver with written discharge plan information

## 2023-04-12 NOTE — CARE UPDATE
04/12/23 0724   Patient Assessment/Suction   Level of Consciousness (AVPU) alert   Respiratory Effort Unlabored;Normal   PRE-TX-O2   Device (Oxygen Therapy) room air   SpO2 97 %   Pulse Oximetry Type Intermittent   $ Pulse Oximetry - Multiple Charge Pulse Oximetry - Multiple   Pulse 63   Resp 17   Incentive Spirometer   $ Incentive Spirometer Charges done with encouragement   Incentive Spirometer Predicted Level (mL) 2000   Administration (IS) instruction provided, follow-up   Number of Repetitions (IS) 10   Level Incentive Spirometer (mL) 2000   Patient Tolerance (IS) good

## 2023-04-12 NOTE — PLAN OF CARE
MIGUELITO received call from Mona with [facility] who stated IP Rehab denied after peer to peer.  MIGUELITO sent SNF referral to [facility] [phone] for approval.  Fax confirmation received.  MIGUELITO received call from Mona with [facility] SNF auth#[id number].  Per Evon with [facility], patient accepted and they can take today.  MIGUELITO sent discharge information to [facility] via [email or web address] system.       04/12/23 1213   Post-Acute Status   Post-Acute Authorization Placement   Post-Acute Placement Status Pending post-acute provider review/more information requested

## 2023-04-12 NOTE — NURSING
Pt has not voided. Bladder scan performed. 756 noted in bladder. NP Judy notified. Orders placed for oh catheter. Coude 16f  placed. Pt tolerated well.     0428 800 output noted.

## 2023-04-12 NOTE — NURSING
Pt up to [facility]. Had a BM but no urine output. Bladder scanned and got 330cc from scan. MD notified. Said to wait a little while longer and if no output by tomorrow morning then to put a catheter back in and send him to SNF with oh in.

## 2023-04-13 NOTE — DISCHARGE SUMMARY
[facility] Medicine  Discharge Summary      Patient Name: Nimisha Longoria  MRN: [id number]  JENIFFER: [id number]  Patient Class: IP- Inpatient  Admission Date: 4/7/2023  Hospital Length of Stay: 5 days  Discharge Date and Time: 4/12/2023  5:00 PM  Attending Physician: Lois att. providers found   Discharging Provider: Ruy Bay PA-C  Primary Care Provider: Zackery Haddad MD    Primary Care Team: Networked reference to record PCT     HPI:   Mr. 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 It is severe. It is associated with right elbow pain and small skin tear to the right elbow and the inability to bear weight to the right leg. He denies fever, chills, N/V/D, preceding dizziness, head trauma, or LOC. He walks with a walker at baseline and will hold himself up with household items when not supported by the walker. He was at the counter and fell down. He tried to get up, but could not bear weight to the right leg. In the ED, he was noted to have shortening and external rotation of the right leg and right hip xray revealed an acute right intertrochanteric femur frx. Right elbow xray without any acute abnormality. Labs reveal a mild normocytic anemia Hgb 11.3/Hct 34.1. VSS. Hospital medicine is consulted for admission for further work up and treatment.       Procedure(s) (LRB):  INSERTION, INTRAMEDULLARY CHUCKY, FEMUR, synthes, hana table, 1st assist (Right)      Hospital Course:   The patient was admitted to the hospital with right hip fracture.  Patient was monitored closely throughout his hospital stay.  Ortho was consulted on admission with plan for surgery.  Patient was found to have urinary retention and Simental catheter was placed. Patient underwent right hip  repair with Dr. Bojorquez on 4/8. Patient started on prn pain medication with adequate control of pain. Pt consulted post operatively who recommended Rehab/SNF. H&H monitored throughout course of stay. He received one unit of blood prior to discharge for decreasing H&H. Case management assisted with discharge to SNF. Patient stable for discharge to [facility]. Patient to follow up with orthopedics in 2-3 weeks. Patient to follow up with primary urologist for urinary retention once discharged from SNF. Patient verbalized understanding of discharge instructions and return precautions.    Physical Exam:  General- Patient alert and oriented x3 in NAD  HEENT- PERRLA, EOMI, OP clear, MMM  Neck- No JVD, Lymphadenopathy, Thyromegaly  CV- Regular rate and rhythm, No Murmur/harrison/rubs  Resp- Lungs CTA Bilaterally, No increased WOB  GI- Non tender/non-distended, BS normoactive x4 quads, no HSM  Extrem- No cyanosis, clubbing, edema. Pulses 2+ and symmetric. Right hip dressings CDI  Neuro- Strength 5/5 flexors/extensors, DTRs 2+ and symmetric, Intact sensation to light touch grossly           Goals of Care Treatment Preferences:  Code Status: DNR      Consults:   Consults (From admission, onward)        Status Ordering Provider     Inpatient consult to Social Work/Case Management  Once        Provider:  (Not yet assigned)    Completed LELE HUYNH     Inpatient consult to Social Work/Case Management  Once        Provider:  (Not yet assigned)    Completed LELE HUYNH     IP consult case management/social work  Once        Provider:  (Not yet assigned)    Completed TYLER BOJORQUEZ     Inpatient consult to Orthopedics  Once        Provider:  Tyler Bojorquez MD    Completed MARÍA ELENA RAMOS          No new Assessment & Plan notes have been filed under this hospital service since the last note was generated.  Service: Hospital Medicine    Final Active Diagnoses:    Diagnosis Date Noted POA     PRINCIPAL PROBLEM:  Closed intertrochanteric fracture of right femur [S72.141A] 04/08/2023 Yes    Skin tear of right elbow without complication [S51.011A] 04/08/2023 Yes    Advanced care planning/counseling discussion [Z71.89] 04/08/2023 Not Applicable    Normochromic normocytic anemia [D64.9] 01/17/2021 Yes    Mixed hyperlipidemia [E78.2] 03/31/2020 Yes    Peripheral polyneuropathy [G62.9] 03/31/2020 Yes    Essential hypertension [I10] 10/03/2019 Yes    Coronary artery disease involving native coronary artery of native heart without angina pectoris [I25.10] 10/03/2019 Yes    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    Specialty: Urology  Why: hospital follow up, urinary retention  Contact information:  [address] [place] [address] [address]  [address]  [facility] [address]  [phone]             Tyler Rizvi MD Follow up in 2 week(s).    Specialty: Orthopedic Surgery  Why: hospital follow up  Contact information:  [address] [address] [id number] [phone]             [facility] SN[facility] Follow up.    Specialty: Skilled Nursing Facility  Why: SNF  Contact information:  [address] [address] [address] [phone]                     Patient Instructions:      Activity as tolerated       Significant Diagnostic Studies: Labs:   CMP   Recent Labs   Lab 04/11/23 0755 04/12/23 0353    139   K 3.7 4.0    107   CO2 26 24   * 118*   BUN 14 12   CREATININE 0.7 0.7   CALCIUM 8.6* 8.6*   PROT  --  5.3*   ALBUMIN  --  2.4*   BILITOT  --  0.7   ALKPHOS  --  46*   AST  --  15   ALT  --  5*   ANIONGAP 7* 8    and CBC   Recent Labs   Lab 04/11/23  0755 04/12/23  0353 04/12/23  0758   WBC 5.91 5.41  --    HGB 7.9* 7.6* 7.4*   HCT 24.2* 23.6*  --    * 145*  --         Pending Diagnostic Studies:     None         Medications:  Reconciled Home Medications:      Medication List      ASK your doctor about these medications    aspirin 81 MG EC tablet  Commonly known as: ECOTRIN  Take 162 mg by mouth once daily.     atorvastatin 40 MG tablet  Commonly known as: LIPITOR  Take 1 tablet (40 mg total) by mouth once daily.     betamethasone dipropionate 0.05 % cream  Apply topically 2 (two) times daily.     fenofibrate 160 MG Tab  Take 1 tablet (160 mg total) by mouth once daily.     gabapentin 300 MG capsule  Commonly known as: NEURONTIN  Start with 1 capsule at bedtime for 1 week then increase to 1 capsule BID. May increase further 1 capsule TID as needed for neuropathy pain            Indwelling Lines/Drains at time of discharge:   Lines/Drains/Airways     Drain  Duration                Urethral Catheter 04/12/23 0345 Coude 16 Fr. <1 day                Time spent on the discharge of patient: 39 minutes         Ruy Bay PA-C  Department of Hospital Medicine  [facility]

## 2023-04-14 NOTE — PHYSICIAN QUERY
PT Name: Nimisha Longoria  MR #: [id number]    DOCUMENTATION CLARIFICATION      CDS/: NOEMI Poe, RN               Contact information:[email or web address]    This form is a permanent document in the medical record.      Query Date: April 14, 2023    By submitting this query, we are merely seeking further clarification of documentation. Please utilize your independent clinical judgment when addressing the question(s) below.    The Medical Record contains the following:   Indicators  Supporting Clinical Findings Location in Medical Record    x Anemia documented Normochromic normocytic anemia  T&S   Mild  Trend CBC    Labs reveal a mild normocytic anemia Hgb 11.3/Hct 34.1.    Patient's anemia is currently controlled. Has not received any PRBCs to date..   Current CBC reviewed   Hospital Medicine H&P File Time: 04/08/2023 1:00 AM        Hospital Medicine Progress Notes 04/08/2023    Hospital Medicine Progress Notes 4/10/2023    x H&H  11.3 & 34.1 on 4/7/23     10.0 & 30.2 on 4/8/23     8.3 & 24.8 on 4/9/23     8.2 & 25.4 on 4/10/23     7.9 & 24.2 on 4/11/23     7.6 & 23.6 on 4/12/23 @ 03:53     7.4 on 4/12/23@ 07:58   Lab Results 4/7 thru 4/12/2023    x BP                    HR  BP: (117-165)/(58-73)                Pulse:  [77-83]      BP: ()/(51-77)                  Pulse:  [54-78]      BP: (101-137)/(50-61)                Pulse:  [63-99]      BP: ()/(50-65)                  Pulse:  [64-81]    Hospital Medicine Progress Notes 04/08 thru 4/11/2023    GI bleeding documented      Acute bleeding (Non GI site)      x Transfusion(s) He received one unit of blood prior to discharge for decreasing H&H   Hospital Medicine Discharge Summary File Time: 04/13/2023 8:13 AM      x Acute/Chronic illness Closed intertrochanteric fracture of right femur  Skin tear of right elbow without complication  Normochromic normocytic anemia  Mixed hyperlipidemia  Peripheral polyneuropathy  Essential hypertension  Coronary  artery disease involving native coronary artery of native heart without without angina pectoris  [facility] Hospital Medicine Discharge Summary File Time: 04/13/2023 8:13 AM       x Treatments Monitor serial CBC and transfuse if patient becomes hemodynamically unstable, symptomatic or H/H drops below 7/21.     Follow hemoglobin and hematocrit closely.       He received one unit of blood prior to discharge for decreasing H&H Hospital Medicine Progress Notes 04/08/2023      Hospital Medicine Progress Notes 4/10/2023    Hospital Medicine Discharge Summary File Time: 04/13/2023 8:13 AM      x Other PROCEDURE PERFORMED: Intramedullary nailing, right intertrochanteric femur fracture    The gluteal fascia was incised and a hematoma was evacuated.     Estimated Blood Loss: 200 cc    Hypotensive this morning. Held morning BP medication. Orthopedic Surgery Op Note 04/08/2023              Hospital Medicine Progress Notes 04/09/2023     Provider, please specify diagnosis or diagnoses associated with above clinical findings.   [   ] Acute blood loss anemia    [ X ] Acute blood loss anemia expected post-operatively    [   ] Anemia, unspecified    [   ] Other Hematological Diagnosis (please specify): _________________   [   ] Clinically Undetermined              Please document in your progress notes daily for the duration of treatment, until resolved, and include in your discharge summary.    Form No. [id number]

## 2023-04-27 ENCOUNTER — OFFICE VISIT (OUTPATIENT)
Dept: UROLOGY | Facility: CLINIC | Age: 83
End: 2023-04-27
Payer: MEDICARE

## 2023-04-27 ENCOUNTER — DOCUMENTATION ONLY (OUTPATIENT)
Dept: UROLOGY | Facility: CLINIC | Age: 83
End: 2023-04-27

## 2023-04-27 VITALS
BODY MASS INDEX: 23.86 KG/M2 | WEIGHT: 157.44 LBS | HEIGHT: 68 IN | HEART RATE: 70 BPM | DIASTOLIC BLOOD PRESSURE: 57 MMHG | SYSTOLIC BLOOD PRESSURE: 108 MMHG

## 2023-04-27 DIAGNOSIS — R33.9 URINARY RETENTION: ICD-10-CM

## 2023-04-27 DIAGNOSIS — N40.1 BENIGN PROSTATIC HYPERPLASIA WITH LOWER URINARY TRACT SYMPTOMS, SYMPTOM DETAILS UNSPECIFIED: Primary | ICD-10-CM

## 2023-04-27 DIAGNOSIS — Z97.8 FOLEY CATHETER IN PLACE PRIOR TO ARRIVAL: ICD-10-CM

## 2023-04-27 PROCEDURE — 3288F FALL RISK ASSESSMENT DOCD: CPT | Mod: CPTII,S$GLB,, | Performed by: NURSE PRACTITIONER

## 2023-04-27 PROCEDURE — 99999 PR PBB SHADOW E&M-EST. PATIENT-LVL III: CPT | Mod: PBBFAC,,, | Performed by: NURSE PRACTITIONER

## 2023-04-27 PROCEDURE — 1100F PR PT FALLS ASSESS DOC 2+ FALLS/FALL W/INJURY/YR: ICD-10-PCS | Mod: CPTII,S$GLB,, | Performed by: NURSE PRACTITIONER

## 2023-04-27 PROCEDURE — 1160F PR REVIEW ALL MEDS BY PRESCRIBER/CLIN PHARMACIST DOCUMENTED: ICD-10-PCS | Mod: CPTII,S$GLB,, | Performed by: NURSE PRACTITIONER

## 2023-04-27 PROCEDURE — 3288F PR FALLS RISK ASSESSMENT DOCUMENTED: ICD-10-PCS | Mod: CPTII,S$GLB,, | Performed by: NURSE PRACTITIONER

## 2023-04-27 PROCEDURE — 1111F DSCHRG MED/CURRENT MED MERGE: CPT | Mod: CPTII,S$GLB,, | Performed by: NURSE PRACTITIONER

## 2023-04-27 PROCEDURE — 1160F RVW MEDS BY RX/DR IN RCRD: CPT | Mod: CPTII,S$GLB,, | Performed by: NURSE PRACTITIONER

## 2023-04-27 PROCEDURE — 3078F DIAST BP <80 MM HG: CPT | Mod: CPTII,S$GLB,, | Performed by: NURSE PRACTITIONER

## 2023-04-27 PROCEDURE — 1111F PR DISCHARGE MEDS RECONCILED W/ CURRENT OUTPATIENT MED LIST: ICD-10-PCS | Mod: CPTII,S$GLB,, | Performed by: NURSE PRACTITIONER

## 2023-04-27 PROCEDURE — 99204 PR OFFICE/OUTPT VISIT, NEW, LEVL IV, 45-59 MIN: ICD-10-PCS | Mod: S$GLB,,, | Performed by: NURSE PRACTITIONER

## 2023-04-27 PROCEDURE — 1159F MED LIST DOCD IN RCRD: CPT | Mod: CPTII,S$GLB,, | Performed by: NURSE PRACTITIONER

## 2023-04-27 PROCEDURE — 1100F PTFALLS ASSESS-DOCD GE2>/YR: CPT | Mod: CPTII,S$GLB,, | Performed by: NURSE PRACTITIONER

## 2023-04-27 PROCEDURE — 1159F PR MEDICATION LIST DOCUMENTED IN MEDICAL RECORD: ICD-10-PCS | Mod: CPTII,S$GLB,, | Performed by: NURSE PRACTITIONER

## 2023-04-27 PROCEDURE — 99999 PR PBB SHADOW E&M-EST. PATIENT-LVL III: ICD-10-PCS | Mod: PBBFAC,,, | Performed by: NURSE PRACTITIONER

## 2023-04-27 PROCEDURE — 3078F PR MOST RECENT DIASTOLIC BLOOD PRESSURE < 80 MM HG: ICD-10-PCS | Mod: CPTII,S$GLB,, | Performed by: NURSE PRACTITIONER

## 2023-04-27 PROCEDURE — 3074F SYST BP LT 130 MM HG: CPT | Mod: CPTII,S$GLB,, | Performed by: NURSE PRACTITIONER

## 2023-04-27 PROCEDURE — 3074F PR MOST RECENT SYSTOLIC BLOOD PRESSURE < 130 MM HG: ICD-10-PCS | Mod: CPTII,S$GLB,, | Performed by: NURSE PRACTITIONER

## 2023-04-27 PROCEDURE — 99204 OFFICE O/P NEW MOD 45 MIN: CPT | Mod: S$GLB,,, | Performed by: NURSE PRACTITIONER

## 2023-04-27 NOTE — PROGRESS NOTES
TRANSITIONAL CARE ORDERS      2023      ORDERING MD:   Ellen Dominguez, MIKO     [facility] - UROLOGY  [facility] [place]         Patient Name: Nimisha Longoria               : 1940    [facility] Number: [id number]                  #:        Outpatient Orders/Transitional Care Nursing Orders:    PERFORM VOIDING TRIAL    REMOVE CATHETER ON 2023 IN THE MORNING BEFORE 9 AM.  HAVE PT URINATE ON HIS OWN EVERY 2HRS AND AS NEEDED.    IF BY 3PM OR 4PM ON THE SAME DAY, PT IS UNABLE TO URINATE THEN MAY PERFORM IN AND OUT CATHETER TO CHECK FOR URINE RESIDUAL.    IF URINE RESIDUAL  ML OR GREATER THEN REINSERT DUNCAN CATHETER USING 14FR OR 16FR COUDE CATHETER WITHIN THE BLADDER.    THEN CONTACT OUR UROLOGY OFFICE -[phone].    THANKS.

## 2023-04-27 NOTE — PROGRESS NOTES
"[facility] Note  Staff: JONATHAN ColemanC    PCP: ARELI Haddad    Chief Complaint: Urinary Retention-Oh catheter in place upon arrival.    Subjective:        HPI: Nimisha Longoria is a 83 y.o. male NEW PT presents today for evaluation of URINARY RETENTION.  Pt arrives into clinic today with oh catheter intact and patent at this time.  He is accompanied with his daughter during ov.    Pt resides at [facility] was admitted on 4/12/23:  This is an 84 y/o man with HTN, HLD, CAD who presented to an [facility] facility with fall with hip pain. Patient reports he fell when not using a walker. Patient presented to an [facility] facility where he underwent right femur intramedullary juan placement. Patient tolerated the procedure well. Eventually transitioned to TCU for continued PT and OT,close physician monitoring as well as management of chronic issues including HTN, HLD and CAD.  Since fracture and surgery, pt has had oh catheter in place.     Pt was evaluated once by Dr. Quiroz in 2019 for urinary incontinence.  He was last seen by PCP last month noted to be active with yardwork and still feels strong and has no complaints other than his chronically numb feet from neuropathy.  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 Cr 9/27/19 0.95. UA negative 3/20/19. PSA 2.9 in 2016 (age 76).     Has leakage of urine only after urinating - more of a post void dribble  Has some occasional urgency but denies urge incontinence  No leak with cough, sneeze  No real "leakage" but when done urinating will dribble in his pants walking away from bathroom  Now has started to wait at end of urination and double void and feels empty after the second time  Stream is weak.  No significant changes in urination since treatment for sacral chordoma  Continues to see Dr Gonsales with neurosurgery - noted tumor was pushing on colon from " inside. Had colonoscopy.   F/u with dr nam Jan 14 2020 - new mri pending  Does have some trouble with bowel movements - hard stool.  AUA SS: 17/3 (4 weak stream; 3 intermittency, urgency, straining; 2 emptying, sleeping)  No hematuria, no dysuria  Urinated prior to arrival and PV residual by bladder scan     TODAY:  Pt is currently taking Flomax 0.4 mg daily per MAR from [facility]  Simental catheter in place and intact at this time.  Pt did not have problems with urination prior to hip surgery.    REVIEW OF SYSTEMS:  A comprehensive 10 system review was performed and is negative except as noted above in HPI    PMHx:  Past Medical History:   Diagnosis Date    Abnormal SPEP 1/17/2021    Anemia     Anemia due to multiple mechanisms 1/17/2021    Anemia, chronic disease 1/17/2021    Coronary artery disease     Hyperlipidemia     Hypertension     Normochromic normocytic anemia 1/17/2021    Sacral chordoma      PSHx:  Past Surgical History:   Procedure Laterality Date    BACK SURGERY      CORONARY ARTERY BYPASS GRAFT      INTRAMEDULLARY RODDING OF FEMUR Right 4/8/2023    Procedure: INSERTION, INTRAMEDULLARY CHUCKY, FEMUR, synthes, hana table, 1st assist;  Surgeon: Tyler Rizvi MD;  Location: [facility];  Service: Orthopedics;  Laterality: Right;    SPINE SURGERY      Dr Villatoro     Allergies:  Patient has no known allergies.    Medications: reviewed   Objective:     Vitals:    04/27/23 1000   BP: (!) 108/57   Pulse: 70       General:WDWN in NAD  Eyes: PERRLA, normal conjunctiva  Respiratory: no increased work on breathing, clear to auscultation  Cardiovascular: regular rate and rhythm. No obvious extremity edema.  GI: palpation of masses. No tenderness. No hepatosplenomegaly to palpation.  Musculoskeletal: normal range of motion of bilateral upper extremities. Normal muscle strength and tone.  Skin: no obvious rashes or lesions. No tightening of skin noted.  Neurologic: CN grossly normal. Normal sensation.   Psychiatric:  awake, alert and oriented x 3. Mood and affect normal. Cooperative.      Assessment:       1. Benign prostatic hyperplasia with lower urinary tract symptoms, symptom details unspecified    2. Urinary retention    3. Ho catheter in place prior to arrival          Plan:     Voiding trial to be performed in Transitional Care Unit within the next week.  They will remove oh catheter early am, have pt urinate on his own every 2 hrs and as needed.  Should pt be unable to urinate on his own, then reinsert catheter at that time and contact our office.    F/u prn    [facility]: Active    Ellen Dominguez, FNP-C

## 2023-05-03 PROBLEM — L89.620 PRESSURE ULCER, HEEL, LEFT, UNSTAGEABLE: Status: ACTIVE | Noted: 2023-05-03

## 2023-05-08 ENCOUNTER — TELEPHONE (OUTPATIENT)
Dept: FAMILY MEDICINE | Facility: CLINIC | Age: 83
End: 2023-05-08

## 2023-05-08 DIAGNOSIS — R39.9 UTI SYMPTOMS: Primary | ICD-10-CM

## 2023-05-08 NOTE — TELEPHONE ENCOUNTER
----- Message from Bee Morgan MA sent at 5/8/2023  2:48 PM CDT -----    ----- Message -----  From: Mercedez Preston  Sent: 5/8/2023   1:38 PM CDT  To: Zackery Haddad Staff    [facility] bryson with [facility] is calling and he is having burning with urination. He had a oh cathter while in rehab and would like to collect a urine   [phone]

## 2023-05-09 ENCOUNTER — LAB VISIT (OUTPATIENT)
Dept: LAB | Facility: HOSPITAL | Age: 83
End: 2023-05-09
Payer: MEDICARE

## 2023-05-09 DIAGNOSIS — R39.9 UTI SYMPTOMS: ICD-10-CM

## 2023-05-09 LAB
BACTERIA #/AREA URNS HPF: ABNORMAL /HPF
BILIRUB UR QL STRIP: NEGATIVE
CLARITY UR: ABNORMAL
COLOR UR: YELLOW
GLUCOSE UR QL STRIP: NEGATIVE
HGB UR QL STRIP: ABNORMAL
HYALINE CASTS #/AREA URNS LPF: 2 /LPF
KETONES UR QL STRIP: NEGATIVE
LEUKOCYTE ESTERASE UR QL STRIP: ABNORMAL
MICROSCOPIC COMMENT: ABNORMAL
NITRITE UR QL STRIP: NEGATIVE
PH UR STRIP: 6 [PH] (ref 5–8)
PROT UR QL STRIP: ABNORMAL
RBC #/AREA URNS HPF: 0 /HPF (ref 0–4)
SP GR UR STRIP: 1.02 (ref 1–1.03)
SQUAMOUS #/AREA URNS HPF: 0 /HPF
URN SPEC COLLECT METH UR: ABNORMAL
UROBILINOGEN UR STRIP-ACNC: NEGATIVE EU/DL
WBC #/AREA URNS HPF: >100 /HPF (ref 0–5)

## 2023-05-09 PROCEDURE — 87086 URINE CULTURE/COLONY COUNT: CPT | Performed by: FAMILY MEDICINE

## 2023-05-09 PROCEDURE — 87077 CULTURE AEROBIC IDENTIFY: CPT | Performed by: FAMILY MEDICINE

## 2023-05-09 PROCEDURE — 87186 SC STD MICRODIL/AGAR DIL: CPT | Performed by: FAMILY MEDICINE

## 2023-05-09 PROCEDURE — 81001 URINALYSIS AUTO W/SCOPE: CPT | Performed by: FAMILY MEDICINE

## 2023-05-10 ENCOUNTER — TELEPHONE (OUTPATIENT)
Dept: FAMILY MEDICINE | Facility: CLINIC | Age: 83
End: 2023-05-10

## 2023-05-10 RX ORDER — CEFUROXIME AXETIL 500 MG/1
500 TABLET ORAL 2 TIMES DAILY
Qty: 14 TABLET | Refills: 0 | Status: SHIPPED | OUTPATIENT
Start: 2023-05-10 | End: 2023-05-17

## 2023-05-10 NOTE — TELEPHONE ENCOUNTER
Spoke to pts wife Karie, and she stated pt is having burning with urination, frequency, and pain started 2 days ago.     Urine results in labs     [facility]

## 2023-05-10 NOTE — TELEPHONE ENCOUNTER
Please let patient/wife know urine does look infected so will send for cefuroxime 500 mg twice a day for 7 days.  He needs to call us if he is not getting better.  Also make sure he is drinking plenty of fluids

## 2023-05-10 NOTE — TELEPHONE ENCOUNTER
----- Message from Bee Morgan MA sent at 5/10/2023 10:20 AM CDT -----    ----- Message -----  From: Mercedez Preston  Sent: 5/10/2023  10:17 AM CDT  To: Zackery Haddad Staff    Wife is calling and the home health nurse took a urine yesterday morning, he suffered with pain all night long and would like to know what to do   Karie [phone]

## 2023-05-11 LAB — BACTERIA UR CULT: ABNORMAL

## 2023-05-17 ENCOUNTER — DOCUMENT SCAN (OUTPATIENT)
Dept: HOME HEALTH SERVICES | Facility: HOSPITAL | Age: 83
End: 2023-05-17
Payer: MEDICARE

## 2023-05-22 ENCOUNTER — OFFICE VISIT (OUTPATIENT)
Dept: WOUND CARE | Facility: HOSPITAL | Age: 83
End: 2023-05-22
Attending: FAMILY MEDICINE
Payer: MEDICARE

## 2023-05-22 VITALS
HEART RATE: 80 BPM | SYSTOLIC BLOOD PRESSURE: 119 MMHG | DIASTOLIC BLOOD PRESSURE: 62 MMHG | RESPIRATION RATE: 18 BRPM | TEMPERATURE: 98 F

## 2023-05-22 DIAGNOSIS — L89.890 PRESSURE INJURY OF RIGHT FOOT, UNSTAGEABLE: ICD-10-CM

## 2023-05-22 DIAGNOSIS — L89.890 PRESSURE INJURY OF LEFT FOOT, UNSTAGEABLE: ICD-10-CM

## 2023-05-22 DIAGNOSIS — L89.610 PRESSURE INJURY OF RIGHT HEEL, UNSTAGEABLE: ICD-10-CM

## 2023-05-22 DIAGNOSIS — T14.8XXA ABRASION: Primary | ICD-10-CM

## 2023-05-22 DIAGNOSIS — L89.620 PRESSURE INJURY OF LEFT HEEL, UNSTAGEABLE: ICD-10-CM

## 2023-05-22 PROCEDURE — 1160F PR REVIEW ALL MEDS BY PRESCRIBER/CLIN PHARMACIST DOCUMENTED: ICD-10-PCS | Mod: CPTII,,, | Performed by: FAMILY MEDICINE

## 2023-05-22 PROCEDURE — 1159F PR MEDICATION LIST DOCUMENTED IN MEDICAL RECORD: ICD-10-PCS | Mod: CPTII,,, | Performed by: FAMILY MEDICINE

## 2023-05-22 PROCEDURE — 3078F PR MOST RECENT DIASTOLIC BLOOD PRESSURE < 80 MM HG: ICD-10-PCS | Mod: CPTII,,, | Performed by: FAMILY MEDICINE

## 2023-05-22 PROCEDURE — 1101F PR PT FALLS ASSESS DOC 0-1 FALLS W/OUT INJ PAST YR: ICD-10-PCS | Mod: CPTII,,, | Performed by: FAMILY MEDICINE

## 2023-05-22 PROCEDURE — 3074F PR MOST RECENT SYSTOLIC BLOOD PRESSURE < 130 MM HG: ICD-10-PCS | Mod: CPTII,,, | Performed by: FAMILY MEDICINE

## 2023-05-22 PROCEDURE — 3288F PR FALLS RISK ASSESSMENT DOCUMENTED: ICD-10-PCS | Mod: CPTII,,, | Performed by: FAMILY MEDICINE

## 2023-05-22 PROCEDURE — 3288F FALL RISK ASSESSMENT DOCD: CPT | Mod: CPTII,,, | Performed by: FAMILY MEDICINE

## 2023-05-22 PROCEDURE — 99214 OFFICE O/P EST MOD 30 MIN: CPT | Performed by: FAMILY MEDICINE

## 2023-05-22 PROCEDURE — 1101F PT FALLS ASSESS-DOCD LE1/YR: CPT | Mod: CPTII,,, | Performed by: FAMILY MEDICINE

## 2023-05-22 PROCEDURE — 1160F RVW MEDS BY RX/DR IN RCRD: CPT | Mod: CPTII,,, | Performed by: FAMILY MEDICINE

## 2023-05-22 PROCEDURE — 99213 OFFICE O/P EST LOW 20 MIN: CPT | Mod: ,,, | Performed by: FAMILY MEDICINE

## 2023-05-22 PROCEDURE — 3074F SYST BP LT 130 MM HG: CPT | Mod: CPTII,,, | Performed by: FAMILY MEDICINE

## 2023-05-22 PROCEDURE — 1126F PR PAIN SEVERITY QUANTIFIED, NO PAIN PRESENT: ICD-10-PCS | Mod: CPTII,,, | Performed by: FAMILY MEDICINE

## 2023-05-22 PROCEDURE — 1111F PR DISCHARGE MEDS RECONCILED W/ CURRENT OUTPATIENT MED LIST: ICD-10-PCS | Mod: CPTII,,, | Performed by: FAMILY MEDICINE

## 2023-05-22 PROCEDURE — 3078F DIAST BP <80 MM HG: CPT | Mod: CPTII,,, | Performed by: FAMILY MEDICINE

## 2023-05-22 PROCEDURE — 1126F AMNT PAIN NOTED NONE PRSNT: CPT | Mod: CPTII,,, | Performed by: FAMILY MEDICINE

## 2023-05-22 PROCEDURE — 1111F DSCHRG MED/CURRENT MED MERGE: CPT | Mod: CPTII,,, | Performed by: FAMILY MEDICINE

## 2023-05-22 PROCEDURE — 1159F MED LIST DOCD IN RCRD: CPT | Mod: CPTII,,, | Performed by: FAMILY MEDICINE

## 2023-05-22 PROCEDURE — 99213 PR OFFICE/OUTPT VISIT, EST, LEVL III, 20-29 MIN: ICD-10-PCS | Mod: ,,, | Performed by: FAMILY MEDICINE

## 2023-05-22 NOTE — PROGRESS NOTES
Wound Care Progress Note    Subjective:       Patient ID: Nimisha Longoria is a 83 y.o. male.    Chief Complaint: Wound Care    HPI  Pt seen in clinic for a FU visit for BL heel and BL foot pressure ulcers. Heels and lateral feet are unstagable and covered with eschars. Sacrum wound is an abrasion from his transfer board. Wounds are stable at this time. Pt also has pressure ulcers of the medial BL feet from blisters from shearing. No other complaints today.  Review of Systems   Skin:  Positive for wound.        Wounds of the BL feet and sacrum   All other systems reviewed and are negative.      Objective:        Physical Exam  Vitals and nursing note reviewed.   Constitutional:       General: He is not in acute distress.     Appearance: Normal appearance. He is not ill-appearing, toxic-appearing or diaphoretic.   Skin:     General: Skin is warm and dry.      Coloration: Skin is not jaundiced.      Findings: Lesion present. No bruising.      Comments: Multiple wounds of the BL feet and sacrum, see wound care assessment documentation in chart review scanned under the media tab   Neurological:      General: No focal deficit present.      Mental Status: He is alert and oriented to person, place, and time.   Psychiatric:         Mood and Affect: Mood normal.         Behavior: Behavior normal.         Thought Content: Thought content normal.         Judgment: Judgment normal.       Vitals:    05/22/23 1401   BP: 119/62   Pulse: 80   Resp: 18   Temp: 98.4 °F (36.9 °C)       Assessment:           ICD-10-CM ICD-9-CM   1. Abrasion  T14.8XXA 919.0   2. Pressure injury of left heel, unstageable  L89.620 707.07     707.25   3. Pressure injury of right heel, unstageable  L89.610 707.07     707.25   4. Pressure injury of left foot, unstageable  L89.890 707.09     707.25   5. Pressure injury of right foot, unstageable  L89.890 707.09     707.25                Plan:                  Nimisha SCOTT was seen today for wound  care.    Diagnoses and all orders for this visit:    Abrasion    Pressure injury of left heel, unstageable    Pressure injury of right heel, unstageable    Pressure injury of left foot, unstageable    Pressure injury of right foot, unstageable        Much of the documentation for this visit was completed in wound docs system. Please see the attached documentation for further details about the patients care. Scanned under the media tab.

## 2023-06-05 ENCOUNTER — OFFICE VISIT (OUTPATIENT)
Dept: WOUND CARE | Facility: HOSPITAL | Age: 83
End: 2023-06-05
Attending: FAMILY MEDICINE
Payer: MEDICARE

## 2023-06-05 VITALS
SYSTOLIC BLOOD PRESSURE: 134 MMHG | RESPIRATION RATE: 16 BRPM | TEMPERATURE: 98 F | HEART RATE: 75 BPM | DIASTOLIC BLOOD PRESSURE: 72 MMHG

## 2023-06-05 DIAGNOSIS — L89.892 PRESSURE INJURY OF DORSUM OF LEFT FOOT, STAGE 2: ICD-10-CM

## 2023-06-05 DIAGNOSIS — L89.890 PRESSURE INJURY OF RIGHT FOOT, UNSTAGEABLE: ICD-10-CM

## 2023-06-05 DIAGNOSIS — L89.42 PRESSURE INJURY OF CONTIGUOUS REGION INVOLVING BACK AND LEFT BUTTOCK, STAGE 2: Primary | ICD-10-CM

## 2023-06-05 DIAGNOSIS — L89.892 PRESSURE INJURY OF DORSUM OF RIGHT FOOT, STAGE 2: ICD-10-CM

## 2023-06-05 DIAGNOSIS — L89.620 PRESSURE INJURY OF LEFT HEEL, UNSTAGEABLE: ICD-10-CM

## 2023-06-05 PROCEDURE — 99213 PR OFFICE/OUTPT VISIT, EST, LEVL III, 20-29 MIN: ICD-10-PCS | Mod: ,,, | Performed by: FAMILY MEDICINE

## 2023-06-05 PROCEDURE — 1159F PR MEDICATION LIST DOCUMENTED IN MEDICAL RECORD: ICD-10-PCS | Mod: CPTII,,, | Performed by: FAMILY MEDICINE

## 2023-06-05 PROCEDURE — 1126F PR PAIN SEVERITY QUANTIFIED, NO PAIN PRESENT: ICD-10-PCS | Mod: CPTII,,, | Performed by: FAMILY MEDICINE

## 2023-06-05 PROCEDURE — 1160F RVW MEDS BY RX/DR IN RCRD: CPT | Mod: CPTII,,, | Performed by: FAMILY MEDICINE

## 2023-06-05 PROCEDURE — 97602 WOUND(S) CARE NON-SELECTIVE: CPT | Performed by: FAMILY MEDICINE

## 2023-06-05 PROCEDURE — 99213 OFFICE O/P EST LOW 20 MIN: CPT | Mod: ,,, | Performed by: FAMILY MEDICINE

## 2023-06-05 PROCEDURE — 3078F PR MOST RECENT DIASTOLIC BLOOD PRESSURE < 80 MM HG: ICD-10-PCS | Mod: CPTII,,, | Performed by: FAMILY MEDICINE

## 2023-06-05 PROCEDURE — 1160F PR REVIEW ALL MEDS BY PRESCRIBER/CLIN PHARMACIST DOCUMENTED: ICD-10-PCS | Mod: CPTII,,, | Performed by: FAMILY MEDICINE

## 2023-06-05 PROCEDURE — 3078F DIAST BP <80 MM HG: CPT | Mod: CPTII,,, | Performed by: FAMILY MEDICINE

## 2023-06-05 PROCEDURE — 1159F MED LIST DOCD IN RCRD: CPT | Mod: CPTII,,, | Performed by: FAMILY MEDICINE

## 2023-06-05 PROCEDURE — 3075F PR MOST RECENT SYSTOLIC BLOOD PRESS GE 130-139MM HG: ICD-10-PCS | Mod: CPTII,,, | Performed by: FAMILY MEDICINE

## 2023-06-05 PROCEDURE — 1126F AMNT PAIN NOTED NONE PRSNT: CPT | Mod: CPTII,,, | Performed by: FAMILY MEDICINE

## 2023-06-05 PROCEDURE — 3075F SYST BP GE 130 - 139MM HG: CPT | Mod: CPTII,,, | Performed by: FAMILY MEDICINE

## 2023-06-12 NOTE — PROGRESS NOTES
Wound Care Progress Note    Subjective:       Patient ID: Nimisha Longoria is a 83 y.o. male.    Chief Complaint: Wound Care, Wound Consult, and Pressure Ulcer    HPI  Pt seen in clinic for a FU visit for multiple open wounds of the BL feet and left buttock/back. Wounds aqre doing well, pt has no new complaints today  Review of Systems   Skin:  Positive for wound.        BL feet and left buttock/back open wounds   All other systems reviewed and are negative.      Objective:        Physical Exam  Vitals and nursing note reviewed.   Constitutional:       General: He is not in acute distress.     Appearance: Normal appearance. He is not ill-appearing, toxic-appearing or diaphoretic.   Skin:     Findings: Lesion present.      Comments: Multiple open wounds of the BL feet and buttocks/back on left side, see wound care assessment documentation in chart review scanned under the media tab   Neurological:      General: No focal deficit present.      Mental Status: He is alert and oriented to person, place, and time.   Psychiatric:         Mood and Affect: Mood normal.         Behavior: Behavior normal.         Thought Content: Thought content normal.         Judgment: Judgment normal.       Vitals:    06/05/23 1523   BP: 134/72   Pulse: 75   Resp: 16   Temp: 98.4 °F (36.9 °C)       Assessment:           ICD-10-CM ICD-9-CM   1. Pressure injury of contiguous region involving back and left buttock, stage 2  L89.42 707.09     707.22   2. Pressure injury of left heel, unstageable  L89.620 707.07     707.25   3. Pressure injury of right foot, unstageable  L89.890 707.09     707.25   4. Pressure injury of dorsum of left foot, stage 2  L89.892 707.09     707.22   5. Pressure injury of dorsum of right foot, stage 2  L89.892 707.09     707.22                Plan:                  Nimisha SCOTT was seen today for wound care, wound consult and pressure ulcer.    Diagnoses and all orders for this visit:    Pressure injury of  contiguous region involving back and left buttock, stage 2    Pressure injury of left heel, unstageable    Pressure injury of right foot, unstageable    Pressure injury of dorsum of left foot, stage 2    Pressure injury of dorsum of right foot, stage 2      See wound care instructions which have been provided separately.

## 2023-06-13 ENCOUNTER — DOCUMENT SCAN (OUTPATIENT)
Dept: HOME HEALTH SERVICES | Facility: HOSPITAL | Age: 83
End: 2023-06-13
Payer: MEDICARE

## 2023-06-19 ENCOUNTER — OFFICE VISIT (OUTPATIENT)
Dept: WOUND CARE | Facility: HOSPITAL | Age: 83
End: 2023-06-19
Attending: FAMILY MEDICINE
Payer: MEDICARE

## 2023-06-19 DIAGNOSIS — L89.610 PRESSURE INJURY OF RIGHT HEEL, UNSTAGEABLE: ICD-10-CM

## 2023-06-19 DIAGNOSIS — L89.892 PRESSURE INJURY OF DORSUM OF LEFT FOOT, STAGE 2: ICD-10-CM

## 2023-06-19 DIAGNOSIS — L89.890 PRESSURE INJURY OF RIGHT FOOT, UNSTAGEABLE: ICD-10-CM

## 2023-06-19 DIAGNOSIS — L89.890 PRESSURE INJURY OF LEFT FOOT, UNSTAGEABLE: ICD-10-CM

## 2023-06-19 DIAGNOSIS — L89.892 PRESSURE INJURY OF DORSUM OF RIGHT FOOT, STAGE 2: ICD-10-CM

## 2023-06-19 DIAGNOSIS — L89.42 PRESSURE INJURY OF CONTIGUOUS REGION INVOLVING BACK AND LEFT BUTTOCK, STAGE 2: ICD-10-CM

## 2023-06-19 DIAGNOSIS — T14.8XXA ABRASION: ICD-10-CM

## 2023-06-19 DIAGNOSIS — L89.890 PRESSURE INJURY OF TOE OF RIGHT FOOT, UNSTAGEABLE: ICD-10-CM

## 2023-06-19 DIAGNOSIS — L89.620 PRESSURE INJURY OF LEFT HEEL, UNSTAGEABLE: Primary | ICD-10-CM

## 2023-06-19 PROCEDURE — 99213 PR OFFICE/OUTPT VISIT, EST, LEVL III, 20-29 MIN: ICD-10-PCS | Mod: ,,, | Performed by: FAMILY MEDICINE

## 2023-06-19 PROCEDURE — 1160F RVW MEDS BY RX/DR IN RCRD: CPT | Mod: CPTII,,, | Performed by: FAMILY MEDICINE

## 2023-06-19 PROCEDURE — 99214 OFFICE O/P EST MOD 30 MIN: CPT | Performed by: FAMILY MEDICINE

## 2023-06-19 PROCEDURE — 1159F PR MEDICATION LIST DOCUMENTED IN MEDICAL RECORD: ICD-10-PCS | Mod: CPTII,,, | Performed by: FAMILY MEDICINE

## 2023-06-19 PROCEDURE — 1160F PR REVIEW ALL MEDS BY PRESCRIBER/CLIN PHARMACIST DOCUMENTED: ICD-10-PCS | Mod: CPTII,,, | Performed by: FAMILY MEDICINE

## 2023-06-19 PROCEDURE — 99213 OFFICE O/P EST LOW 20 MIN: CPT | Mod: ,,, | Performed by: FAMILY MEDICINE

## 2023-06-19 PROCEDURE — 1159F MED LIST DOCD IN RCRD: CPT | Mod: CPTII,,, | Performed by: FAMILY MEDICINE

## 2023-06-26 NOTE — PROGRESS NOTES
Wound Care Progress Note    Subjective:       Patient ID: Nimisha Longoria is a 83 y.o. male.    Chief Complaint: Wound Care    HPI  Pt seen in clinic for a FU visit for a left buttock and multiple wounds of the BL feet. All wounds are improved, and the patient has no new complaints today.  Review of Systems   Skin:  Positive for wound.        Open wounds left buttock and BL feet   All other systems reviewed and are negative.      Objective:        Physical Exam  Vitals and nursing note reviewed.   Constitutional:       Appearance: Normal appearance.   Skin:     General: Skin is warm and dry.      Findings: Lesion present.      Comments: Left buttock and BL feet wounds, see wound care assessment documentation in chart review scanned under the media tab   Neurological:      General: No focal deficit present.      Mental Status: He is alert and oriented to person, place, and time.   Psychiatric:         Mood and Affect: Mood normal.         Behavior: Behavior normal.         Judgment: Judgment normal.       There were no vitals filed for this visit.    Assessment:           ICD-10-CM ICD-9-CM   1. Pressure injury of left heel, unstageable  L89.620 707.07     707.25   2. Pressure injury of right heel, unstageable  L89.610 707.07     707.25   3. Pressure injury of dorsum of left foot, stage 2  L89.892 707.09     707.22   4. Pressure injury of right foot, unstageable  L89.890 707.09     707.25   5. Pressure injury of dorsum of right foot, stage 2  L89.892 707.09     707.22   6. Abrasion  T14.8XXA 919.0   7. Pressure injury of left foot, unstageable  L89.890 707.09     707.25   8. Pressure injury of toe of right foot, unstageable  L89.890 707.09     707.25   9. Pressure injury of contiguous region involving back and left buttock, stage 2  L89.42 707.09     707.22                Plan:                  Nimisha SCOTT was seen today for wound care.    Diagnoses and all orders for this visit:    Pressure injury of left  heel, unstageable    Pressure injury of right heel, unstageable    Pressure injury of dorsum of left foot, stage 2    Pressure injury of right foot, unstageable    Pressure injury of dorsum of right foot, stage 2    Abrasion    Pressure injury of left foot, unstageable    Pressure injury of toe of right foot, unstageable    Pressure injury of contiguous region involving back and left buttock, stage 2      See attached wound care documentation

## 2023-07-10 ENCOUNTER — OFFICE VISIT (OUTPATIENT)
Dept: WOUND CARE | Facility: HOSPITAL | Age: 83
End: 2023-07-10
Attending: FAMILY MEDICINE
Payer: MEDICARE

## 2023-07-10 VITALS
DIASTOLIC BLOOD PRESSURE: 75 MMHG | SYSTOLIC BLOOD PRESSURE: 133 MMHG | HEART RATE: 67 BPM | RESPIRATION RATE: 19 BRPM | TEMPERATURE: 99 F

## 2023-07-10 DIAGNOSIS — L89.42 PRESSURE INJURY OF CONTIGUOUS REGION INVOLVING BACK AND LEFT BUTTOCK, STAGE 2: ICD-10-CM

## 2023-07-10 DIAGNOSIS — L89.892 PRESSURE INJURY OF DORSUM OF RIGHT FOOT, STAGE 2: ICD-10-CM

## 2023-07-10 DIAGNOSIS — L89.890 PRESSURE INJURY OF RIGHT FOOT, UNSTAGEABLE: ICD-10-CM

## 2023-07-10 DIAGNOSIS — L89.890 PRESSURE INJURY OF LEFT FOOT, UNSTAGEABLE: ICD-10-CM

## 2023-07-10 DIAGNOSIS — L89.620 PRESSURE INJURY OF LEFT HEEL, UNSTAGEABLE: Primary | ICD-10-CM

## 2023-07-10 DIAGNOSIS — L89.892 PRESSURE INJURY OF DORSUM OF LEFT FOOT, STAGE 2: ICD-10-CM

## 2023-07-10 DIAGNOSIS — L89.890 PRESSURE INJURY OF TOE OF RIGHT FOOT, UNSTAGEABLE: ICD-10-CM

## 2023-07-10 DIAGNOSIS — L89.610 PRESSURE INJURY OF RIGHT HEEL, UNSTAGEABLE: ICD-10-CM

## 2023-07-10 DIAGNOSIS — T14.8XXA ABRASION: ICD-10-CM

## 2023-07-10 PROCEDURE — 1126F PR PAIN SEVERITY QUANTIFIED, NO PAIN PRESENT: ICD-10-PCS | Mod: CPTII,,, | Performed by: FAMILY MEDICINE

## 2023-07-10 PROCEDURE — 3078F PR MOST RECENT DIASTOLIC BLOOD PRESSURE < 80 MM HG: ICD-10-PCS | Mod: CPTII,,, | Performed by: FAMILY MEDICINE

## 2023-07-10 PROCEDURE — 3075F PR MOST RECENT SYSTOLIC BLOOD PRESS GE 130-139MM HG: ICD-10-PCS | Mod: CPTII,,, | Performed by: FAMILY MEDICINE

## 2023-07-10 PROCEDURE — 99213 OFFICE O/P EST LOW 20 MIN: CPT | Mod: 25,,, | Performed by: FAMILY MEDICINE

## 2023-07-10 PROCEDURE — 11042 DBRDMT SUBQ TIS 1ST 20SQCM/<: CPT | Performed by: FAMILY MEDICINE

## 2023-07-10 PROCEDURE — 1159F PR MEDICATION LIST DOCUMENTED IN MEDICAL RECORD: ICD-10-PCS | Mod: CPTII,,, | Performed by: FAMILY MEDICINE

## 2023-07-10 PROCEDURE — 1159F MED LIST DOCD IN RCRD: CPT | Mod: CPTII,,, | Performed by: FAMILY MEDICINE

## 2023-07-10 PROCEDURE — 11042 PR DEBRIDEMENT, SKIN, SUB-Q TISSUE,=<20 SQ CM: ICD-10-PCS | Mod: ,,, | Performed by: FAMILY MEDICINE

## 2023-07-10 PROCEDURE — 11045 DBRDMT SUBQ TISS EACH ADDL: CPT | Performed by: FAMILY MEDICINE

## 2023-07-10 PROCEDURE — 1126F AMNT PAIN NOTED NONE PRSNT: CPT | Mod: CPTII,,, | Performed by: FAMILY MEDICINE

## 2023-07-10 PROCEDURE — 11045 DBRDMT SUBQ TISS EACH ADDL: CPT | Mod: ,,, | Performed by: FAMILY MEDICINE

## 2023-07-10 PROCEDURE — 11042 DBRDMT SUBQ TIS 1ST 20SQCM/<: CPT | Mod: ,,, | Performed by: FAMILY MEDICINE

## 2023-07-10 PROCEDURE — 11045 PR DEB SUBQ TISSUE ADD-ON: ICD-10-PCS | Mod: ,,, | Performed by: FAMILY MEDICINE

## 2023-07-10 PROCEDURE — 99213 PR OFFICE/OUTPT VISIT, EST, LEVL III, 20-29 MIN: ICD-10-PCS | Mod: 25,,, | Performed by: FAMILY MEDICINE

## 2023-07-10 PROCEDURE — 1160F PR REVIEW ALL MEDS BY PRESCRIBER/CLIN PHARMACIST DOCUMENTED: ICD-10-PCS | Mod: CPTII,,, | Performed by: FAMILY MEDICINE

## 2023-07-10 PROCEDURE — 3078F DIAST BP <80 MM HG: CPT | Mod: CPTII,,, | Performed by: FAMILY MEDICINE

## 2023-07-10 PROCEDURE — 3075F SYST BP GE 130 - 139MM HG: CPT | Mod: CPTII,,, | Performed by: FAMILY MEDICINE

## 2023-07-10 PROCEDURE — 1160F RVW MEDS BY RX/DR IN RCRD: CPT | Mod: CPTII,,, | Performed by: FAMILY MEDICINE

## 2023-07-10 RX ORDER — COLLAGENASE SANTYL 250 [ARB'U]/G
OINTMENT TOPICAL DAILY
Qty: 30 G | Refills: 2 | Status: SHIPPED | OUTPATIENT
Start: 2023-07-10 | End: 2023-08-09

## 2023-07-11 NOTE — PROGRESS NOTES
Wound Care Progress Note    Subjective:       Patient ID: Nimisha Longoria is a 83 y.o. male.    Chief Complaint: Wound Care, Non-healing Wound Follow Up, and Pressure Ulcer    HPI  Pt seen in clinic for a FU visit for BL buttock and BL feet open wounds. Pt wounds have multiple eschars and slough covered areas. Pt is doing well otherwise. Pt has no new complaints today  Review of Systems   Skin:  Positive for wound.        BL buttock and BL feet open wounds   All other systems reviewed and are negative.      Objective:        Physical Exam  Vitals and nursing note reviewed. Exam conducted with a chaperone present.   Skin:     General: Skin is warm and dry.      Findings: Lesion present.      Comments: Multiple open wounds of the BL buttock and BL feet, see wound care assessment documentation in chart review scanned under the media tab   Psychiatric:         Mood and Affect: Mood normal.         Judgment: Judgment normal.       Vitals:    07/10/23 1656   BP: 133/75   Pulse: 67   Resp: 19   Temp: 98.5 °F (36.9 °C)       Assessment:           ICD-10-CM ICD-9-CM   1. Pressure injury of left heel, unstageable  L89.620 707.07     707.25   2. Pressure injury of right foot, unstageable  L89.890 707.09     707.25   3. Pressure injury of left foot, unstageable  L89.890 707.09     707.25   4. Pressure injury of right heel, unstageable  L89.610 707.07     707.25   5. Pressure injury of dorsum of right foot, stage 2  L89.892 707.09     707.22   6. Pressure injury of toe of right foot, unstageable  L89.890 707.09     707.25   7. Pressure injury of contiguous region involving back and left buttock, stage 2  L89.42 707.09     707.22   8. Abrasion  T14.8XXA 919.0   9. Pressure injury of dorsum of left foot, stage 2  L89.892 707.09     707.22                Plan:                  Nimisha SCOTT was seen today for wound care, non-healing wound follow up and pressure ulcer.    Diagnoses and all orders for this visit:    Pressure  injury of left heel, unstageable    Pressure injury of right foot, unstageable    Pressure injury of left foot, unstageable    Pressure injury of right heel, unstageable    Pressure injury of dorsum of right foot, stage 2    Pressure injury of toe of right foot, unstageable    Pressure injury of contiguous region involving back and left buttock, stage 2    Abrasion    Pressure injury of dorsum of left foot, stage 2    Other orders  -     collagenase (SANTYL) ointment; Apply topically once daily.      Much of the documentation for this visit was completed in the Wound Docs system.  Please see the attached documentation for further details about the patient's care. Scanned under the Media tab.

## 2023-07-17 ENCOUNTER — OFFICE VISIT (OUTPATIENT)
Dept: WOUND CARE | Facility: HOSPITAL | Age: 83
End: 2023-07-17
Attending: FAMILY MEDICINE
Payer: MEDICARE

## 2023-07-17 VITALS
DIASTOLIC BLOOD PRESSURE: 73 MMHG | HEART RATE: 67 BPM | TEMPERATURE: 98 F | RESPIRATION RATE: 20 BRPM | SYSTOLIC BLOOD PRESSURE: 143 MMHG

## 2023-07-17 DIAGNOSIS — L89.890 PRESSURE INJURY OF RIGHT FOOT, UNSTAGEABLE: ICD-10-CM

## 2023-07-17 DIAGNOSIS — L89.620 PRESSURE INJURY OF LEFT HEEL, UNSTAGEABLE: Primary | ICD-10-CM

## 2023-07-17 PROCEDURE — 11045 DBRDMT SUBQ TISS EACH ADDL: CPT | Performed by: FAMILY MEDICINE

## 2023-07-17 PROCEDURE — 11042 DBRDMT SUBQ TIS 1ST 20SQCM/<: CPT | Mod: ,,, | Performed by: FAMILY MEDICINE

## 2023-07-17 PROCEDURE — 11042 DBRDMT SUBQ TIS 1ST 20SQCM/<: CPT | Performed by: FAMILY MEDICINE

## 2023-07-17 PROCEDURE — 99499 UNLISTED E&M SERVICE: CPT | Mod: ,,, | Performed by: FAMILY MEDICINE

## 2023-07-17 PROCEDURE — 11045 DBRDMT SUBQ TISS EACH ADDL: CPT | Mod: ,,, | Performed by: FAMILY MEDICINE

## 2023-07-17 PROCEDURE — 11045 PR DEB SUBQ TISSUE ADD-ON: ICD-10-PCS | Mod: ,,, | Performed by: FAMILY MEDICINE

## 2023-07-17 PROCEDURE — 99499 NO LOS: ICD-10-PCS | Mod: ,,, | Performed by: FAMILY MEDICINE

## 2023-07-17 PROCEDURE — 11042 PR DEBRIDEMENT, SKIN, SUB-Q TISSUE,=<20 SQ CM: ICD-10-PCS | Mod: ,,, | Performed by: FAMILY MEDICINE

## 2023-07-19 NOTE — PROGRESS NOTES
Wound Care Progress Note    Subjective:       Patient ID: Nimisha Longoria is a 83 y.o. male.    Chief Complaint: Wound Care    HPI  Pt seen in clinic for a FU visit for left heel and right foot pressure ulcer. Wounds are slough covered today. Pt is doing well otherwise and he nor his wife have any new complaints or concerns today  Review of Systems   Skin:  Positive for wound.        Open wounds left heel and right foot   All other systems reviewed and are negative.      Objective:        Physical Exam  Vitals and nursing note reviewed. Exam conducted with a chaperone present.   Constitutional:       General: He is not in acute distress.     Appearance: Normal appearance. He is not toxic-appearing.   Skin:     General: Skin is warm and dry.      Findings: Lesion present.      Comments: Unstagable pressure ulcers of the left heel and right foot, see wound care assessment documentation in chart review scanned under the media tab   Neurological:      General: No focal deficit present.      Mental Status: He is alert and oriented to person, place, and time.   Psychiatric:         Mood and Affect: Mood normal.         Behavior: Behavior normal.         Thought Content: Thought content normal.         Judgment: Judgment normal.       Vitals:    07/17/23 1626   BP: (!) 143/73   Pulse: 67   Resp: 20   Temp: 98.4 °F (36.9 °C)       Assessment:           ICD-10-CM ICD-9-CM   1. Pressure injury of left heel, unstageable  L89.620 707.07     707.25   2. Pressure injury of right foot, unstageable  L89.890 707.09     707.25                Plan:                  Nimisha SCOTT was seen today for wound care.    Diagnoses and all orders for this visit:    Pressure injury of left heel, unstageable    Pressure injury of right foot, unstageable        Please see wound care instructions which have been provided separately.

## 2023-07-31 ENCOUNTER — OFFICE VISIT (OUTPATIENT)
Dept: WOUND CARE | Facility: HOSPITAL | Age: 83
End: 2023-07-31
Attending: FAMILY MEDICINE
Payer: MEDICARE

## 2023-07-31 VITALS
SYSTOLIC BLOOD PRESSURE: 124 MMHG | HEART RATE: 71 BPM | RESPIRATION RATE: 18 BRPM | TEMPERATURE: 98 F | DIASTOLIC BLOOD PRESSURE: 60 MMHG

## 2023-07-31 DIAGNOSIS — L89.42 PRESSURE INJURY OF CONTIGUOUS REGION INVOLVING BACK AND LEFT BUTTOCK, STAGE 2: ICD-10-CM

## 2023-07-31 DIAGNOSIS — L89.610 PRESSURE INJURY OF RIGHT HEEL, UNSTAGEABLE: ICD-10-CM

## 2023-07-31 DIAGNOSIS — L89.892 PRESSURE INJURY OF DORSUM OF LEFT FOOT, STAGE 2: ICD-10-CM

## 2023-07-31 DIAGNOSIS — L89.890 PRESSURE INJURY OF RIGHT FOOT, UNSTAGEABLE: ICD-10-CM

## 2023-07-31 DIAGNOSIS — L89.620 PRESSURE INJURY OF LEFT HEEL, UNSTAGEABLE: Primary | ICD-10-CM

## 2023-07-31 DIAGNOSIS — L89.890 PRESSURE INJURY OF TOE OF RIGHT FOOT, UNSTAGEABLE: ICD-10-CM

## 2023-07-31 DIAGNOSIS — L89.890 PRESSURE INJURY OF LEFT FOOT, UNSTAGEABLE: ICD-10-CM

## 2023-07-31 DIAGNOSIS — L89.892 PRESSURE INJURY OF DORSUM OF RIGHT FOOT, STAGE 2: ICD-10-CM

## 2023-07-31 PROCEDURE — 99499 UNLISTED E&M SERVICE: CPT | Mod: ,,, | Performed by: FAMILY MEDICINE

## 2023-07-31 PROCEDURE — 11042 PR DEBRIDEMENT, SKIN, SUB-Q TISSUE,=<20 SQ CM: ICD-10-PCS | Mod: ,,, | Performed by: FAMILY MEDICINE

## 2023-07-31 PROCEDURE — 99499 NO LOS: ICD-10-PCS | Mod: ,,, | Performed by: FAMILY MEDICINE

## 2023-07-31 PROCEDURE — 11042 DBRDMT SUBQ TIS 1ST 20SQCM/<: CPT | Performed by: FAMILY MEDICINE

## 2023-07-31 PROCEDURE — 11042 DBRDMT SUBQ TIS 1ST 20SQCM/<: CPT | Mod: ,,, | Performed by: FAMILY MEDICINE

## 2023-07-31 NOTE — PROGRESS NOTES
Wound Care Progress Note    Subjective:       Patient ID: Nimisha Longoria is a 83 y.o. male.    Chief Complaint: No chief complaint on file.    HPI  Pt seen in clinic for a FU visit for BL feet open wounds and left hip/buttock. Wounds are all improved with some slough. Pt has no new complaints today.   Review of Systems   Skin:  Positive for wound.        Multiple open wounds of the BL feet   All other systems reviewed and are negative.        Objective:        Physical Exam  Vitals and nursing note reviewed. Exam conducted with a chaperone present.   Constitutional:       General: He is not in acute distress.     Appearance: Normal appearance. He is not toxic-appearing.   Skin:     General: Skin is warm and dry.      Findings: Lesion present.      Comments: Left buttock and BL feet pressure ulcers, see wound care assessment documentation in chart review scanned under the media tab   Neurological:      Mental Status: He is alert.   Psychiatric:         Mood and Affect: Mood normal.         Behavior: Behavior normal.         Thought Content: Thought content normal.         Judgment: Judgment normal.       There were no vitals filed for this visit.    Assessment:           ICD-10-CM ICD-9-CM   1. Pressure injury of left heel, unstageable  L89.620 707.07     707.25   2. Pressure injury of right foot, unstageable  L89.890 707.09     707.25   3. Pressure injury of left foot, unstageable  L89.890 707.09     707.25   4. Pressure injury of right heel, unstageable  L89.610 707.07     707.25   5. Pressure injury of dorsum of right foot, stage 2  L89.892 707.09     707.22   6. Pressure injury of toe of right foot, unstageable  L89.890 707.09     707.25   7. Pressure injury of contiguous region involving back and left buttock, stage 2  L89.42 707.09     707.22   8. Pressure injury of dorsum of left foot, stage 2  L89.892 707.09     707.22                Plan:                  Diagnoses and all orders for this  visit:    Pressure injury of left heel, unstageable    Pressure injury of right foot, unstageable    Pressure injury of left foot, unstageable    Pressure injury of right heel, unstageable    Pressure injury of dorsum of right foot, stage 2    Pressure injury of toe of right foot, unstageable    Pressure injury of contiguous region involving back and left buttock, stage 2    Pressure injury of dorsum of left foot, stage 2      See wound care instructions provided separately

## 2023-08-07 ENCOUNTER — OFFICE VISIT (OUTPATIENT)
Dept: WOUND CARE | Facility: HOSPITAL | Age: 83
End: 2023-08-07
Attending: FAMILY MEDICINE
Payer: MEDICARE

## 2023-08-07 VITALS
HEART RATE: 91 BPM | TEMPERATURE: 98 F | SYSTOLIC BLOOD PRESSURE: 118 MMHG | RESPIRATION RATE: 18 BRPM | DIASTOLIC BLOOD PRESSURE: 91 MMHG

## 2023-08-07 DIAGNOSIS — L89.890 PRESSURE INJURY OF RIGHT FOOT, UNSTAGEABLE: ICD-10-CM

## 2023-08-07 DIAGNOSIS — L89.42 PRESSURE INJURY OF CONTIGUOUS REGION INVOLVING BACK AND LEFT BUTTOCK, STAGE 2: ICD-10-CM

## 2023-08-07 DIAGNOSIS — L89.892 PRESSURE INJURY OF DORSUM OF RIGHT FOOT, STAGE 2: ICD-10-CM

## 2023-08-07 DIAGNOSIS — L89.620 PRESSURE INJURY OF LEFT HEEL, UNSTAGEABLE: Primary | ICD-10-CM

## 2023-08-07 DIAGNOSIS — L89.610 PRESSURE INJURY OF RIGHT HEEL, UNSTAGEABLE: ICD-10-CM

## 2023-08-07 DIAGNOSIS — L89.890 PRESSURE INJURY OF LEFT FOOT, UNSTAGEABLE: ICD-10-CM

## 2023-08-07 PROCEDURE — 11042 DBRDMT SUBQ TIS 1ST 20SQCM/<: CPT | Mod: ,,, | Performed by: FAMILY MEDICINE

## 2023-08-07 PROCEDURE — 99499 UNLISTED E&M SERVICE: CPT | Mod: ,,, | Performed by: FAMILY MEDICINE

## 2023-08-07 PROCEDURE — 11042 DBRDMT SUBQ TIS 1ST 20SQCM/<: CPT | Performed by: FAMILY MEDICINE

## 2023-08-07 PROCEDURE — 11042 PR DEBRIDEMENT, SKIN, SUB-Q TISSUE,=<20 SQ CM: ICD-10-PCS | Mod: ,,, | Performed by: FAMILY MEDICINE

## 2023-08-07 PROCEDURE — 99499 NO LOS: ICD-10-PCS | Mod: ,,, | Performed by: FAMILY MEDICINE

## 2023-08-09 NOTE — PROGRESS NOTES
Wound Care Progress Note    Subjective:       Patient ID: Nimisha Longoria is a 83 y.o. male.    Chief Complaint: Wound Care    HPI  Pt seen in clinic for a FU visit for BLE open wounds and a stage 2 sacral pressure ulcer. Wounds are al;l improved. No new complaints today. Pt with wife at bedside.  Review of Systems   Skin:  Positive for wound.        BLE and sacrum open wounds   All other systems reviewed and are negative.      Objective:        Physical Exam  Vitals and nursing note reviewed. Exam conducted with a chaperone present.   Constitutional:       Appearance: Normal appearance.   Skin:     General: Skin is warm and dry.      Findings: Lesion present.      Comments: BLE and sacrum open wounds, see wound care assessment documentation in chart review scanned under the media tab   Neurological:      General: No focal deficit present.      Mental Status: He is alert and oriented to person, place, and time.   Psychiatric:         Mood and Affect: Mood normal.         Judgment: Judgment normal.       Vitals:    08/07/23 1512   BP: (!) 118/91   Pulse: 91   Resp: 18   Temp: 98.4 °F (36.9 °C)       Assessment:           ICD-10-CM ICD-9-CM   1. Pressure injury of left heel, unstageable  L89.620 707.07     707.25   2. Pressure injury of right foot, unstageable  L89.890 707.09     707.25   3. Pressure injury of left foot, unstageable  L89.890 707.09     707.25   4. Pressure injury of right heel, unstageable  L89.610 707.07     707.25   5. Pressure injury of dorsum of right foot, stage 2  L89.892 707.09     707.22   6. Pressure injury of contiguous region involving back and left buttock, stage 2  L89.42 707.09     707.22                Plan:                  Nimisha SCOTT was seen today for wound care.    Diagnoses and all orders for this visit:    Pressure injury of left heel, unstageable    Pressure injury of right foot, unstageable    Pressure injury of left foot, unstageable    Pressure injury of right heel,  unstageable    Pressure injury of dorsum of right foot, stage 2    Pressure injury of contiguous region involving back and left buttock, stage 2      Much of the documentation for this visit was completed in wound docs system. Please see the attached documentation for further details about the patients care. Scanned under the media tab.

## 2023-08-14 ENCOUNTER — HOSPITAL ENCOUNTER (EMERGENCY)
Facility: HOSPITAL | Age: 83
Discharge: HOME | End: 2023-08-14
Attending: EMERGENCY MEDICINE
Payer: MEDICARE

## 2023-08-14 VITALS
HEIGHT: 68 IN | DIASTOLIC BLOOD PRESSURE: 61 MMHG | SYSTOLIC BLOOD PRESSURE: 131 MMHG | HEART RATE: 83 BPM | WEIGHT: 157 LBS | TEMPERATURE: 98 F | OXYGEN SATURATION: 96 % | RESPIRATION RATE: 18 BRPM | BODY MASS INDEX: 23.79 KG/M2

## 2023-08-14 DIAGNOSIS — Z51.89 VISIT FOR WOUND CHECK: Primary | ICD-10-CM

## 2023-08-14 PROCEDURE — 25000003 PHARM REV CODE 250: Performed by: EMERGENCY MEDICINE

## 2023-08-14 PROCEDURE — 99283 EMERGENCY DEPT VISIT LOW MDM: CPT

## 2023-08-14 RX ORDER — DOXYCYCLINE HYCLATE 100 MG
100 TABLET ORAL
Status: COMPLETED | OUTPATIENT
Start: 2023-08-14 | End: 2023-08-14

## 2023-08-14 RX ORDER — DOXYCYCLINE 100 MG/1
100 CAPSULE ORAL 2 TIMES DAILY
Qty: 20 CAPSULE | Refills: 0 | Status: SHIPPED | OUTPATIENT
Start: 2023-08-14 | End: 2023-08-24

## 2023-08-14 RX ADMIN — DOXYCYCLINE HYCLATE 100 MG: 100 TABLET, COATED ORAL at 08:08

## 2023-08-15 NOTE — ED PROVIDER NOTES
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  Wife states wound care changes his bandages 3 times a week and thought his right foot looked "red" and so wanted him seen in the ER.  Wife notes that they changed his bandage quickly earlier today and she did not "get a good look at it." Sees Wound Care physician here regularly as well.  Patient denies feeling ill.  No fevers no chills.    The history is provided by the patient and the spouse.     Review of patient's allergies indicates:  No Known Allergies  Past Medical History:   Diagnosis Date    Abnormal SPEP 1/17/2021    Anemia     Anemia due to multiple mechanisms 1/17/2021    Anemia, chronic disease 1/17/2021    Coronary artery disease     Hyperlipidemia     Hypertension     Normochromic normocytic anemia 1/17/2021    Sacral chordoma      Past Surgical History:   Procedure Laterality Date    BACK SURGERY      CORONARY ARTERY BYPASS GRAFT      INTRAMEDULLARY RODDING OF FEMUR Right 4/8/2023    Procedure: INSERTION, INTRAMEDULLARY HCUCKY, FEMUR, synthes, hana table, 1st assist;  Surgeon: Tyler Rizvi MD;  Location: [facility] OR;  Service: Orthopedics;  Laterality: Right;    SPINE SURGERY      Dr Villatoro     No family history on file.  Social History     Tobacco Use    Smoking status: Former     Current packs/day: 2.00     Average packs/day: 2.0 packs/day for 25.0 years (50.0 ttl pk-yrs)     Types: Cigarettes    Smokeless tobacco: Never    Tobacco comments:     quit 1985   Substance Use Topics    Alcohol use: No    Drug use: No     Review of Systems   Constitutional:  Negative for fever.   Musculoskeletal:  Negative for arthralgias.   Skin:  Positive for wound.        Chronic sacral, both legs " "      Physical Exam     Initial Vitals [08/14/23 1751]   BP Pulse Resp Temp SpO2   131/61 83 18 97.8 °F (36.6 °C) 96 %      MAP       --         Physical Exam    Nursing note and vitals reviewed.  Constitutional: He appears well-developed and well-nourished. He is not diaphoretic.  Non-toxic appearance. He does not have a sickly appearance. He does not appear ill. No distress.   HENT:   Head: Normocephalic and atraumatic.   Eyes: EOM are normal.   Neck: Neck supple.   Normal range of motion.  Pulmonary/Chest: No respiratory distress.   Musculoskeletal:         General: Normal range of motion.      Cervical back: Normal range of motion and neck supple. No rigidity. Normal range of motion.      Right foot: No swelling.        Feet:       Comments: There is no erythema noted to the right foot     Neurological: He is alert and oriented to person, place, and time.   Skin: Skin is dry. No rash noted.   Psychiatric: He has a normal mood and affect. His behavior is normal. Judgment and thought content normal.         ED Course   Procedures  Labs Reviewed - No data to display       Imaging Results    None          Medications   doxycycline tablet 100 mg (100 mg Oral Given 8/14/23 2030)     Medical Decision Making:   History:   Old Medical Records: I decided to obtain old medical records.  Old Records Summarized: records from clinic visits.       <> Summary of Records: Wound care notes reviewed  Initial Assessment:   Foot wound  Differential Diagnosis:   Osteomyelitis, abscess, cellulitis  ED Management:  Wound care, p.o. antibiotics  83-year-old male with chronic lower extremity wounds presents for possible infected wound to the right foot.  Wife notes that his right foot has its typical appearance without any erythema.  She states earlier today the dorsum of the right foot looked "more red." I do not palpate any calor.  Wounds are well-appearing without purulent drainage.  Out of an abundance of caution I will start the " patient on p.o. doxycycline to cover for early cellulitis that might be waxing and waning at this time.  Also possibility of simple dependent edema to the dorsum of the foot.  I see no reason for labs or x-rays I doubt osteomyelitis at this time.  He gets wound care 3 times a week and follows up regularly with wound care physician.             ED Course as of 08/14/23 2043   Mon Aug 14, 2023   2010 BP: 131/61 [EF]   2010 Temp: 97.8 °F (36.6 °C) [EF]   2010 Temp Source: Oral [EF]   2010 Pulse: 83 [EF]   2010 Resp: 18 [EF]   2010 SpO2: 96 % [EF]      ED Course User Index  [EF] Reinier Jarvis MD                 Clinical Impression:   Final diagnoses:  [Z51.89] Visit for wound check (Primary)        ED Disposition Condition    Discharge Stable          ED Prescriptions       Medication Sig Dispense Start Date End Date Auth. Provider    doxycycline (VIBRAMYCIN) 100 MG Cap Take 1 capsule (100 mg total) by mouth 2 (two) times daily. for 10 days 20 capsule 8/14/2023 8/24/2023 Reinier Jarvis MD          Follow-up Information       Follow up With Specialties Details Why Contact Info Additional Information    [facility] -  Emergency Medicine  As needed, If symptoms worsen [address] [place] [address] [address] [place] [id number] 1st floor    Mundo Ashley, DO Wound Care  As scheduled [address] [facility] [address]  [place] [address]  [phone]                Reinier Jarvis MD  08/14/23 2043

## 2023-08-21 ENCOUNTER — OFFICE VISIT (OUTPATIENT)
Dept: WOUND CARE | Facility: HOSPITAL | Age: 83
End: 2023-08-21
Attending: FAMILY MEDICINE
Payer: MEDICARE

## 2023-08-21 VITALS
DIASTOLIC BLOOD PRESSURE: 71 MMHG | TEMPERATURE: 98 F | RESPIRATION RATE: 18 BRPM | SYSTOLIC BLOOD PRESSURE: 109 MMHG | HEART RATE: 64 BPM

## 2023-08-21 DIAGNOSIS — L89.620 PRESSURE INJURY OF LEFT HEEL, UNSTAGEABLE: ICD-10-CM

## 2023-08-21 DIAGNOSIS — L89.890 PRESSURE INJURY OF LEFT FOOT, UNSTAGEABLE: ICD-10-CM

## 2023-08-21 DIAGNOSIS — L89.890 PRESSURE INJURY OF RIGHT FOOT, UNSTAGEABLE: Primary | ICD-10-CM

## 2023-08-21 DIAGNOSIS — L89.892 PRESSURE INJURY OF DORSUM OF RIGHT FOOT, STAGE 2: ICD-10-CM

## 2023-08-21 DIAGNOSIS — T14.8XXA ABRASION: ICD-10-CM

## 2023-08-21 DIAGNOSIS — L89.42 PRESSURE INJURY OF CONTIGUOUS REGION INVOLVING BACK AND LEFT BUTTOCK, STAGE 2: ICD-10-CM

## 2023-08-21 DIAGNOSIS — L89.610 PRESSURE INJURY OF RIGHT HEEL, UNSTAGEABLE: ICD-10-CM

## 2023-08-21 PROCEDURE — 99499 UNLISTED E&M SERVICE: CPT | Mod: ,,, | Performed by: FAMILY MEDICINE

## 2023-08-21 PROCEDURE — 11042 DBRDMT SUBQ TIS 1ST 20SQCM/<: CPT | Performed by: FAMILY MEDICINE

## 2023-08-21 PROCEDURE — 11042 PR DEBRIDEMENT, SKIN, SUB-Q TISSUE,=<20 SQ CM: ICD-10-PCS | Mod: ,,, | Performed by: FAMILY MEDICINE

## 2023-08-21 PROCEDURE — 11042 DBRDMT SUBQ TIS 1ST 20SQCM/<: CPT | Mod: ,,, | Performed by: FAMILY MEDICINE

## 2023-08-21 PROCEDURE — 99499 NO LOS: ICD-10-PCS | Mod: ,,, | Performed by: FAMILY MEDICINE

## 2023-08-21 NOTE — PROGRESS NOTES
Wound Care Progress Note    Subjective:       Patient ID: Nimisha Longoria is a 83 y.o. male.    Chief Complaint: Wound Care    HPI  Pt seen in clinic for a FU visit for BL feet and sacral pressure ulcer. Wounds are stable with the sacrum nearly healed. Pt has no new complaints today.  Review of Systems   Skin:  Positive for wound.        BL feet and sacrum pressure ulcer   All other systems reviewed and are negative.      Objective:        Physical Exam  Vitals and nursing note reviewed. Exam conducted with a chaperone present.   Constitutional:       Appearance: Normal appearance.   Skin:     General: Skin is warm and dry.      Findings: Lesion present.      Comments: BLE and sacrum pressure ulcer, see wound care assessment documentation in chart review scanned under the media tab   Neurological:      General: No focal deficit present.      Mental Status: He is alert and oriented to person, place, and time.   Psychiatric:         Mood and Affect: Mood normal.         Judgment: Judgment normal.       Vitals:    08/21/23 1351   BP: 109/71   Pulse: 64   Resp: 18   Temp: 98.2 °F (36.8 °C)       Assessment:           ICD-10-CM ICD-9-CM   1. Pressure injury of right foot, unstageable  L89.890 707.09     707.25   2. Pressure injury of left heel, unstageable  L89.620 707.07     707.25   3. Pressure injury of left foot, unstageable  L89.890 707.09     707.25   4. Pressure injury of right heel, unstageable  L89.610 707.07     707.25   5. Pressure injury of contiguous region involving back and left buttock, stage 2  L89.42 707.09     707.22   6. Pressure injury of dorsum of right foot, stage 2  L89.892 707.09     707.22   7. Abrasion  T14.8XXA 919.0                Plan:                  Nimisha SCOTT was seen today for wound care.    Diagnoses and all orders for this visit:    Pressure injury of right foot, unstageable    Pressure injury of left heel, unstageable    Pressure injury of left foot, unstageable    Pressure  injury of right heel, unstageable    Pressure injury of contiguous region involving back and left buttock, stage 2    Pressure injury of dorsum of right foot, stage 2    Abrasion      Please see wound care instructions which have been provided separately.

## 2023-09-01 PROCEDURE — G0179 MD RECERTIFICATION HHA PT: HCPCS | Mod: ,,, | Performed by: FAMILY MEDICINE

## 2023-09-01 PROCEDURE — G0179 PR HOME HEALTH MD RECERTIFICATION: ICD-10-PCS | Mod: ,,, | Performed by: FAMILY MEDICINE

## 2023-09-06 ENCOUNTER — EXTERNAL HOME HEALTH (OUTPATIENT)
Dept: HOME HEALTH SERVICES | Facility: HOSPITAL | Age: 83
End: 2023-09-06
Payer: MEDICARE

## 2023-09-07 ENCOUNTER — OFFICE VISIT (OUTPATIENT)
Dept: WOUND CARE | Facility: HOSPITAL | Age: 83
End: 2023-09-07
Attending: FAMILY MEDICINE
Payer: MEDICARE

## 2023-09-07 VITALS
HEART RATE: 55 BPM | DIASTOLIC BLOOD PRESSURE: 67 MMHG | TEMPERATURE: 98 F | SYSTOLIC BLOOD PRESSURE: 141 MMHG | RESPIRATION RATE: 20 BRPM

## 2023-09-07 DIAGNOSIS — L89.890 PRESSURE INJURY OF RIGHT FOOT, UNSTAGEABLE: Primary | ICD-10-CM

## 2023-09-07 DIAGNOSIS — L89.610 PRESSURE INJURY OF RIGHT HEEL, UNSTAGEABLE: ICD-10-CM

## 2023-09-07 DIAGNOSIS — L89.892 PRESSURE INJURY OF DORSUM OF RIGHT FOOT, STAGE 2: ICD-10-CM

## 2023-09-07 DIAGNOSIS — L89.620 PRESSURE INJURY OF LEFT HEEL, UNSTAGEABLE: ICD-10-CM

## 2023-09-07 DIAGNOSIS — L89.890 PRESSURE INJURY OF LEFT FOOT, UNSTAGEABLE: ICD-10-CM

## 2023-09-07 PROCEDURE — 99499 UNLISTED E&M SERVICE: CPT | Mod: ,,, | Performed by: FAMILY MEDICINE

## 2023-09-07 PROCEDURE — 11042 DBRDMT SUBQ TIS 1ST 20SQCM/<: CPT | Mod: ,,, | Performed by: FAMILY MEDICINE

## 2023-09-07 PROCEDURE — 11042 DBRDMT SUBQ TIS 1ST 20SQCM/<: CPT | Performed by: FAMILY MEDICINE

## 2023-09-07 PROCEDURE — 11042 PR DEBRIDEMENT, SKIN, SUB-Q TISSUE,=<20 SQ CM: ICD-10-PCS | Mod: ,,, | Performed by: FAMILY MEDICINE

## 2023-09-07 PROCEDURE — 99499 NO LOS: ICD-10-PCS | Mod: ,,, | Performed by: FAMILY MEDICINE

## 2023-09-14 ENCOUNTER — DOCUMENT SCAN (OUTPATIENT)
Dept: HOME HEALTH SERVICES | Facility: HOSPITAL | Age: 83
End: 2023-09-14
Payer: MEDICARE

## 2023-09-14 NOTE — PROGRESS NOTES
Wound Care Progress Note    Subjective:       Patient ID: Nimisha Longoria is a 83 y.o. male.    Chief Complaint: Wound Care    HPI  Pt seen in clinic for a FU visit for BL feet pressure ulcers. Wounds are stable with some improvement. Pt denies any pain, no s/s of infection. No new complaints at this visit.  Review of Systems   Skin:  Positive for wound.        Open wound BL feet   All other systems reviewed and are negative.      Objective:        Physical Exam  Vitals and nursing note reviewed. Exam conducted with a chaperone present.   Constitutional:       General: He is not in acute distress.     Appearance: Normal appearance.   Skin:     General: Skin is warm and dry.      Findings: Lesion present.      Comments: BL feet open pressure ulcers, see wound care assessment documentation in chart review scanned under the media tab   Neurological:      General: No focal deficit present.      Mental Status: He is alert and oriented to person, place, and time.   Psychiatric:         Mood and Affect: Mood normal.         Judgment: Judgment normal.       Vitals:    09/07/23 1504   BP: (!) 141/67   Pulse: (!) 55   Resp: 20   Temp: 98.1 °F (36.7 °C)       Assessment:           ICD-10-CM ICD-9-CM   1. Pressure injury of right foot, unstageable  L89.890 707.09     707.25   2. Pressure injury of left heel, unstageable  L89.620 707.07     707.25   3. Pressure injury of left foot, unstageable  L89.890 707.09     707.25   4. Pressure injury of dorsum of right foot, stage 2  L89.892 707.09     707.22   5. Pressure injury of right heel, unstageable  L89.610 707.07     707.25                Plan:                  Nimisha SCOTT was seen today for wound care.    Diagnoses and all orders for this visit:    Pressure injury of right foot, unstageable    Pressure injury of left heel, unstageable    Pressure injury of left foot, unstageable    Pressure injury of dorsum of right foot, stage 2    Pressure injury of right heel,  unstageable        Much of the documentation for this visit was completed in the [facility] system.  Please see the attached documentation for further details about the patient's care. Scanned under the Media tab.

## 2023-09-21 ENCOUNTER — DOCUMENT SCAN (OUTPATIENT)
Dept: HOME HEALTH SERVICES | Facility: HOSPITAL | Age: 83
End: 2023-09-21
Payer: MEDICARE

## 2023-09-25 ENCOUNTER — OFFICE VISIT (OUTPATIENT)
Dept: WOUND CARE | Facility: HOSPITAL | Age: 83
End: 2023-09-25
Attending: FAMILY MEDICINE
Payer: MEDICARE

## 2023-09-25 VITALS — RESPIRATION RATE: 16 BRPM | TEMPERATURE: 99 F

## 2023-09-25 DIAGNOSIS — L89.620 PRESSURE INJURY OF LEFT HEEL, UNSTAGEABLE: ICD-10-CM

## 2023-09-25 DIAGNOSIS — L89.892 PRESSURE INJURY OF DORSUM OF RIGHT FOOT, STAGE 2: ICD-10-CM

## 2023-09-25 DIAGNOSIS — L89.890 PRESSURE INJURY OF TOE OF RIGHT FOOT, UNSTAGEABLE: ICD-10-CM

## 2023-09-25 DIAGNOSIS — L89.890 PRESSURE INJURY OF RIGHT FOOT, UNSTAGEABLE: Primary | ICD-10-CM

## 2023-09-25 DIAGNOSIS — L89.610 PRESSURE INJURY OF RIGHT HEEL, UNSTAGEABLE: ICD-10-CM

## 2023-09-25 PROCEDURE — 1160F PR REVIEW ALL MEDS BY PRESCRIBER/CLIN PHARMACIST DOCUMENTED: ICD-10-PCS | Mod: CPTII,,, | Performed by: FAMILY MEDICINE

## 2023-09-25 PROCEDURE — 11042 PR DEBRIDEMENT, SKIN, SUB-Q TISSUE,=<20 SQ CM: ICD-10-PCS | Mod: ,,, | Performed by: FAMILY MEDICINE

## 2023-09-25 PROCEDURE — 1159F PR MEDICATION LIST DOCUMENTED IN MEDICAL RECORD: ICD-10-PCS | Mod: CPTII,,, | Performed by: FAMILY MEDICINE

## 2023-09-25 PROCEDURE — 99213 OFFICE O/P EST LOW 20 MIN: CPT | Mod: 25,,, | Performed by: FAMILY MEDICINE

## 2023-09-25 PROCEDURE — 11042 DBRDMT SUBQ TIS 1ST 20SQCM/<: CPT | Mod: ,,, | Performed by: FAMILY MEDICINE

## 2023-09-25 PROCEDURE — 99213 PR OFFICE/OUTPT VISIT, EST, LEVL III, 20-29 MIN: ICD-10-PCS | Mod: 25,,, | Performed by: FAMILY MEDICINE

## 2023-09-25 PROCEDURE — 1126F PR PAIN SEVERITY QUANTIFIED, NO PAIN PRESENT: ICD-10-PCS | Mod: CPTII,,, | Performed by: FAMILY MEDICINE

## 2023-09-25 PROCEDURE — 11042 DBRDMT SUBQ TIS 1ST 20SQCM/<: CPT | Mod: PN | Performed by: FAMILY MEDICINE

## 2023-09-25 PROCEDURE — 87077 CULTURE AEROBIC IDENTIFY: CPT | Performed by: FAMILY MEDICINE

## 2023-09-25 PROCEDURE — 1160F RVW MEDS BY RX/DR IN RCRD: CPT | Mod: CPTII,,, | Performed by: FAMILY MEDICINE

## 2023-09-25 PROCEDURE — 1126F AMNT PAIN NOTED NONE PRSNT: CPT | Mod: CPTII,,, | Performed by: FAMILY MEDICINE

## 2023-09-25 PROCEDURE — 87186 SC STD MICRODIL/AGAR DIL: CPT | Mod: 59 | Performed by: FAMILY MEDICINE

## 2023-09-25 PROCEDURE — 87070 CULTURE OTHR SPECIMN AEROBIC: CPT | Performed by: FAMILY MEDICINE

## 2023-09-25 PROCEDURE — 1159F MED LIST DOCD IN RCRD: CPT | Mod: CPTII,,, | Performed by: FAMILY MEDICINE

## 2023-09-25 RX ORDER — COLLAGENASE SANTYL 250 [ARB'U]/G
OINTMENT TOPICAL DAILY
Qty: 30 G | Refills: 1 | Status: SHIPPED | OUTPATIENT
Start: 2023-09-25 | End: 2023-10-25

## 2023-09-27 LAB
BACTERIA SPEC AEROBE CULT: ABNORMAL
BACTERIA SPEC AEROBE CULT: ABNORMAL

## 2023-09-27 NOTE — PROGRESS NOTES
Wound Care Progress Note    Subjective:       Patient ID: Nimisha Longoria is a 83 y.o. male.    Chief Complaint: Wound Care and Wound Consult    HPI  Pt seen in clinic for a FU visit for BLE multiple open wounds. Wounds are all improved, pt will benefit from santyl for additional wound cleaning. No new complaints at this visit.  Review of Systems   Skin:  Positive for wound.        Multiple open wounds of the BLE   All other systems reviewed and are negative.      Objective:        Physical Exam  Vitals and nursing note reviewed. Exam conducted with a chaperone present.   Constitutional:       General: He is not in acute distress.     Appearance: Normal appearance.   Skin:     General: Skin is warm and dry.      Findings: Lesion present.      Comments: Multiple open wounds of the BLE, see wound care assessment documentation in chart review scanned under the media tab   Neurological:      General: No focal deficit present.      Mental Status: He is alert and oriented to person, place, and time.   Psychiatric:         Mood and Affect: Mood normal.         Thought Content: Thought content normal.         Judgment: Judgment normal.       Vitals:    09/25/23 1414   Resp: 16   Temp: 98.5 °F (36.9 °C)       Assessment:           ICD-10-CM ICD-9-CM   1. Pressure injury of right foot, unstageable  L89.890 707.09     707.25   2. Pressure injury of left heel, unstageable  L89.620 707.07     707.25   3. Pressure injury of toe of right foot, unstageable  L89.890 707.09     707.25   4. Pressure injury of right heel, unstageable  L89.610 707.07     707.25   5. Pressure injury of dorsum of right foot, stage 2  L89.892 707.09     707.22                Plan:                  Nimisha SCOTT was seen today for wound care and wound consult.    Diagnoses and all orders for this visit:    Pressure injury of right foot, unstageable    Pressure injury of left heel, unstageable    Pressure injury of toe of right foot,  unstageable    Pressure injury of right heel, unstageable  -     Aerobic culture    Pressure injury of dorsum of right foot, stage 2    Other orders  -     collagenase (SANTYL) ointment; Apply topically once daily. To right heel      See wound care instructions which have been provided separately.

## 2023-10-02 ENCOUNTER — OFFICE VISIT (OUTPATIENT)
Dept: WOUND CARE | Facility: HOSPITAL | Age: 83
End: 2023-10-02
Attending: FAMILY MEDICINE
Payer: MEDICARE

## 2023-10-02 VITALS
DIASTOLIC BLOOD PRESSURE: 73 MMHG | TEMPERATURE: 98 F | RESPIRATION RATE: 18 BRPM | HEART RATE: 72 BPM | SYSTOLIC BLOOD PRESSURE: 139 MMHG

## 2023-10-02 DIAGNOSIS — L89.620 PRESSURE INJURY OF LEFT HEEL, UNSTAGEABLE: ICD-10-CM

## 2023-10-02 DIAGNOSIS — L89.890 PRESSURE INJURY OF LEFT FOOT, UNSTAGEABLE: ICD-10-CM

## 2023-10-02 DIAGNOSIS — L89.890 PRESSURE INJURY OF RIGHT FOOT, UNSTAGEABLE: Primary | ICD-10-CM

## 2023-10-02 DIAGNOSIS — L89.892 PRESSURE INJURY OF DORSUM OF RIGHT FOOT, STAGE 2: ICD-10-CM

## 2023-10-02 DIAGNOSIS — L89.892 PRESSURE INJURY OF DORSUM OF LEFT FOOT, STAGE 2: ICD-10-CM

## 2023-10-02 DIAGNOSIS — L89.890 PRESSURE INJURY OF TOE OF RIGHT FOOT, UNSTAGEABLE: ICD-10-CM

## 2023-10-02 DIAGNOSIS — L89.610 PRESSURE INJURY OF RIGHT HEEL, UNSTAGEABLE: ICD-10-CM

## 2023-10-02 PROCEDURE — 11042 DBRDMT SUBQ TIS 1ST 20SQCM/<: CPT | Mod: 59,PN

## 2023-10-02 PROCEDURE — 11043 DBRDMT MUSC&/FSCA 1ST 20/<: CPT | Mod: PN | Performed by: FAMILY MEDICINE

## 2023-10-02 PROCEDURE — 11043 DBRDMT MUSC&/FSCA 1ST 20/<: CPT | Mod: ,,, | Performed by: FAMILY MEDICINE

## 2023-10-02 PROCEDURE — 11042 PR DEBRIDEMENT, SKIN, SUB-Q TISSUE,=<20 SQ CM: ICD-10-PCS | Mod: 59,,, | Performed by: FAMILY MEDICINE

## 2023-10-02 PROCEDURE — 11043 PR DEBRIDEMENT, SKIN, SUB-Q TISSUE,MUSCLE,=<20 SQ CM: ICD-10-PCS | Mod: ,,, | Performed by: FAMILY MEDICINE

## 2023-10-02 PROCEDURE — 99499 NO LOS: ICD-10-PCS | Mod: ,,, | Performed by: FAMILY MEDICINE

## 2023-10-02 PROCEDURE — 99499 UNLISTED E&M SERVICE: CPT | Mod: ,,, | Performed by: FAMILY MEDICINE

## 2023-10-02 PROCEDURE — 11042 DBRDMT SUBQ TIS 1ST 20SQCM/<: CPT | Mod: 59,,, | Performed by: FAMILY MEDICINE

## 2023-10-06 NOTE — PROGRESS NOTES
Wound Care Progress Note    Subjective:       Patient ID: Nimisha Longoria is a 83 y.o. male.    Chief Complaint: Wound Care    HPI  Pt seen in clinic for a FU visit for multiple pressure ulcers of the BLE. Pt wounds are all improved from last visit. Pt has no new complaints today.  Review of Systems   Skin:  Positive for wound.        BLE open wounds   All other systems reviewed and are negative.      Objective:        Physical Exam  Vitals and nursing note reviewed. Exam conducted with a chaperone present.   Constitutional:       General: He is not in acute distress.     Appearance: Normal appearance. He is not toxic-appearing.   Skin:     General: Skin is warm and dry.      Comments: Multiple pressure ulcers of the BLE, see wound care assessment documentation in chart review scanned under the media tab   Neurological:      General: No focal deficit present.      Mental Status: He is alert and oriented to person, place, and time.   Psychiatric:         Mood and Affect: Mood normal.         Behavior: Behavior normal.         Thought Content: Thought content normal.         Judgment: Judgment normal.       Vitals:    10/02/23 1745   BP: 139/73   Pulse: 72   Resp: 18   Temp: 98.4 °F (36.9 °C)       Assessment:           ICD-10-CM ICD-9-CM   1. Pressure injury of right foot, unstageable  L89.890 707.09     707.25   2. Pressure injury of right heel, unstageable  L89.610 707.07     707.25   3. Pressure injury of dorsum of right foot, stage 2  L89.892 707.09     707.22   4. Pressure injury of left heel, unstageable  L89.620 707.07     707.25   5. Pressure injury of toe of right foot, unstageable  L89.890 707.09     707.25   6. Pressure injury of left foot, unstageable  L89.890 707.09     707.25   7. Pressure injury of dorsum of left foot, stage 2  L89.892 707.09     707.22                Plan:                  Nimisha SCOTT was seen today for wound care.    Diagnoses and all orders for this visit:    Pressure injury  of right foot, unstageable    Pressure injury of right heel, unstageable    Pressure injury of dorsum of right foot, stage 2    Pressure injury of left heel, unstageable    Pressure injury of toe of right foot, unstageable    Pressure injury of left foot, unstageable    Pressure injury of dorsum of left foot, stage 2        Please see wound care instructions which have been provided separately.

## 2023-10-11 ENCOUNTER — TELEPHONE (OUTPATIENT)
Dept: FAMILY MEDICINE | Facility: CLINIC | Age: 83
End: 2023-10-11

## 2023-10-11 NOTE — TELEPHONE ENCOUNTER
HH nurse called back and states that's not the message that she left. States it's extremely foul smelling urine. Verbal order given for ua c+s but I let her know it should also be reported to the doctor that ordered the home health as he is being treated for a wound. We haven't seen him so we would want to make sure the fever isn't caused by anything else.

## 2023-10-12 RX ORDER — LEVOFLOXACIN 500 MG/1
500 TABLET, FILM COATED ORAL DAILY
Qty: 7 TABLET | Refills: 0 | Status: SHIPPED | OUTPATIENT
Start: 2023-10-12 | End: 2024-01-03

## 2023-10-12 NOTE — TELEPHONE ENCOUNTER
Spoke with patients wife and let her know we do not have the results back. States he really needs something now because he is still running fever. Spoke with the home health nurse at [place], they don't have the results back so unsure if he has a UTI. Checked [facility] - no preliminary results back.    Printed

## 2023-10-12 NOTE — TELEPHONE ENCOUNTER
----- Message from Mercedez Preston sent at 10/12/2023 11:55 AM CDT -----  Pt wife angy is calling to see if we have the results from his urine [facility] collected yesterday   [phone]

## 2023-10-12 NOTE — TELEPHONE ENCOUNTER
"Spoke to pt and let him know Dr. Haddad is sending in [facility]. Pt verbalized understanding     Per Dr. Haddad " [facility] once daily #7 "   "

## 2023-10-17 ENCOUNTER — TELEPHONE (OUTPATIENT)
Dept: FAMILY MEDICINE | Facility: CLINIC | Age: 83
End: 2023-10-17

## 2023-10-17 NOTE — TELEPHONE ENCOUNTER
Spoke with  nurse, we sent over [facility] on 10/12 and I spoke with the patient and his wife. Nurse states the wife was confused and she said yesterday that he was not on any abx. I let her know it was sent to [facility] on 10/12 and she is going to check there and have him start it if he hasn't already.

## 2023-10-17 NOTE — TELEPHONE ENCOUNTER
----- Message from Mercedez Preston sent at 10/17/2023 10:05 AM CDT -----  - 9:57-bryson ashley with [facility] with home health is calling about a fax they sent over for a u/a culture last week. It was to late yesterday when she saw him. She saw his culture did grow something and would like to know if we care going to call something in   [phone]

## 2023-10-23 ENCOUNTER — OFFICE VISIT (OUTPATIENT)
Dept: WOUND CARE | Facility: HOSPITAL | Age: 83
End: 2023-10-23
Attending: FAMILY MEDICINE
Payer: MEDICARE

## 2023-10-23 VITALS
DIASTOLIC BLOOD PRESSURE: 71 MMHG | HEART RATE: 73 BPM | RESPIRATION RATE: 16 BRPM | SYSTOLIC BLOOD PRESSURE: 147 MMHG | TEMPERATURE: 98 F

## 2023-10-23 DIAGNOSIS — L89.892 PRESSURE INJURY OF DORSUM OF RIGHT FOOT, STAGE 2: ICD-10-CM

## 2023-10-23 DIAGNOSIS — L89.610 PRESSURE INJURY OF RIGHT HEEL, UNSTAGEABLE: ICD-10-CM

## 2023-10-23 DIAGNOSIS — L89.890 PRESSURE INJURY OF RIGHT FOOT, UNSTAGEABLE: Primary | ICD-10-CM

## 2023-10-23 DIAGNOSIS — L89.620 PRESSURE INJURY OF LEFT HEEL, UNSTAGEABLE: ICD-10-CM

## 2023-10-23 PROCEDURE — 3077F SYST BP >= 140 MM HG: CPT | Mod: CPTII,,, | Performed by: FAMILY MEDICINE

## 2023-10-23 PROCEDURE — 3078F PR MOST RECENT DIASTOLIC BLOOD PRESSURE < 80 MM HG: ICD-10-PCS | Mod: CPTII,,, | Performed by: FAMILY MEDICINE

## 2023-10-23 PROCEDURE — 99213 OFFICE O/P EST LOW 20 MIN: CPT | Mod: 25,,, | Performed by: FAMILY MEDICINE

## 2023-10-23 PROCEDURE — 3077F PR MOST RECENT SYSTOLIC BLOOD PRESSURE >= 140 MM HG: ICD-10-PCS | Mod: CPTII,,, | Performed by: FAMILY MEDICINE

## 2023-10-23 PROCEDURE — 99213 PR OFFICE/OUTPT VISIT, EST, LEVL III, 20-29 MIN: ICD-10-PCS | Mod: 25,,, | Performed by: FAMILY MEDICINE

## 2023-10-23 PROCEDURE — 1159F MED LIST DOCD IN RCRD: CPT | Mod: CPTII,,, | Performed by: FAMILY MEDICINE

## 2023-10-23 PROCEDURE — 87070 CULTURE OTHR SPECIMN AEROBIC: CPT | Performed by: FAMILY MEDICINE

## 2023-10-23 PROCEDURE — 11043 PR DEBRIDEMENT, SKIN, SUB-Q TISSUE,MUSCLE,=<20 SQ CM: ICD-10-PCS | Mod: ,,, | Performed by: FAMILY MEDICINE

## 2023-10-23 PROCEDURE — 11043 DBRDMT MUSC&/FSCA 1ST 20/<: CPT | Mod: PN | Performed by: FAMILY MEDICINE

## 2023-10-23 PROCEDURE — 1126F AMNT PAIN NOTED NONE PRSNT: CPT | Mod: CPTII,,, | Performed by: FAMILY MEDICINE

## 2023-10-23 PROCEDURE — 1160F RVW MEDS BY RX/DR IN RCRD: CPT | Mod: CPTII,,, | Performed by: FAMILY MEDICINE

## 2023-10-23 PROCEDURE — 3078F DIAST BP <80 MM HG: CPT | Mod: CPTII,,, | Performed by: FAMILY MEDICINE

## 2023-10-23 PROCEDURE — 1160F PR REVIEW ALL MEDS BY PRESCRIBER/CLIN PHARMACIST DOCUMENTED: ICD-10-PCS | Mod: CPTII,,, | Performed by: FAMILY MEDICINE

## 2023-10-23 PROCEDURE — 11043 DBRDMT MUSC&/FSCA 1ST 20/<: CPT | Mod: ,,, | Performed by: FAMILY MEDICINE

## 2023-10-23 PROCEDURE — 1159F PR MEDICATION LIST DOCUMENTED IN MEDICAL RECORD: ICD-10-PCS | Mod: CPTII,,, | Performed by: FAMILY MEDICINE

## 2023-10-23 PROCEDURE — 1126F PR PAIN SEVERITY QUANTIFIED, NO PAIN PRESENT: ICD-10-PCS | Mod: CPTII,,, | Performed by: FAMILY MEDICINE

## 2023-10-23 NOTE — PROGRESS NOTES
Wound Care Progress Note    Subjective:       Patient ID: Nimisha Longoria is a 83 y.o. male.    Chief Complaint: Wound Care and Wound Consult    HPI  Pt seen in clinic for a FU visit for multiple open wounds of the BLE. Pt wounds are progressing, three healed, but left lateral foot has deteriorated. Pt tendon is exposed, and increased drainage it the one wound. Pt has no other complaints today  Review of Systems   Skin:  Positive for wound.        BLE open wounds   All other systems reviewed and are negative.      Objective:        Physical Exam  Vitals and nursing note reviewed. Exam conducted with a chaperone present.   Constitutional:       General: He is not in acute distress.     Appearance: Normal appearance.   Skin:     General: Skin is warm and dry.      Findings: Lesion present.      Comments: Multiple BLE open wounds, see wound care assessment documentation in chart review scanned under the media tab   Neurological:      General: No focal deficit present.      Mental Status: He is alert.   Psychiatric:         Mood and Affect: Mood normal.         Judgment: Judgment normal.       Vitals:    10/23/23 1402   BP: (!) 147/71   Pulse: 73   Resp: 16   Temp: 98.3 °F (36.8 °C)       Assessment:           ICD-10-CM ICD-9-CM   1. Pressure injury of right foot, unstageable  L89.890 707.09     707.25   2. Pressure injury of right heel, unstageable  L89.610 707.07     707.25   3. Pressure injury of dorsum of right foot, stage 2  L89.892 707.09     707.22   4. Pressure injury of left heel, unstageable  L89.620 707.07     707.25                Plan:                  Nimisha SCOTT was seen today for wound care and wound consult.    Diagnoses and all orders for this visit:    Pressure injury of right foot, unstageable    Pressure injury of right heel, unstageable    Pressure injury of dorsum of right foot, stage 2    Pressure injury of left heel, unstageable  -     Aerobic culture      See wound care instructions  which have been provided separately.

## 2023-10-25 ENCOUNTER — TELEPHONE (OUTPATIENT)
Dept: FAMILY MEDICINE | Facility: CLINIC | Age: 83
End: 2023-10-25

## 2023-10-25 LAB — BACTERIA SPEC AEROBE CULT: NORMAL

## 2023-10-25 NOTE — TELEPHONE ENCOUNTER
----- Message from Mercedez Preston sent at 10/25/2023 11:50 AM CDT -----  que with [facility] is calling for an appt with dr. Haddad and to see if he will sign [facility] orders   [phone]

## 2023-10-30 ENCOUNTER — TELEPHONE (OUTPATIENT)
Dept: FAMILY MEDICINE | Facility: CLINIC | Age: 83
End: 2023-10-30

## 2023-10-30 NOTE — TELEPHONE ENCOUNTER
----- Message from Merceedz Preston sent at 10/30/2023  2:57 PM CDT -----  -2:17-geronimo with [facility] is calling about some wounds on his feet that she thinks might be infected   [phone]

## 2023-10-30 NOTE — TELEPHONE ENCOUNTER
----- Message from Ashley Mcmullen sent at 10/30/2023  4:11 PM CDT -----   3:48 Charis with [facility] is returning Jodi's call   [phone] GH

## 2023-10-30 NOTE — TELEPHONE ENCOUNTER
Spoke to Charis and she stated pt is not going to wound care because its 175 per visit and he cants afford it. Charis stated he has 2 small wounds on both feet and a large wound on the right heel is it is pink and a little swelling with a greenish discharge. Wound , Sliver alginate and cover with ABD pads.  Spoke to pts wife and asked her if pt can come in tomorrow. She stated she thinks he can wait until the 8th and let her know the home health nurse stated it was looking bad. Pt wife is going to ask if son in law can bring pt. Pt is coming to see Dr. Haddad tomorrow

## 2023-10-31 ENCOUNTER — OFFICE VISIT (OUTPATIENT)
Dept: FAMILY MEDICINE | Facility: CLINIC | Age: 83
End: 2023-10-31
Attending: FAMILY MEDICINE
Payer: MEDICARE

## 2023-10-31 VITALS — HEART RATE: 60 BPM | DIASTOLIC BLOOD PRESSURE: 70 MMHG | SYSTOLIC BLOOD PRESSURE: 138 MMHG

## 2023-10-31 DIAGNOSIS — L89.610 PRESSURE INJURY OF RIGHT HEEL, UNSTAGEABLE: Primary | ICD-10-CM

## 2023-10-31 DIAGNOSIS — L89.892 PRESSURE INJURY OF DORSUM OF RIGHT FOOT, STAGE 2: ICD-10-CM

## 2023-10-31 DIAGNOSIS — D64.9 ANEMIA, UNSPECIFIED TYPE: ICD-10-CM

## 2023-10-31 DIAGNOSIS — I10 ESSENTIAL HYPERTENSION: ICD-10-CM

## 2023-10-31 DIAGNOSIS — I71.43 INFRARENAL ABDOMINAL AORTIC ANEURYSM (AAA) WITHOUT RUPTURE: ICD-10-CM

## 2023-10-31 DIAGNOSIS — I25.10 CORONARY ARTERY DISEASE INVOLVING NATIVE CORONARY ARTERY OF NATIVE HEART WITHOUT ANGINA PECTORIS: ICD-10-CM

## 2023-10-31 DIAGNOSIS — L89.620 PRESSURE INJURY OF LEFT HEEL, UNSTAGEABLE: ICD-10-CM

## 2023-10-31 DIAGNOSIS — D63.8 ANEMIA, CHRONIC DISEASE: ICD-10-CM

## 2023-10-31 DIAGNOSIS — G62.9 PERIPHERAL POLYNEUROPATHY: ICD-10-CM

## 2023-10-31 DIAGNOSIS — E78.2 MIXED HYPERLIPIDEMIA: ICD-10-CM

## 2023-10-31 DIAGNOSIS — L89.890 PRESSURE INJURY OF LEFT FOOT, UNSTAGEABLE: ICD-10-CM

## 2023-10-31 PROCEDURE — 99214 PR OFFICE/OUTPT VISIT, EST, LEVL IV, 30-39 MIN: ICD-10-PCS | Mod: S$GLB,,, | Performed by: FAMILY MEDICINE

## 2023-10-31 PROCEDURE — 99214 OFFICE O/P EST MOD 30 MIN: CPT | Mod: S$GLB,,, | Performed by: FAMILY MEDICINE

## 2023-10-31 PROCEDURE — 3075F PR MOST RECENT SYSTOLIC BLOOD PRESS GE 130-139MM HG: ICD-10-PCS | Mod: CPTII,S$GLB,, | Performed by: FAMILY MEDICINE

## 2023-10-31 PROCEDURE — 3288F PR FALLS RISK ASSESSMENT DOCUMENTED: ICD-10-PCS | Mod: CPTII,S$GLB,, | Performed by: FAMILY MEDICINE

## 2023-10-31 PROCEDURE — 1100F PTFALLS ASSESS-DOCD GE2>/YR: CPT | Mod: CPTII,S$GLB,, | Performed by: FAMILY MEDICINE

## 2023-10-31 PROCEDURE — 3078F DIAST BP <80 MM HG: CPT | Mod: CPTII,S$GLB,, | Performed by: FAMILY MEDICINE

## 2023-10-31 PROCEDURE — 3288F FALL RISK ASSESSMENT DOCD: CPT | Mod: CPTII,S$GLB,, | Performed by: FAMILY MEDICINE

## 2023-10-31 PROCEDURE — 1100F PR PT FALLS ASSESS DOC 2+ FALLS/FALL W/INJURY/YR: ICD-10-PCS | Mod: CPTII,S$GLB,, | Performed by: FAMILY MEDICINE

## 2023-10-31 PROCEDURE — 3075F SYST BP GE 130 - 139MM HG: CPT | Mod: CPTII,S$GLB,, | Performed by: FAMILY MEDICINE

## 2023-10-31 PROCEDURE — 1159F MED LIST DOCD IN RCRD: CPT | Mod: CPTII,S$GLB,, | Performed by: FAMILY MEDICINE

## 2023-10-31 PROCEDURE — 3078F PR MOST RECENT DIASTOLIC BLOOD PRESSURE < 80 MM HG: ICD-10-PCS | Mod: CPTII,S$GLB,, | Performed by: FAMILY MEDICINE

## 2023-10-31 PROCEDURE — 1159F PR MEDICATION LIST DOCUMENTED IN MEDICAL RECORD: ICD-10-PCS | Mod: CPTII,S$GLB,, | Performed by: FAMILY MEDICINE

## 2023-10-31 NOTE — PROGRESS NOTES
SUBJECTIVE:    Patient ID: Nimisha Longoria is a 83 y.o. male.    Chief Complaint: Wound Check (No bottles, declined vaccines, both foot wound check, abc )    83-year-old male wheelchair-bound with bilateral foot wounds.  He has no diabetes but has peripheral vascular disease.  He is had unstageable heel ulcers and foot ulcers for 8 months since his right hip fracture.  Status post ORIF.  [facility]  Also has 1 clinic nurses visiting twice a week at home to change his dressings.  Has bilateral footdrop and does not bear weight and walk well.    He states he does not have pain in his feet his right foot is rather swollen.  He takes 2 aspirin per day but no other medications.  He does have MiraLax for constipation.    He states he incontinent the, voids well on during the day treated with Levaquin this month for UTI.    He eats 2 meals per day.    Left leg has neuropathy.    He has not had lab work done in 6 months.        Office Visit on 10/23/2023   Component Date Value Ref Range Status    Aerobic Bacterial Culture 10/23/2023 Skin joão,  no predominant organism   Final   Office Visit on 09/25/2023   Component Date Value Ref Range Status    Aerobic Bacterial Culture 09/25/2023  (A)   Final                    Value:ESCHERICHIA COLI  Moderate      Aerobic Bacterial Culture 09/25/2023  (A)   Final                    Value:PSEUDOMONAS AERUGINOSA  Few     Lab Visit on 05/09/2023   Component Date Value Ref Range Status    Specimen UA 05/09/2023 Urine, Clean Catch   Final    Color, UA 05/09/2023 Yellow  Yellow, Straw, Laurel Final    Appearance, UA 05/09/2023 Hazy (A)  Clear Final    pH, UA 05/09/2023 6.0  5.0 - 8.0 Final    Specific Gravity, UA 05/09/2023 1.020  1.005 - 1.030 Final    Protein, UA 05/09/2023 1+ (A)  Negative Final    Glucose, UA 05/09/2023 Negative  Negative Final    Ketones, UA 05/09/2023 Negative  Negative Final    Bilirubin (UA) 05/09/2023 Negative  Negative Final     Occult Blood UA 05/09/2023 1+ (A)  Negative Final    Nitrite, UA 05/09/2023 Negative  Negative Final    Urobilinogen, UA 05/09/2023 Negative  Negative EU/dL Final    Leukocytes, UA 05/09/2023 3+ (A)  Negative Final    RBC, UA 05/09/2023 0  0 - 4 /hpf Final    WBC, UA 05/09/2023 >100 (H)  0 - 5 /hpf Final    Bacteria 05/09/2023 Moderate (A)  None-Occ /hpf Final    Squam Epithel, UA 05/09/2023 0  /hpf Final    Hyaline Casts, UA 05/09/2023 2 (A)  0-1/lpf /lpf Final    Microscopic Comment 05/09/2023 SEE COMMENT   Final    Urine Culture, Routine 05/09/2023  (A)   Final                    Value:ESCHERICHIA COLI  >100,000 cfu/ml     Admission on 04/12/2023, Discharged on 05/03/2023   Component Date Value Ref Range Status    TB Induration 48 - 72 hr read 04/13/2023 0  mm Final    SARS Coronavirus 2 Antigen 04/14/2023 Negative  Negative Final     Acceptable 04/14/2023 Yes   Final    WBC 04/21/2023 6.71  3.90 - 12.70 K/uL Final    RBC 04/21/2023 2.99 (L)  4.60 - 6.20 M/uL Final    Hemoglobin 04/21/2023 8.8 (L)  14.0 - 18.0 g/dL Final    Hematocrit 04/21/2023 27.6 (L)  40.0 - 54.0 % Final    MCV 04/21/2023 92  82 - 98 fL Final    MCH 04/21/2023 29.4  27.0 - 31.0 pg Final    MCHC 04/21/2023 31.9 (L)  32.0 - 36.0 g/dL Final    RDW 04/21/2023 12.8  11.5 - 14.5 % Final    Platelets 04/21/2023 226  150 - 450 K/uL Final    MPV 04/21/2023 11.3  9.2 - 12.9 fL Final    Immature Granulocytes 04/21/2023 0.7 (H)  0.0 - 0.5 % Final    Gran # (ANC) 04/21/2023 5.1  1.8 - 7.7 K/uL Final    Immature Grans (Abs) 04/21/2023 0.05 (H)  0.00 - 0.04 K/uL Final    Lymph # 04/21/2023 0.8 (L)  1.0 - 4.8 K/uL Final    Mono # 04/21/2023 0.6  0.3 - 1.0 K/uL Final    Eos # 04/21/2023 0.2  0.0 - 0.5 K/uL Final    Baso # 04/21/2023 0.03  0.00 - 0.20 K/uL Final    nRBC 04/21/2023 0  0 /100 WBC Final    Gran % 04/21/2023 76.5 (H)  38.0 - 73.0 % Final    Lymph % 04/21/2023 11.3 (L)  18.0 - 48.0 % Final    Mono % 04/21/2023 8.3  4.0 - 15.0 % Final     Eosinophil % 04/21/2023 2.8  0.0 - 8.0 % Final    Basophil % 04/21/2023 0.4  0.0 - 1.9 % Final    Differential Method 04/21/2023 Automated   Final    Sodium 04/21/2023 140  136 - 145 mmol/L Final    Potassium 04/21/2023 4.0  3.5 - 5.1 mmol/L Final    Chloride 04/21/2023 105  95 - 110 mmol/L Final    CO2 04/21/2023 27  23 - 29 mmol/L Final    Glucose 04/21/2023 93  70 - 110 mg/dL Final    BUN 04/21/2023 25 (H)  8 - 23 mg/dL Final    Creatinine 04/21/2023 0.8  0.5 - 1.4 mg/dL Final    Calcium 04/21/2023 8.9  8.7 - 10.5 mg/dL Final    Total Protein 04/21/2023 5.7 (L)  6.0 - 8.4 g/dL Final    Albumin 04/21/2023 2.4 (L)  3.5 - 5.2 g/dL Final    Total Bilirubin 04/21/2023 0.4  0.1 - 1.0 mg/dL Final    Alkaline Phosphatase 04/21/2023 97  55 - 135 U/L Final    AST 04/21/2023 22  10 - 40 U/L Final    ALT 04/21/2023 17  10 - 44 U/L Final    Anion Gap 04/21/2023 8  8 - 16 mmol/L Final    eGFR 04/21/2023 >60  >60 mL/min/1.73 m^2 Final    WBC 04/25/2023 8.03  3.90 - 12.70 K/uL Final    RBC 04/25/2023 3.33 (L)  4.60 - 6.20 M/uL Final    Hemoglobin 04/25/2023 9.5 (L)  14.0 - 18.0 g/dL Final    Hematocrit 04/25/2023 30.8 (L)  40.0 - 54.0 % Final    MCV 04/25/2023 93  82 - 98 fL Final    MCH 04/25/2023 28.5  27.0 - 31.0 pg Final    MCHC 04/25/2023 30.8 (L)  32.0 - 36.0 g/dL Final    RDW 04/25/2023 13.1  11.5 - 14.5 % Final    Platelets 04/25/2023 264  150 - 450 K/uL Final    MPV 04/25/2023 10.4  9.2 - 12.9 fL Final    Immature Granulocytes 04/25/2023 1.2 (H)  0.0 - 0.5 % Final    Gran # (ANC) 04/25/2023 6.2  1.8 - 7.7 K/uL Final    Immature Grans (Abs) 04/25/2023 0.10 (H)  0.00 - 0.04 K/uL Final    Lymph # 04/25/2023 0.9 (L)  1.0 - 4.8 K/uL Final    Mono # 04/25/2023 0.6  0.3 - 1.0 K/uL Final    Eos # 04/25/2023 0.2  0.0 - 0.5 K/uL Final    Baso # 04/25/2023 0.04  0.00 - 0.20 K/uL Final    nRBC 04/25/2023 0  0 /100 WBC Final    Gran % 04/25/2023 76.7 (H)  38.0 - 73.0 % Final    Lymph % 04/25/2023 10.7 (L)  18.0 - 48.0 % Final     Mono % 04/25/2023 8.0  4.0 - 15.0 % Final    Eosinophil % 04/25/2023 2.9  0.0 - 8.0 % Final    Basophil % 04/25/2023 0.5  0.0 - 1.9 % Final    Differential Method 04/25/2023 Automated   Final    Sodium 04/25/2023 140  136 - 145 mmol/L Final    Potassium 04/25/2023 4.3  3.5 - 5.1 mmol/L Final    Chloride 04/25/2023 105  95 - 110 mmol/L Final    CO2 04/25/2023 27  23 - 29 mmol/L Final    Glucose 04/25/2023 90  70 - 110 mg/dL Final    BUN 04/25/2023 32 (H)  8 - 23 mg/dL Final    Creatinine 04/25/2023 0.8  0.5 - 1.4 mg/dL Final    Calcium 04/25/2023 9.0  8.7 - 10.5 mg/dL Final    Total Protein 04/25/2023 6.0  6.0 - 8.4 g/dL Final    Albumin 04/25/2023 2.7 (L)  3.5 - 5.2 g/dL Final    Total Bilirubin 04/25/2023 0.3  0.1 - 1.0 mg/dL Final    Alkaline Phosphatase 04/25/2023 161 (H)  55 - 135 U/L Final    AST 04/25/2023 15  10 - 40 U/L Final    ALT 04/25/2023 13  10 - 44 U/L Final    Anion Gap 04/25/2023 8  8 - 16 mmol/L Final    eGFR 04/25/2023 >60  >60 mL/min/1.73 m^2 Final    Procalcitonin 04/25/2023 0.13  <0.25 ng/mL Final       Past Medical History:   Diagnosis Date    Abnormal SPEP 1/17/2021    Anemia     Anemia due to multiple mechanisms 1/17/2021    Anemia, chronic disease 1/17/2021    Coronary artery disease     Hyperlipidemia     Hypertension     Normochromic normocytic anemia 1/17/2021    Sacral chordoma      Social History     Socioeconomic History    Marital status:    Tobacco Use    Smoking status: Former     Current packs/day: 2.00     Average packs/day: 2.0 packs/day for 25.0 years (50.0 ttl pk-yrs)     Types: Cigarettes    Smokeless tobacco: Never    Tobacco comments:     quit 1985   Substance and Sexual Activity    Alcohol use: No    Drug use: No     Social Determinants of Health     Financial Resource Strain: Low Risk  (4/13/2023)    Overall Financial Resource Strain (CARDIA)     Difficulty of Paying Living Expenses: Not hard at all   Food Insecurity: No Food Insecurity (4/13/2023)    Hunger Vital  Sign     Worried About Running Out of Food in the Last Year: Never true     Ran Out of Food in the Last Year: Never true   Transportation Needs: No Transportation Needs (4/13/2023)    PRAPARE - Transportation     Lack of Transportation (Medical): No     Lack of Transportation (Non-Medical): No   Stress: No Stress Concern Present (4/13/2023)    [employer] [employer] - Occupational Stress Questionnaire     Feeling of Stress : Not at all   Social Connections: Moderately Integrated (4/13/2023)    Social Connection and Isolation Panel [NHANES]     Frequency of Communication with Friends and Family: Twice a week     Frequency of Social Gatherings with Friends and Family: Twice a week     Attends [employer] Services: Never     Active Member of Clubs or Organizations: Yes     Attends Club or Organization Meetings: Never     Marital Status:    Housing Stability: Low Risk  (4/13/2023)    Housing Stability Vital Sign     Unable to Pay for Housing in the Last Year: No     Number of Places Lived in the Last Year: 1     Unstable Housing in the Last Year: No     Past Surgical History:   Procedure Laterality Date    BACK SURGERY      CORONARY ARTERY BYPASS GRAFT      INTRAMEDULLARY RODDING OF FEMUR Right 4/8/2023    Procedure: INSERTION, INTRAMEDULLARY CHUCKY, FEMUR, synthes, hana table, 1st assist;  Surgeon: Tyler Rizvi MD;  Location: [facility];  Service: Orthopedics;  Laterality: Right;    SPINE SURGERY      Dr Villatoro     No family history on file.    The CVD Risk score (D'Agostino, et al., 2008) failed to calculate for the following reasons:    The 2008 CVD risk score is only valid for ages 30 to 74    Tests to Keep You Healthy    Last Blood Pressure <= 139/89 (10/31/2023): NO      Review of patient's allergies indicates:  No Known Allergies    Current Outpatient Medications:     aspirin (ECOTRIN) 81 MG EC tablet, Take 2 tablets (162 mg total) by mouth once daily. Resume May 13, after   BID course finishes, Disp: , Rfl:     betamethasone dipropionate 0.05 % cream, Apply topically 2 (two) times daily., Disp: 45 g, Rfl: 2    gabapentin (NEURONTIN) 300 MG capsule, Take 1 capsule (300 mg total) by mouth 3 (three) times daily., Disp: , Rfl:     HYDROcodone-acetaminophen (NORCO) 5-325 mg per tablet, Take 1 tablet by mouth every 6 (six) hours as needed for Pain., Disp: 10 tablet, Rfl: 0    levoFLOXacin (LEVAQUIN) 500 MG tablet, Take 1 tablet (500 mg total) by mouth once daily., Disp: 7 tablet, Rfl: 0    levothyroxine (SYNTHROID) 50 MCG tablet, Take 1 tablet (50 mcg total) by mouth before breakfast. First thing in the morning before breakfast. For thyroid., Disp: 30 tablet, Rfl: 0    metoprolol succinate (TOPROL-XL) 25 MG 24 hr tablet, Take 1 tablet (25 mg total) by mouth once daily., Disp: 30 tablet, Rfl: 0    senna-docusate 8.6-50 mg (PERICOLACE) 8.6-50 mg per tablet, Take 1 tablet by mouth every 12 (twelve) hours as needed for Constipation., Disp: 60 tablet, Rfl: 0    tamsulosin (FLOMAX) 0.4 mg Cap, Take 1 capsule (0.4 mg total) by mouth every evening., Disp: 30 capsule, Rfl: 0    atorvastatin (LIPITOR) 40 MG tablet, Take 1 tablet (40 mg total) by mouth once daily., Disp: 90 tablet, Rfl: 1    fenofibrate 160 MG Tab, Take 1 tablet (160 mg total) by mouth once daily., Disp: 90 tablet, Rfl: 1    Review of Systems   Constitutional:  Negative for appetite change, chills, fatigue, fever and unexpected weight change.   HENT:  Negative for ear pain and trouble swallowing.    Eyes:  Negative for pain, discharge and visual disturbance.   Respiratory:  Negative for apnea, cough, shortness of breath and wheezing.    Cardiovascular:  Negative for chest pain and leg swelling.   Gastrointestinal:  Negative for abdominal pain, blood in stool, constipation, diarrhea, nausea, vomiting and reflux.   Endocrine: Negative for cold intolerance, heat intolerance and polydipsia.   Genitourinary:  Negative for bladder  incontinence, dysuria, erectile dysfunction, frequency, hematuria, testicular pain and urgency.   Musculoskeletal:  Negative for gait problem, joint swelling and myalgias.   Integumentary:  Positive for wound (bilateral foot ulcers, pressure related).   Neurological:  Positive for numbness (neuropathy of the left leg). Negative for dizziness and seizures.   Psychiatric/Behavioral:  Negative for agitation, behavioral problems and hallucinations. The patient is not nervous/anxious.            Objective:      Vitals:    10/31/23 1004   BP: 138/70   Pulse: 60     Physical Exam  Vitals and nursing note reviewed.   Constitutional:       General: He is not in acute distress.     Appearance: He is well-developed. He is not toxic-appearing.      Comments: Frail elderly male sitting in a wheelchair.   HENT:      Head: Normocephalic and atraumatic.      Right Ear: Tympanic membrane and external ear normal.      Left Ear: External ear normal. There is impacted cerumen.      Nose: Nose normal.      Mouth/Throat:      Pharynx: Oropharynx is clear.   Eyes:      Pupils: Pupils are equal, round, and reactive to light.   Neck:      Thyroid: No thyromegaly.      Vascular: No carotid bruit.   Cardiovascular:      Rate and Rhythm: Normal rate and regular rhythm.      Heart sounds: Normal heart sounds. No murmur heard.  Pulmonary:      Effort: Pulmonary effort is normal.      Breath sounds: Normal breath sounds. No wheezing or rales.   Abdominal:      General: Bowel sounds are normal. There is no distension.      Palpations: Abdomen is soft.      Tenderness: There is no abdominal tenderness.   Musculoskeletal:         General: No tenderness or deformity. Normal range of motion.      Cervical back: Normal range of motion and neck supple.      Lumbar back: Normal. No spasms.      Comments: Bends 90 degrees at  waist shoulders have good range of motion hip right hip has decreased flexion and extension, knees have good range of motion.  2+  pitting edema to right foot only.   Lymphadenopathy:      Cervical: No cervical adenopathy.   Skin:     General: Skin is warm and dry.      Findings: No rash.      Comments: Right heel has 4 cm x 2 cm unstageable ulcer with alginate dressing.  Multiple crusty healing ulcers on the left foot.   Neurological:      Mental Status: He is alert and oriented to person, place, and time.      Cranial Nerves: No cranial nerve deficit.      Coordination: Coordination normal.      Comments: Bilateral footdrop, unable to stand easily   Psychiatric:         Mood and Affect: Mood normal.         Behavior: Behavior normal.         Thought Content: Thought content normal.         Judgment: Judgment normal.           Assessment:       1. Pressure injury of right heel, unstageable    2. Pressure injury of left foot, unstageable    3. Pressure injury of left heel, unstageable    4. Pressure injury of dorsum of right foot, stage 2    5. Mixed hyperlipidemia    6. Anemia, unspecified type    7. Infrarenal abdominal aortic aneurysm (AAA) without rupture    8. Coronary artery disease involving native coronary artery of native heart without angina pectoris    9. Essential hypertension    10. Peripheral polyneuropathy    11. Anemia, chronic disease         Plan:       Pressure injury of right heel, unstageable  Encourage patient to increase his calories and carbs to help wound healing.  Add ensure 1 bottle per day Continue 3 times a week wound dressing changes  Pressure injury of left foot, unstageable    Pressure injury of left heel, unstageable    Pressure injury of dorsum of right foot, stage 2    Mixed hyperlipidemia  -     CBC Auto Differential; Future; Expected date: 10/31/2023  -     Comprehensive Metabolic Panel; Future; Expected date: 10/31/2023  -     Lipid Panel; Future; Expected date: 10/31/2023  -     TSH w/reflex to FT4; Future; Expected date: 10/31/2023  Lab work ordered for today  Anemia, unspecified type    Infrarenal  abdominal aortic aneurysm (AAA) without rupture    Coronary artery disease involving native coronary artery of native heart without angina pectoris    Essential hypertension    Peripheral polyneuropathy  On aspirin 81 mg- 2 tablets a day  Anemia, chronic disease  He declines flu vaccine today    Follow up in about 2 months (around 12/31/2023), or Brandi- foot ulcers.        10/31/2023 Zackery Haddad

## 2023-11-01 ENCOUNTER — TELEPHONE (OUTPATIENT)
Dept: FAMILY MEDICINE | Facility: CLINIC | Age: 83
End: 2023-11-01

## 2023-11-01 NOTE — TELEPHONE ENCOUNTER
----- Message from Zackery Haddad MD sent at 11/1/2023  7:55 AM CDT -----  Call patient.  His anemia is gradually improving.  Hematocrit now up to 32.7.  Sugar kidneys and liver all look good.  Cholesterol excellent at 162.  Continue current medications.

## 2023-11-01 NOTE — PROGRESS NOTES
Call patient.  His anemia is gradually improving.  Hematocrit now up to 32.7.  Sugar kidneys and liver all look good.  Cholesterol excellent at 162.  Continue current medications.

## 2023-11-02 LAB
ALBUMIN SERPL-MCNC: 3.7 G/DL (ref 3.6–5.1)
ALBUMIN/GLOB SERPL: 1.2 (CALC) (ref 1–2.5)
ALP SERPL-CCNC: 130 U/L (ref 35–144)
ALT SERPL-CCNC: 6 U/L (ref 9–46)
AST SERPL-CCNC: 12 U/L (ref 10–35)
BASOPHILS # BLD AUTO: 18 CELLS/UL (ref 0–200)
BASOPHILS NFR BLD AUTO: 0.3 %
BILIRUB SERPL-MCNC: 0.4 MG/DL (ref 0.2–1.2)
BUN SERPL-MCNC: 25 MG/DL (ref 7–25)
BUN/CREAT SERPL: ABNORMAL (CALC) (ref 6–22)
CALCIUM SERPL-MCNC: 9 MG/DL (ref 8.6–10.3)
CHLORIDE SERPL-SCNC: 100 MMOL/L (ref 98–110)
CHOLEST SERPL-MCNC: 162 MG/DL
CHOLEST/HDLC SERPL: 3.1 (CALC)
CO2 SERPL-SCNC: 34 MMOL/L (ref 20–32)
CREAT SERPL-MCNC: 1.21 MG/DL (ref 0.7–1.22)
EGFR: 59 ML/MIN/1.73M2
EOSINOPHIL # BLD AUTO: 59 CELLS/UL (ref 15–500)
EOSINOPHIL NFR BLD AUTO: 1 %
ERYTHROCYTE [DISTWIDTH] IN BLOOD BY AUTOMATED COUNT: 15 % (ref 11–15)
GLOBULIN SER CALC-MCNC: 3.1 G/DL (CALC) (ref 1.9–3.7)
GLUCOSE SERPL-MCNC: 91 MG/DL (ref 65–139)
HCT VFR BLD AUTO: 32.7 % (ref 38.5–50)
HDLC SERPL-MCNC: 52 MG/DL
HGB BLD-MCNC: 10.5 G/DL (ref 13.2–17.1)
LDLC SERPL CALC-MCNC: 94 MG/DL (CALC)
LYMPHOCYTES # BLD AUTO: 979 CELLS/UL (ref 850–3900)
LYMPHOCYTES NFR BLD AUTO: 16.6 %
MCH RBC QN AUTO: 29.1 PG (ref 27–33)
MCHC RBC AUTO-ENTMCNC: 32.1 G/DL (ref 32–36)
MCV RBC AUTO: 90.6 FL (ref 80–100)
MONOCYTES # BLD AUTO: 490 CELLS/UL (ref 200–950)
MONOCYTES NFR BLD AUTO: 8.3 %
NEUTROPHILS # BLD AUTO: 4354 CELLS/UL (ref 1500–7800)
NEUTROPHILS NFR BLD AUTO: 73.8 %
NONHDLC SERPL-MCNC: 110 MG/DL (CALC)
PLATELET # BLD AUTO: 176 THOUSAND/UL (ref 140–400)
PMV BLD REES-ECKER: 11.3 FL (ref 7.5–12.5)
POTASSIUM SERPL-SCNC: 4.5 MMOL/L (ref 3.5–5.3)
PROT SERPL-MCNC: 6.8 G/DL (ref 6.1–8.1)
RBC # BLD AUTO: 3.61 MILLION/UL (ref 4.2–5.8)
SODIUM SERPL-SCNC: 142 MMOL/L (ref 135–146)
T4 FREE SERPL-MCNC: 1 NG/DL (ref 0.8–1.8)
TRIGL SERPL-MCNC: 72 MG/DL
TSH SERPL-ACNC: 7.4 MIU/L (ref 0.4–4.5)
WBC # BLD AUTO: 5.9 THOUSAND/UL (ref 3.8–10.8)

## 2023-11-02 NOTE — TELEPHONE ENCOUNTER
Spoke with Karie, pt's spouse in regards to recent lab results. Verbalized per Dr. Haddad that pt's anemia is gradually improving.  Hematocrit now up to 32.7.  Sugar kidneys and liver all look good.  Cholesterol excellent at 162.  Continue current medications. Karie acknowledged understanding

## 2023-11-13 ENCOUNTER — TELEPHONE (OUTPATIENT)
Dept: FAMILY MEDICINE | Facility: CLINIC | Age: 83
End: 2023-11-13

## 2023-11-13 NOTE — TELEPHONE ENCOUNTER
"Per Dr. Haddad "send an order via fax to [facility]. Apply medi-honey lotion to wounds daily."   "

## 2023-11-13 NOTE — LETTER
[address]  [place] [address]  Phone: [phone]   [phone]                        MD Jonathon Candelario, MD Blade Caldwell PA-C Linda Melerine, RAJESH Erwin, RAJESH Padilla, RAJESH      Date: 11/13/2023        Patient: Nimisha Longoria  YOB: 1940      Per chris Navarro to apply medi-honey lotion to wounds daily.        Sincerely,     Electronically Signed By: Zackery Haddad MD

## 2023-11-13 NOTE — TELEPHONE ENCOUNTER
----- Message from Mercedez Preston sent at 11/13/2023 11:06 AM CST -----  Que with [facility] is calling and they need an order to put medi honey from [employer] to put on his wound.   [phone]

## 2023-11-17 ENCOUNTER — TELEPHONE (OUTPATIENT)
Dept: FAMILY MEDICINE | Facility: CLINIC | Age: 83
End: 2023-11-17

## 2023-11-17 NOTE — TELEPHONE ENCOUNTER
----- Message from Krissy Fung sent at 11/17/2023 11:48 AM CST -----  Que called to check if she can put nedihoney on his right heel. He purchased it on [employer]. He would like [facility] to put it on him. Their fax number is [phone].

## 2023-11-20 ENCOUNTER — TELEPHONE (OUTPATIENT)
Dept: FAMILY MEDICINE | Facility: CLINIC | Age: 83
End: 2023-11-20

## 2023-11-20 NOTE — TELEPHONE ENCOUNTER
Spoke with Charis, states the wound on her heel is still greening a bright green drainage. It's been about a week using the medihoney. She is going to send a picture to the PCP email.

## 2023-11-20 NOTE — TELEPHONE ENCOUNTER
----- Message from Mercedez Preston sent at 11/20/2023  2:32 PM CST -----  - 12:56#-geronimo with [facility] is calling and he urinating a lot. No odor. Has a lot of drainage from his wound still   [phone]

## 2023-11-21 NOTE — TELEPHONE ENCOUNTER
"Per Dr. Haddad "needs referral back to Dr. Ashley wound clinic" Spoke with patients wife and let her know per Dr Haddad. She states they cannot afford to keep going back to him because of the copay. Per Dr. Haddad - we will f/u with home health and give further orders.   "

## 2023-11-22 RX ORDER — CIPROFLOXACIN 500 MG/1
500 TABLET ORAL 2 TIMES DAILY
Qty: 30 TABLET | Refills: 0 | Status: SHIPPED | OUTPATIENT
Start: 2023-11-22 | End: 2024-01-03

## 2023-11-22 NOTE — TELEPHONE ENCOUNTER
"Per Dr. Haddad "cipro 500mg po BID #30. Alginate dressing. No medihoney."   Spoke with Rani and let her know. They are already doing silver alignate so they will continue this and will let the patient know about the abx being sent.   "

## 2023-11-22 NOTE — LETTER
"  [address]  [place] [address]  Phone: [phone]   [phone]                        MD Jonathon Candelario MD Chequita Williams, MD Matthew Bassett, PA-C Linda Melerine, RAJESH Erwin, RAJESH Padilla, RAJESH      Date: 11/22/2023        Patient: Nimisha Longoria  YOB: 1940      Per Dr. Haddad "Cipro 500mg twice daily #30. Aliginate dressing, no medihoney."         Sincerely,     Electronically Signed By: Zackery Haddad MD        "

## 2023-11-27 ENCOUNTER — TELEPHONE (OUTPATIENT)
Dept: FAMILY MEDICINE | Facility: CLINIC | Age: 83
End: 2023-11-27

## 2023-11-27 NOTE — TELEPHONE ENCOUNTER
Spoke with Charis, states his BP was high today. We have Metoprolol on his list from May but she states he isn't taking that and they don't have it on his list.     States his BP is not normally high, no BP meds. She isn't sure if he was upset or in pain but he has a BP cuff at home and told him to monitor it.     Printed

## 2023-11-27 NOTE — LETTER
[address]  [place] [address]  Phone: [phone]   [phone]                        MD Jonathon Candelario, MD Blade Caldwell PA-C Linda Melerine, RAJESH Erwin, RAJESH Padilla, RAJESH      Date: 11/27/2023        Patient: Nimisha Longoria  YOB: 1940      Per Dr. Haddad - please monitor BP for the next week and let us know if any are out of range.         Sincerely,     Electronically Signed By: Zackery Haddad MD

## 2023-11-27 NOTE — TELEPHONE ENCOUNTER
----- Message from Mercedez Preston sent at 11/27/2023 12:49 PM CST -----  - 12:47-geronimo with [facility] is calling and his 168/84 and 178/70. No complaints   [phone]

## 2023-11-29 ENCOUNTER — TELEPHONE (OUTPATIENT)
Dept: FAMILY MEDICINE | Facility: CLINIC | Age: 83
End: 2023-11-29

## 2023-11-29 NOTE — TELEPHONE ENCOUNTER
----- Message from Bee Morgan MA sent at 11/29/2023 12:53 PM CST -----    ----- Message -----  From: Mercedez Preston  Sent: 11/29/2023  12:38 PM CST  To: Zackery Haddad Staff    Home health is calling to see if they need to increase the visits with the bp order we just sent. They go out 3 times a week as it is.   Rani [phone]

## 2023-12-06 DIAGNOSIS — I25.10 CORONARY ARTERY DISEASE INVOLVING NATIVE CORONARY ARTERY OF NATIVE HEART WITHOUT ANGINA PECTORIS: ICD-10-CM

## 2023-12-06 RX ORDER — METOPROLOL SUCCINATE 25 MG/1
25 TABLET, EXTENDED RELEASE ORAL DAILY
Qty: 30 TABLET | Refills: 0 | Status: SHIPPED | OUTPATIENT
Start: 2023-12-06 | End: 2023-12-22 | Stop reason: SDUPTHER

## 2023-12-06 NOTE — TELEPHONE ENCOUNTER
"Per Dr. Haddad "restart the Metoprolol daily. Report back in 2 weeks with BP readings." Spoke with patients nurse with [facility]. Aware rx sanjay be sent to [facility] today and she will let the patient know.   "

## 2023-12-06 NOTE — TELEPHONE ENCOUNTER
----- Message from Ashley Mcmullen sent at 12/6/2023 11:54 AM CST -----  Tc  from [facility] called. The patient's B/P is  168/81. The wife forgot to monitor his B/P this week. The Home Health will start monitoring it.  Juliengabriel is at the [facility] now. Please call about his B/P  Tc's # [phone]

## 2023-12-11 ENCOUNTER — TELEPHONE (OUTPATIENT)
Dept: FAMILY MEDICINE | Facility: CLINIC | Age: 83
End: 2023-12-11

## 2023-12-11 NOTE — TELEPHONE ENCOUNTER
----- Message from Mercedez Preston sent at 12/11/2023 12:16 PM CST -----  - 12:14-[facility] is calling izabella beard   [phone]

## 2023-12-11 NOTE — TELEPHONE ENCOUNTER
----- Message from Brandi Carter sent at 12/11/2023 11:05 AM CST -----  Contact: Charis Mackey  is calling concerning the BP is174/78, heart rate is 58 to 60. Charis @[phone]

## 2023-12-12 ENCOUNTER — TELEPHONE (OUTPATIENT)
Dept: FAMILY MEDICINE | Facility: CLINIC | Age: 83
End: 2023-12-12

## 2023-12-12 NOTE — TELEPHONE ENCOUNTER
Spoke with  nurse Charis, states he did start Metoprolol but they only have that one reading. Thinks the wife is checking at home but doesn't have those readings.   LMOR for wife.

## 2023-12-12 NOTE — TELEPHONE ENCOUNTER
----- Message from Mercedez Preston sent at 12/12/2023  1:57 PM CST -----  - 1:18-geronimo  is calling back   [phone]

## 2023-12-13 NOTE — TELEPHONE ENCOUNTER
Spoke with patients wife who states she hasn't been writing the numbers now but she can start. States she feels like it's been elevated in the 150s 160s over 70s. Wife states he is taking the blood pressure medication, Metoprolol.     PRINTED

## 2023-12-13 NOTE — TELEPHONE ENCOUNTER
----- Message from Mercedez Preston sent at 12/13/2023  3:21 PM CST -----  - 3:21-pt wife angy is calling back

## 2023-12-15 ENCOUNTER — TELEPHONE (OUTPATIENT)
Dept: FAMILY MEDICINE | Facility: CLINIC | Age: 83
End: 2023-12-15

## 2023-12-15 RX ORDER — AMLODIPINE BESYLATE 5 MG/1
5 TABLET ORAL DAILY
Qty: 30 TABLET | Refills: 0 | Status: SHIPPED | OUTPATIENT
Start: 2023-12-15 | End: 2024-01-17 | Stop reason: SDUPTHER

## 2023-12-15 NOTE — TELEPHONE ENCOUNTER
----- Message from Mercedez Preston sent at 12/15/2023  9:05 AM CST -----  Pt wife is calling to see about a medication that was supposed to be called in that was not   Karie [phone]

## 2023-12-15 NOTE — TELEPHONE ENCOUNTER
Spoke with Charis who is currently with patient and wife. Informed her that a prescription for amlodipine has been sent to the pharmacy.   Charis verbalized understanding and patient's wife made aware.   Will be contacting clinic back if anything else is needed.

## 2023-12-15 NOTE — TELEPHONE ENCOUNTER
----- Message from Mercedez Preston sent at 12/15/2023 11:00 AM CST -----  Charis with [facility] is calling and his bp is still high. We were supposed to order some bp medicine and they pharmacy does not have anything according to the wife.   [phone]

## 2023-12-22 DIAGNOSIS — I25.10 CORONARY ARTERY DISEASE INVOLVING NATIVE CORONARY ARTERY OF NATIVE HEART WITHOUT ANGINA PECTORIS: ICD-10-CM

## 2023-12-22 RX ORDER — METOPROLOL SUCCINATE 25 MG/1
25 TABLET, EXTENDED RELEASE ORAL DAILY
Qty: 30 TABLET | Refills: 0 | Status: SHIPPED | OUTPATIENT
Start: 2023-12-22 | End: 2024-01-30 | Stop reason: SDUPTHER

## 2023-12-22 NOTE — TELEPHONE ENCOUNTER
----- Message from Ashley Mcmullen sent at 12/22/2023 11:48 AM CST -----  Charis from [facility]. The patient's B/P is still running high in the 170's. He is taking the Norvasc.  Does he still take Metoprolol? If he does he  is out.  [facility]. In [place] Please call Charis and let her know what to do? PRATIBHA orantes is at the patient's house right now.  Charis  [phone]  [facility]

## 2023-12-22 NOTE — TELEPHONE ENCOUNTER
Spoke with Charis and informs clinic patient blood pressure ten minutes ago was 170/82.   Patient states he has been running in the 170s recently. Rechecked blood pressure while on the phone and got 168/80.    Patient is unable to find the bottle for the metoprolol. Patient did start the metoprolol a few weeks again, saw the bottle but unable to find now.     Patient states he is feeling okay, no headache, dizziness or weakness.     Will give to provider for review.

## 2023-12-22 NOTE — TELEPHONE ENCOUNTER
"Per Dr. Haddad, "Sending patient's prescription for Metoprolol in, make sure patient starts taking prescription."    Spoke with Charis and she verbalized understanding of Dr. Haddad's message and will be getting with patient to let him know.     Prescription pended for provider review.   "

## 2023-12-26 ENCOUNTER — TELEPHONE (OUTPATIENT)
Dept: UROLOGY | Facility: CLINIC | Age: 83
End: 2023-12-26
Payer: MEDICARE

## 2024-01-03 ENCOUNTER — TELEPHONE (OUTPATIENT)
Dept: FAMILY MEDICINE | Facility: CLINIC | Age: 84
End: 2024-01-03

## 2024-01-03 ENCOUNTER — OFFICE VISIT (OUTPATIENT)
Dept: FAMILY MEDICINE | Facility: CLINIC | Age: 84
End: 2024-01-03
Payer: MEDICARE

## 2024-01-03 VITALS — DIASTOLIC BLOOD PRESSURE: 68 MMHG | HEART RATE: 63 BPM | OXYGEN SATURATION: 99 % | SYSTOLIC BLOOD PRESSURE: 148 MMHG

## 2024-01-03 DIAGNOSIS — R32 URINARY INCONTINENCE, UNSPECIFIED TYPE: ICD-10-CM

## 2024-01-03 DIAGNOSIS — I10 ESSENTIAL HYPERTENSION: Primary | ICD-10-CM

## 2024-01-03 DIAGNOSIS — R53.81 DEBILITY: ICD-10-CM

## 2024-01-03 DIAGNOSIS — R32 URINARY INCONTINENCE, UNSPECIFIED TYPE: Primary | ICD-10-CM

## 2024-01-03 DIAGNOSIS — C41.2 CHORDOMA: ICD-10-CM

## 2024-01-03 DIAGNOSIS — R79.89 ELEVATED TSH: ICD-10-CM

## 2024-01-03 DIAGNOSIS — L97.412 CHRONIC HEEL ULCER, RIGHT, WITH FAT LAYER EXPOSED: ICD-10-CM

## 2024-01-03 PROCEDURE — 99214 OFFICE O/P EST MOD 30 MIN: CPT | Mod: S$GLB,,, | Performed by: NURSE PRACTITIONER

## 2024-01-03 NOTE — TELEPHONE ENCOUNTER
----- Message from Tianna Snell sent at 1/3/2024 12:51 PM CST -----  Pt's was seen today and needs a 2 month f/u appt.  [phone]

## 2024-01-03 NOTE — TELEPHONE ENCOUNTER
Spoke with pt wife, Karie, who stated that they have already spoken with 's office. She states pt was told they did not know who could get him sooner and could not offer any appt with anyone in their office.

## 2024-01-03 NOTE — TELEPHONE ENCOUNTER
I can put him in for referral to Dr. Vasquez and they can call and see if he can get them in sooner

## 2024-01-03 NOTE — PATIENT INSTRUCTIONS
Increase to 2 protein shakes daily    Monitor blood pressure at home and call if blood pressure is consistently above 150

## 2024-01-03 NOTE — TELEPHONE ENCOUNTER
----- Message from Mercedez Preston sent at 1/3/2024  3:16 PM CST -----  Pt wife angy would like to know if he can see someone else besides taylor. They are so booked up   [phone]

## 2024-01-03 NOTE — TELEPHONE ENCOUNTER
That would really be a question for Urology, I do not have a preference who he sees but I am not sure who can see him sooner than the 19th- I would have them ask Dr. Quiroz's office if there is someone else who could see him sooner

## 2024-01-03 NOTE — PROGRESS NOTES
SUBJECTIVE:    Patient ID: Nimisha Longoria is a 83 y.o. male.    Chief Complaint: Follow-up (No bottles. Pt is here for follow up. Pt decline weight and height check.)    Pt here for 2 month f/u    Pt reports overall doing okay. Remains WC bound since right hip fracture after fall in April- had hospital and then SNF stay. Developed bilat heel pressure ulcers during hospital stay. Reports has been having [facility] coming out for wound care to bilat heel ulcers- reports left heel and buttock wound has healed. Still has right heel ulcer though told it is healing. Pt states was told he would need to find wound care doctor because HH was stopping? Pt had previously been seeing Dr. Ashley at outpt wound care but reports cannot afford the copays for each visit. Able to transfer independently from bed to  and  to toilet. Denies any recent falls    BP has been high on HH readings the past few weeks- first started on amlodipine 5mg and more recently started on metoprolol 25mg daily.     Reports incontinent during the night, states he feels no urge ot urinate at night but will have large volume incontinence, no dysuria or hematuria. Reports during the day has no issues with voiding, has good flow and feels urge- has appt to see Dr. 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 Gonsales but told on last imaging there was slight progression- advised surgery not an option and was referred to rad onc but advised couldn't have anymore radiation. had been referred to specialist in [place] but insurance wouldn't approve. Bilat legs numb from knees down.              Office Visit on 10/31/2023   Component Date Value Ref Range Status    WBC 10/31/2023 5.9  3.8 - 10.8 Thousand/uL Final    RBC 10/31/2023 3.61 (L)  4.20 - 5.80 Million/uL Final    Hemoglobin 10/31/2023 10.5 (L)  13.2 - 17.1 g/dL Final    Hematocrit 10/31/2023 32.7 (L)  38.5 - 50.0 % Final    MCV 10/31/2023 90.6   80.0 - 100.0 fL Final    MCH 10/31/2023 29.1  27.0 - 33.0 pg Final    MCHC 10/31/2023 32.1  32.0 - 36.0 g/dL Final    RDW 10/31/2023 15.0  11.0 - 15.0 % Final    Platelets 10/31/2023 176  140 - 400 Thousand/uL Final    MPV 10/31/2023 11.3  7.5 - 12.5 fL Final    Neutrophils, Abs 10/31/2023 4,354  1,500 - 7,800 cells/uL Final    Lymph # 10/31/2023 979  850 - 3,900 cells/uL Final    Mono # 10/31/2023 490  200 - 950 cells/uL Final    Eos # 10/31/2023 59  15 - 500 cells/uL Final    Baso # 10/31/2023 18  0 - 200 cells/uL Final    Neutrophils Relative 10/31/2023 73.8  % Final    Lymph % 10/31/2023 16.6  % Final    Mono % 10/31/2023 8.3  % Final    Eosinophil % 10/31/2023 1.0  % Final    Basophil % 10/31/2023 0.3  % Final    Glucose 10/31/2023 91  65 - 139 mg/dL Final    BUN 10/31/2023 25  7 - 25 mg/dL Final    Creatinine 10/31/2023 1.21  0.70 - 1.22 mg/dL Final    eGFR 10/31/2023 59 (L)  > OR = 60 mL/min/1.73m2 Final    BUN/Creatinine Ratio 10/31/2023 SEE NOTE:  6 - 22 (calc) Final    Sodium 10/31/2023 142  135 - 146 mmol/L Final    Potassium 10/31/2023 4.5  3.5 - 5.3 mmol/L Final    Chloride 10/31/2023 100  98 - 110 mmol/L Final    CO2 10/31/2023 34 (H)  20 - 32 mmol/L Final    Calcium 10/31/2023 9.0  8.6 - 10.3 mg/dL Final    Total Protein 10/31/2023 6.8  6.1 - 8.1 g/dL Final    Albumin 10/31/2023 3.7  3.6 - 5.1 g/dL Final    Globulin, Total 10/31/2023 3.1  1.9 - 3.7 g/dL (calc) Final    Albumin/Globulin Ratio 10/31/2023 1.2  1.0 - 2.5 (calc) Final    Total Bilirubin 10/31/2023 0.4  0.2 - 1.2 mg/dL Final    Alkaline Phosphatase 10/31/2023 130  35 - 144 U/L Final    AST 10/31/2023 12  10 - 35 U/L Final    ALT 10/31/2023 6 (L)  9 - 46 U/L Final    Cholesterol 10/31/2023 162  <200 mg/dL Final    HDL 10/31/2023 52  > OR = 40 mg/dL Final    Triglycerides 10/31/2023 72  <150 mg/dL Final    LDL Cholesterol 10/31/2023 94  mg/dL (calc) Final    HDL/Cholesterol Ratio 10/31/2023 3.1  <5.0 (calc) Final    Non HDL Chol. (LDL+VLDL)  10/31/2023 110  <130 mg/dL (calc) Final    TSH w/reflex to FT4 10/31/2023 7.40 (H)  0.40 - 4.50 mIU/L Final    T4, Free 10/31/2023 1.0  0.8 - 1.8 ng/dL Final   Office Visit on 10/23/2023   Component Date Value Ref Range Status    Aerobic Bacterial Culture 10/23/2023 Skin joão,  no predominant organism   Final   Office Visit on 09/25/2023   Component Date Value Ref Range Status    Aerobic Bacterial Culture 09/25/2023  (A)   Final                    Value:ESCHERICHIA COLI  Moderate      Aerobic Bacterial Culture 09/25/2023  (A)   Final                    Value:PSEUDOMONAS AERUGINOSA  Few         Past Medical History:   Diagnosis Date    Abnormal SPEP 1/17/2021    Anemia     Anemia due to multiple mechanisms 1/17/2021    Anemia, chronic disease 1/17/2021    Coronary artery disease     Hyperlipidemia     Hypertension     Normochromic normocytic anemia 1/17/2021    Sacral chordoma      Past Surgical History:   Procedure Laterality Date    BACK SURGERY      CORONARY ARTERY BYPASS GRAFT      HIP SURGERY  2023    INTRAMEDULLARY RODDING OF FEMUR Right 04/08/2023    Procedure: INSERTION, INTRAMEDULLARY CHUCKY, FEMUR, synthes, hana table, 1st assist;  Surgeon: Tyler Rizvi MD;  Location: [facility];  Service: Orthopedics;  Laterality: Right;    SPINE SURGERY      Dr Villatoro     No family history on file.    All of your core healthy metrics are met.      The CVD Risk score (D'Agostino, et al., 2008) failed to calculate for the following reasons:    The 2008 CVD risk score is only valid for ages 30 to 74     Marital Status:   Alcohol History:  reports no history of alcohol use.  Tobacco History:  reports that he has quit smoking. His smoking use included cigarettes. He has a 50.0 pack-year smoking history. He has never used smokeless tobacco.  Drug History:  reports no history of drug use.    Health Maintenance Topics with due status: Not Due       Topic Last Completion Date    TETANUS VACCINE 04/05/2016     Aspirin/Antiplatelet Therapy 10/31/2023    Lipid Panel 10/31/2023     Immunization History   Administered Date(s) Administered    COVID-19, MRNA, LN-S, PF (Pfizer) (Purple Cap) 02/09/2021, 03/02/2021, 12/01/2021    PPD Test 04/11/2023    Tdap 04/05/2016       Review of patient's allergies indicates:  No Known Allergies    Current Outpatient Medications:     amLODIPine (NORVASC) 5 MG tablet, Take 1 tablet (5 mg total) by mouth once daily., Disp: 30 tablet, Rfl: 0    aspirin (ECOTRIN) 81 MG EC tablet, Take 2 tablets (162 mg total) by mouth once daily. Resume May 13, after  BID course finishes, Disp: , Rfl:     metoprolol succinate (TOPROL-XL) 25 MG 24 hr tablet, Take 1 tablet (25 mg total) by mouth once daily., Disp: 30 tablet, Rfl: 0    senna-docusate 8.6-50 mg (PERICOLACE) 8.6-50 mg per tablet, Take 1 tablet by mouth every 12 (twelve) hours as needed for Constipation., Disp: 60 tablet, Rfl: 0    Review of Systems   Constitutional:  Positive for appetite change (reports eats 2 meals a day and drinks one ensure daily). Negative for chills and fever. Unexpected weight change: pt unsure if he's had any recent weight loss, unable to stand to weigh today- reports lost approx 30-40lbs earlier this year during/after hospital stay.  HENT:  Positive for hearing loss. Negative for sore throat and trouble swallowing.    Eyes:  Negative for visual disturbance.   Respiratory:  Negative for cough, shortness of breath and wheezing.    Cardiovascular:  Negative for chest pain, palpitations and leg swelling.   Gastrointestinal:  Negative for abdominal pain, constipation (occasional mild constipation), diarrhea, nausea and vomiting.   Genitourinary:  Negative for difficulty urinating, dysuria, frequency and hematuria.   Musculoskeletal:  Positive for gait problem (able to stand to pivot otherwise WC bound d/t leg weakness). Negative for back pain.   Skin:  Positive for wound (right heel ulcer). Negative for rash.    Neurological:  Positive for numbness (bilat feet up to knees bilat). Negative for dizziness, syncope, speech difficulty and headaches.   Psychiatric/Behavioral:  Negative for dysphoric mood. The patient is not nervous/anxious.           Objective:      Vitals:    01/03/24 1152 01/03/24 1153 01/03/24 1243   BP: (!) 160/70 (!) 158/68 (!) 148/68   Pulse: 63     SpO2: 99%       Physical Exam  Vitals and nursing note reviewed.   Constitutional:       General: He is not in acute distress.     Appearance: He is well-developed.      Comments: Elderly thin WM sitting in WC. Appears much thinner than the last time I've seen him   HENT:      Head: Normocephalic and atraumatic.      Right Ear: There is impacted cerumen.      Left Ear: Tympanic membrane and ear canal normal.   Neck:      Vascular: No carotid bruit.   Cardiovascular:      Rate and Rhythm: Normal rate and regular rhythm.      Pulses:           Dorsalis pedis pulses are 2+ on the right side.      Heart sounds: No murmur heard.     No friction rub. No gallop.   Pulmonary:      Effort: Pulmonary effort is normal. No respiratory distress.      Breath sounds: Normal breath sounds. No wheezing or rales.   Abdominal:      General: There is no distension.      Palpations: Abdomen is soft.      Tenderness: There is no abdominal tenderness.   Musculoskeletal:      Cervical back: Neck supple.      Right lower leg: Edema (1+ pitting edema ankle) present.      Left lower leg: No edema.   Lymphadenopathy:      Cervical: No cervical adenopathy.   Skin:     General: Skin is warm and dry.      Findings: No rash.          Neurological:      General: No focal deficit present.      Mental Status: He is alert and oriented to person, place, and time.      Gait: Gait normal.   Psychiatric:         Mood and Affect: Mood normal.           Assessment:       1. Essential hypertension    2. Chronic heel ulcer, right, with fat layer exposed    3. Chordoma    4. Urinary incontinence,  unspecified type    5. Elevated TSH    6. Debility           Plan:       1. Essential hypertension   -BP remains slightly elevated, meds recently added- recommend monitoring BP at home and call if BP consistently >150    2. Chronic heel ulcer, right, with fat layer exposed   -will reorder HH and [facility] wound care for home wound care. For now apply santyl to wound bed slough. Discussed imp of nutrition for wound healing and recommend he increase to 2 protein supplements daily    3. Chordoma   -was advised he's not a surgical candidate and cannot have any further radiation    4. Urinary incontinence, unspecified type   -scheduled to see urology    5. Elevated TSH   -slightly elevated TSH, FT4 1.0 on last labs    6. Debility   -basically WC bound since fall/hip fx earlier this year.    Follow up in about 2 months (around 3/3/2024) for HTN, heel ulcer.          Counseled on age and gender appropriate medical preventative services, including cancer screenings, immunizations, overall nutritional health, need for a consistent exercise regimen and an overall push towards maintaining a vigorous and active lifestyle.      1/3/2024 Brandi Welsh NP

## 2024-01-03 NOTE — TELEPHONE ENCOUNTER
Left detailed message informing pt that his 2mo follow up appt has been scheduled for 3/4/2024 @12pm. If that does not work please give us a call back to reschedule.

## 2024-01-04 ENCOUNTER — TELEPHONE (OUTPATIENT)
Dept: UROLOGY | Facility: CLINIC | Age: 84
End: 2024-01-04
Payer: MEDICARE

## 2024-01-04 NOTE — TELEPHONE ENCOUNTER
Spoke with pt Per provider as initial appt on 1.19 noted previously was booked in error and md unavailable at that time. Last seen by NP O'Agnieszka so offered sooner evaluation/appointment to reestablish care as next available MD appt was later than pt preferred to be seen. Pt stated that he got an appt with someone else. Pt verbalized understanding.

## 2024-01-12 ENCOUNTER — EXTERNAL HOME HEALTH (OUTPATIENT)
Dept: HOME HEALTH SERVICES | Facility: HOSPITAL | Age: 84
End: 2024-01-12
Payer: MEDICARE

## 2024-01-17 RX ORDER — AMLODIPINE BESYLATE 5 MG/1
5 TABLET ORAL DAILY
Qty: 90 TABLET | Refills: 3 | Status: SHIPPED | OUTPATIENT
Start: 2024-01-17 | End: 2025-01-16

## 2024-01-17 NOTE — TELEPHONE ENCOUNTER
----- Message from Mercedez Preston sent at 1/17/2024  3:19 PM CST -----  - 3:01- pt needs refill on amlodipine   [facility]   [phone]

## 2024-01-29 ENCOUNTER — TELEPHONE (OUTPATIENT)
Dept: FAMILY MEDICINE | Facility: CLINIC | Age: 84
End: 2024-01-29
Payer: MEDICARE

## 2024-01-29 DIAGNOSIS — L97.412 CHRONIC HEEL ULCER, RIGHT, WITH FAT LAYER EXPOSED: Primary | ICD-10-CM

## 2024-01-29 NOTE — TELEPHONE ENCOUNTER
----- Message from Mercedez Preston sent at 1/29/2024  4:01 PM CST -----  - 3:32- geronimo with [facility] is calling to get wound care nurse to come to his house. Either [facility] or [facility]   [phone]

## 2024-01-30 DIAGNOSIS — I25.10 CORONARY ARTERY DISEASE INVOLVING NATIVE CORONARY ARTERY OF NATIVE HEART WITHOUT ANGINA PECTORIS: ICD-10-CM

## 2024-01-30 RX ORDER — METOPROLOL SUCCINATE 25 MG/1
25 TABLET, EXTENDED RELEASE ORAL DAILY
Qty: 90 TABLET | Refills: 1 | Status: SHIPPED | OUTPATIENT
Start: 2024-01-30

## 2024-01-30 NOTE — TELEPHONE ENCOUNTER
----- Message from Mercedez Preston sent at 1/30/2024 12:29 PM CST -----  Pt needs refill metoprolol   [facility]   [phone]

## 2024-03-22 ENCOUNTER — TELEPHONE (OUTPATIENT)
Dept: FAMILY MEDICINE | Facility: CLINIC | Age: 84
End: 2024-03-22
Payer: MEDICARE

## 2024-03-22 ENCOUNTER — HOSPITAL ENCOUNTER (INPATIENT)
Facility: HOSPITAL | Age: 84
LOS: 4 days | Discharge: HOSPICE | DRG: 682 | End: 2024-03-26
Attending: EMERGENCY MEDICINE | Admitting: STUDENT IN AN ORGANIZED HEALTH CARE EDUCATION/TRAINING PROGRAM
Payer: MEDICARE

## 2024-03-22 DIAGNOSIS — R07.9 CHEST PAIN: ICD-10-CM

## 2024-03-22 DIAGNOSIS — E87.5 HYPERKALEMIA: ICD-10-CM

## 2024-03-22 DIAGNOSIS — N17.9 ACUTE RENAL FAILURE, UNSPECIFIED ACUTE RENAL FAILURE TYPE: Primary | ICD-10-CM

## 2024-03-22 PROBLEM — N39.0 UTI (URINARY TRACT INFECTION): Status: ACTIVE | Noted: 2024-03-22

## 2024-03-22 LAB
ALBUMIN SERPL BCP-MCNC: 3.6 G/DL (ref 3.5–5.2)
ALP SERPL-CCNC: 71 U/L (ref 55–135)
ALT SERPL W/O P-5'-P-CCNC: 12 U/L (ref 10–44)
ANION GAP SERPL CALC-SCNC: 24 MMOL/L (ref 8–16)
ANION GAP SERPL CALC-SCNC: 25 MMOL/L (ref 8–16)
AST SERPL-CCNC: 9 U/L (ref 10–40)
BACTERIA #/AREA URNS HPF: ABNORMAL /HPF
BASOPHILS # BLD AUTO: 0.02 K/UL (ref 0–0.2)
BASOPHILS NFR BLD: 0.3 % (ref 0–1.9)
BILIRUB SERPL-MCNC: 0.2 MG/DL (ref 0.1–1)
BILIRUB UR QL STRIP: NEGATIVE
BUN SERPL-MCNC: 216 MG/DL (ref 8–23)
BUN SERPL-MCNC: 228 MG/DL (ref 8–23)
CALCIUM SERPL-MCNC: 7.9 MG/DL (ref 8.7–10.5)
CALCIUM SERPL-MCNC: 8 MG/DL (ref 8.7–10.5)
CHLORIDE SERPL-SCNC: 108 MMOL/L (ref 95–110)
CHLORIDE SERPL-SCNC: 109 MMOL/L (ref 95–110)
CLARITY UR: ABNORMAL
CO2 SERPL-SCNC: 10 MMOL/L (ref 23–29)
CO2 SERPL-SCNC: 8 MMOL/L (ref 23–29)
COLOR UR: YELLOW
CREAT SERPL-MCNC: 15.3 MG/DL (ref 0.5–1.4)
CREAT SERPL-MCNC: 16.2 MG/DL (ref 0.5–1.4)
DIFFERENTIAL METHOD BLD: ABNORMAL
EOSINOPHIL # BLD AUTO: 0 K/UL (ref 0–0.5)
EOSINOPHIL NFR BLD: 0.5 % (ref 0–8)
ERYTHROCYTE [DISTWIDTH] IN BLOOD BY AUTOMATED COUNT: 15 % (ref 11.5–14.5)
EST. GFR  (NO RACE VARIABLE): 3 ML/MIN/1.73 M^2
EST. GFR  (NO RACE VARIABLE): 3 ML/MIN/1.73 M^2
GLUCOSE SERPL-MCNC: 77 MG/DL (ref 70–110)
GLUCOSE SERPL-MCNC: 84 MG/DL (ref 70–110)
GLUCOSE UR QL STRIP: NEGATIVE
HCT VFR BLD AUTO: 24.2 % (ref 40–54)
HGB BLD-MCNC: 8.1 G/DL (ref 14–18)
HGB UR QL STRIP: ABNORMAL
HYALINE CASTS #/AREA URNS LPF: 0 /LPF
IMM GRANULOCYTES # BLD AUTO: 0.07 K/UL (ref 0–0.04)
IMM GRANULOCYTES NFR BLD AUTO: 1.2 % (ref 0–0.5)
KETONES UR QL STRIP: ABNORMAL
LEUKOCYTE ESTERASE UR QL STRIP: ABNORMAL
LIPASE SERPL-CCNC: 176 U/L (ref 4–60)
LYMPHOCYTES # BLD AUTO: 0.5 K/UL (ref 1–4.8)
LYMPHOCYTES NFR BLD: 8.9 % (ref 18–48)
MAGNESIUM SERPL-MCNC: 2.4 MG/DL (ref 1.6–2.6)
MCH RBC QN AUTO: 30 PG (ref 27–31)
MCHC RBC AUTO-ENTMCNC: 33.5 G/DL (ref 32–36)
MCV RBC AUTO: 90 FL (ref 82–98)
MICROSCOPIC COMMENT: ABNORMAL
MONOCYTES # BLD AUTO: 0.2 K/UL (ref 0.3–1)
MONOCYTES NFR BLD: 3.3 % (ref 4–15)
NEUTROPHILS # BLD AUTO: 5.2 K/UL (ref 1.8–7.7)
NEUTROPHILS NFR BLD: 85.8 % (ref 38–73)
NITRITE UR QL STRIP: NEGATIVE
NRBC BLD-RTO: 0 /100 WBC
PH UR STRIP: 6 [PH] (ref 5–8)
PLATELET # BLD AUTO: 119 K/UL (ref 150–450)
PMV BLD AUTO: 11.5 FL (ref 9.2–12.9)
POCT GLUCOSE: 76 MG/DL (ref 70–110)
POTASSIUM SERPL-SCNC: 6 MMOL/L (ref 3.5–5.1)
POTASSIUM SERPL-SCNC: 6.8 MMOL/L (ref 3.5–5.1)
PROT SERPL-MCNC: 7.2 G/DL (ref 6–8.4)
PROT UR QL STRIP: ABNORMAL
RBC # BLD AUTO: 2.7 M/UL (ref 4.6–6.2)
RBC #/AREA URNS HPF: 8 /HPF (ref 0–4)
SODIUM SERPL-SCNC: 141 MMOL/L (ref 136–145)
SODIUM SERPL-SCNC: 143 MMOL/L (ref 136–145)
SP GR UR STRIP: 1.01 (ref 1–1.03)
URN SPEC COLLECT METH UR: ABNORMAL
UROBILINOGEN UR STRIP-ACNC: NEGATIVE EU/DL
WBC # BLD AUTO: 6.07 K/UL (ref 3.9–12.7)
WBC #/AREA URNS HPF: >100 /HPF (ref 0–5)
WBC CLUMPS URNS QL MICRO: ABNORMAL

## 2024-03-22 PROCEDURE — 80048 BASIC METABOLIC PNL TOTAL CA: CPT

## 2024-03-22 PROCEDURE — 36415 COLL VENOUS BLD VENIPUNCTURE: CPT | Performed by: INTERNAL MEDICINE

## 2024-03-22 PROCEDURE — 80048 BASIC METABOLIC PNL TOTAL CA: CPT | Mod: 91 | Performed by: INTERNAL MEDICINE

## 2024-03-22 PROCEDURE — 63600175 PHARM REV CODE 636 W HCPCS: Performed by: EMERGENCY MEDICINE

## 2024-03-22 PROCEDURE — 25000003 PHARM REV CODE 250: Performed by: EMERGENCY MEDICINE

## 2024-03-22 PROCEDURE — 87040 BLOOD CULTURE FOR BACTERIA: CPT

## 2024-03-22 PROCEDURE — 96366 THER/PROPH/DIAG IV INF ADDON: CPT

## 2024-03-22 PROCEDURE — 81000 URINALYSIS NONAUTO W/SCOPE: CPT | Performed by: EMERGENCY MEDICINE

## 2024-03-22 PROCEDURE — 36415 COLL VENOUS BLD VENIPUNCTURE: CPT | Performed by: EMERGENCY MEDICINE

## 2024-03-22 PROCEDURE — 93005 ELECTROCARDIOGRAM TRACING: CPT

## 2024-03-22 PROCEDURE — 96375 TX/PRO/DX INJ NEW DRUG ADDON: CPT

## 2024-03-22 PROCEDURE — 87086 URINE CULTURE/COLONY COUNT: CPT | Performed by: EMERGENCY MEDICINE

## 2024-03-22 PROCEDURE — 82962 GLUCOSE BLOOD TEST: CPT

## 2024-03-22 PROCEDURE — 96361 HYDRATE IV INFUSION ADD-ON: CPT

## 2024-03-22 PROCEDURE — 36415 COLL VENOUS BLD VENIPUNCTURE: CPT

## 2024-03-22 PROCEDURE — 96374 THER/PROPH/DIAG INJ IV PUSH: CPT | Mod: 59

## 2024-03-22 PROCEDURE — 80053 COMPREHEN METABOLIC PANEL: CPT | Performed by: EMERGENCY MEDICINE

## 2024-03-22 PROCEDURE — 85025 COMPLETE CBC W/AUTO DIFF WBC: CPT | Performed by: EMERGENCY MEDICINE

## 2024-03-22 PROCEDURE — 87147 CULTURE TYPE IMMUNOLOGIC: CPT | Performed by: EMERGENCY MEDICINE

## 2024-03-22 PROCEDURE — 93010 ELECTROCARDIOGRAM REPORT: CPT | Mod: ,,, | Performed by: GENERAL PRACTICE

## 2024-03-22 PROCEDURE — 96365 THER/PROPH/DIAG IV INF INIT: CPT

## 2024-03-22 PROCEDURE — 99291 CRITICAL CARE FIRST HOUR: CPT

## 2024-03-22 PROCEDURE — 83690 ASSAY OF LIPASE: CPT | Performed by: EMERGENCY MEDICINE

## 2024-03-22 PROCEDURE — 83735 ASSAY OF MAGNESIUM: CPT | Performed by: EMERGENCY MEDICINE

## 2024-03-22 PROCEDURE — 51798 US URINE CAPACITY MEASURE: CPT

## 2024-03-22 PROCEDURE — 11000001 HC ACUTE MED/SURG PRIVATE ROOM

## 2024-03-22 PROCEDURE — 20600001 HC STEP DOWN PRIVATE ROOM

## 2024-03-22 RX ORDER — FUROSEMIDE 10 MG/ML
80 INJECTION INTRAMUSCULAR; INTRAVENOUS
Status: COMPLETED | OUTPATIENT
Start: 2024-03-22 | End: 2024-03-22

## 2024-03-22 RX ORDER — ACETAMINOPHEN 325 MG/1
650 TABLET ORAL EVERY 4 HOURS PRN
Status: DISCONTINUED | OUTPATIENT
Start: 2024-03-22 | End: 2024-03-26 | Stop reason: HOSPADM

## 2024-03-22 RX ORDER — DEXTROSE 50 % IN WATER (D50W) INTRAVENOUS SYRINGE
25
Status: COMPLETED | OUTPATIENT
Start: 2024-03-22 | End: 2024-03-22

## 2024-03-22 RX ORDER — ASPIRIN 81 MG/1
162 TABLET ORAL DAILY
Status: CANCELLED | OUTPATIENT
Start: 2024-03-23

## 2024-03-22 RX ORDER — AMLODIPINE BESYLATE 5 MG/1
5 TABLET ORAL DAILY
Status: DISCONTINUED | OUTPATIENT
Start: 2024-03-23 | End: 2024-03-26 | Stop reason: HOSPADM

## 2024-03-22 RX ORDER — HEPARIN SODIUM 5000 [USP'U]/ML
5000 INJECTION, SOLUTION INTRAVENOUS; SUBCUTANEOUS EVERY 8 HOURS
Status: DISCONTINUED | OUTPATIENT
Start: 2024-03-22 | End: 2024-03-26 | Stop reason: HOSPADM

## 2024-03-22 RX ORDER — IBUPROFEN 200 MG
24 TABLET ORAL
Status: DISCONTINUED | OUTPATIENT
Start: 2024-03-22 | End: 2024-03-26 | Stop reason: HOSPADM

## 2024-03-22 RX ORDER — PROCHLORPERAZINE EDISYLATE 5 MG/ML
5 INJECTION INTRAMUSCULAR; INTRAVENOUS EVERY 6 HOURS PRN
Status: DISCONTINUED | OUTPATIENT
Start: 2024-03-22 | End: 2024-03-26 | Stop reason: HOSPADM

## 2024-03-22 RX ORDER — NALOXONE HCL 0.4 MG/ML
0.02 VIAL (ML) INJECTION
Status: DISCONTINUED | OUTPATIENT
Start: 2024-03-22 | End: 2024-03-26 | Stop reason: HOSPADM

## 2024-03-22 RX ORDER — GLUCAGON 1 MG
1 KIT INJECTION
Status: DISCONTINUED | OUTPATIENT
Start: 2024-03-22 | End: 2024-03-26 | Stop reason: HOSPADM

## 2024-03-22 RX ORDER — ALUMINUM HYDROXIDE, MAGNESIUM HYDROXIDE, AND SIMETHICONE 1200; 120; 1200 MG/30ML; MG/30ML; MG/30ML
30 SUSPENSION ORAL 4 TIMES DAILY PRN
Status: DISCONTINUED | OUTPATIENT
Start: 2024-03-22 | End: 2024-03-26 | Stop reason: HOSPADM

## 2024-03-22 RX ORDER — AMLODIPINE BESYLATE 5 MG/1
5 TABLET ORAL DAILY
Status: CANCELLED | OUTPATIENT
Start: 2024-03-23

## 2024-03-22 RX ORDER — SIMETHICONE 80 MG
1 TABLET,CHEWABLE ORAL 4 TIMES DAILY PRN
Status: DISCONTINUED | OUTPATIENT
Start: 2024-03-22 | End: 2024-03-26 | Stop reason: HOSPADM

## 2024-03-22 RX ORDER — VIBEGRON 75 MG/1
1 TABLET, FILM COATED ORAL DAILY
COMMUNITY
Start: 2024-03-14

## 2024-03-22 RX ORDER — ONDANSETRON HYDROCHLORIDE 2 MG/ML
4 INJECTION, SOLUTION INTRAVENOUS EVERY 8 HOURS PRN
Status: DISCONTINUED | OUTPATIENT
Start: 2024-03-22 | End: 2024-03-26 | Stop reason: HOSPADM

## 2024-03-22 RX ORDER — METOPROLOL SUCCINATE 25 MG/1
25 TABLET, EXTENDED RELEASE ORAL DAILY
Status: DISCONTINUED | OUTPATIENT
Start: 2024-03-23 | End: 2024-03-26 | Stop reason: HOSPADM

## 2024-03-22 RX ORDER — IBUPROFEN 200 MG
16 TABLET ORAL
Status: DISCONTINUED | OUTPATIENT
Start: 2024-03-22 | End: 2024-03-26 | Stop reason: HOSPADM

## 2024-03-22 RX ORDER — SODIUM CHLORIDE 0.9 % (FLUSH) 0.9 %
10 SYRINGE (ML) INJECTION EVERY 12 HOURS PRN
Status: DISCONTINUED | OUTPATIENT
Start: 2024-03-22 | End: 2024-03-26 | Stop reason: HOSPADM

## 2024-03-22 RX ORDER — SODIUM CHLORIDE 9 MG/ML
1000 INJECTION, SOLUTION INTRAVENOUS
Status: COMPLETED | OUTPATIENT
Start: 2024-03-22 | End: 2024-03-22

## 2024-03-22 RX ADMIN — SODIUM CHLORIDE 1000 ML: 9 INJECTION, SOLUTION INTRAVENOUS at 04:03

## 2024-03-22 RX ADMIN — FUROSEMIDE 80 MG: 10 INJECTION, SOLUTION INTRAMUSCULAR; INTRAVENOUS at 08:03

## 2024-03-22 RX ADMIN — SODIUM ZIRCONIUM CYCLOSILICATE 10 G: 10 POWDER, FOR SUSPENSION ORAL at 03:03

## 2024-03-22 RX ADMIN — SODIUM CHLORIDE, POTASSIUM CHLORIDE, SODIUM LACTATE AND CALCIUM CHLORIDE 1000 ML: 600; 310; 30; 20 INJECTION, SOLUTION INTRAVENOUS at 02:03

## 2024-03-22 RX ADMIN — SODIUM BICARBONATE: 84 INJECTION, SOLUTION INTRAVENOUS at 05:03

## 2024-03-22 RX ADMIN — SODIUM ZIRCONIUM CYCLOSILICATE 10 G: 10 POWDER, FOR SUSPENSION ORAL at 08:03

## 2024-03-22 RX ADMIN — FUROSEMIDE 80 MG: 10 INJECTION, SOLUTION INTRAMUSCULAR; INTRAVENOUS at 03:03

## 2024-03-22 RX ADMIN — INSULIN HUMAN 10 UNITS: 100 INJECTION, SOLUTION PARENTERAL at 03:03

## 2024-03-22 RX ADMIN — DEXTROSE MONOHYDRATE 25 G: 25 INJECTION, SOLUTION INTRAVENOUS at 04:03

## 2024-03-22 NOTE — ED PROVIDER NOTES
Encounter Date: 3/22/2024       History     Chief Complaint   Patient presents with    Generalized Weakness     Worsening over the past several days with decreased appetite     84-year-old male with a past medical history of CAD, hypertension, hyperlipidemia, and sacral chordoma presents for evaluation of multiple complaints.  EMS reports the patient has had generalized weakness for approximately month.  They also report that for the last 2 days the patient has had nausea and a decreased appetite.  The patient does confirm this as well.  The patient denies any associated headache, cough, congestion, fever/chills, chest pain, back pain, neck pain, shortness of breath, vomiting, abdominal pain, or diarrhea.  There are no aggravating factors.  The patient reports that he is normally bed-bound.  The patient also reports right ear pain.  The patient's nausea has improved with 4 mg of IV Zofran given prior to arrival by EMS.      Review of patient's allergies indicates:  No Known Allergies  Past Medical History:   Diagnosis Date    Abnormal SPEP 1/17/2021    Anemia     Anemia due to multiple mechanisms 1/17/2021    Anemia, chronic disease 1/17/2021    Coronary artery disease     Hyperlipidemia     Hypertension     Normochromic normocytic anemia 1/17/2021    Sacral chordoma      Past Surgical History:   Procedure Laterality Date    BACK SURGERY      CORONARY ARTERY BYPASS GRAFT      HIP SURGERY  2023    INTRAMEDULLARY RODDING OF FEMUR Right 04/08/2023    Procedure: INSERTION, INTRAMEDULLARY CHUCKY, FEMUR, synthes, hana table, 1st assist;  Surgeon: Tyler Rizvi MD;  Location: [facility];  Service: Orthopedics;  Laterality: Right;    SPINE SURGERY      Dr Villatoro     No family history on file.  Social History     Tobacco Use    Smoking status: Former     Current packs/day: 2.00     Average packs/day: 2.0 packs/day for 25.0 years (50.0 ttl pk-yrs)     Types: Cigarettes    Smokeless tobacco: Never    Tobacco comments:      quit 1985   Substance Use Topics    Alcohol use: No    Drug use: No     Review of Systems   Constitutional:  Positive for appetite change and fatigue. Negative for chills, diaphoresis and fever.   HENT:  Positive for ear pain. Negative for congestion and rhinorrhea.    Respiratory:  Negative for cough and shortness of breath.    Cardiovascular:  Negative for chest pain.   Gastrointestinal:  Positive for nausea. Negative for abdominal pain, diarrhea and vomiting.   Genitourinary:  Negative for dysuria, frequency and testicular pain.   Musculoskeletal:  Negative for gait problem.   Skin:  Negative for color change.   Neurological:  Negative for dizziness and numbness.   Psychiatric/Behavioral:  Negative for agitation and confusion.        Physical Exam     Initial Vitals [03/22/24 1221]   BP Pulse Resp Temp SpO2   129/61 63 16 97.4 °F (36.3 °C) 98 %      MAP       --         Physical Exam    Nursing note and vitals reviewed.  Constitutional: He appears well-developed.   Generalized weakness noted.   HENT:   Head: Normocephalic and atraumatic.   Right Ear: External ear normal.   Left Ear: External ear normal.   Nose: Nose normal.   Mouth/Throat: Oropharynx is clear and moist.   Cerumen noted to the left ear canal   Eyes: EOM are normal. Pupils are equal, round, and reactive to light.   Neck: Neck supple.   Cardiovascular:  Normal rate and regular rhythm.           Pulmonary/Chest: Breath sounds normal.   Abdominal: Abdomen is soft. Bowel sounds are normal. He exhibits no distension. There is no abdominal tenderness. There is no rebound and no guarding.   Musculoskeletal:         General: Normal range of motion.      Cervical back: Neck supple.      Comments: Chronic contractures noted to bilateral lower extremities, with bilateral chronic foot drop noted.     Neurological: He is alert and oriented to person, place, and time.   Skin: Skin is warm and dry.   Psychiatric: He has a normal mood and affect.         ED  Course   Critical Care    Date/Time: 3/22/2024 4:15 PM    Performed by: Vinay Awad MD  Authorized by: Vinay Awad MD  Direct patient critical care time: 22 minutes  Additional history critical care time: 21 minutes  Ordering / reviewing critical care time: 15 minutes  Documentation critical care time: 18 minutes  Consulting other physicians critical care time: 12 minutes  Total critical care time (exclusive of procedural time) : 88 minutes  Critical care was time spent personally by me on the following activities: development of treatment plan with patient or surrogate, discussions with consultants, examination of patient, obtaining history from patient or surrogate, ordering and performing treatments and interventions and ordering and review of laboratory studies.        Labs Reviewed   COMPREHENSIVE METABOLIC PANEL - Abnormal; Notable for the following components:       Result Value    Potassium 6.8 (*)     CO2 8 (*)      (*)     Creatinine 16.2 (*)     Calcium 8.0 (*)     AST 9 (*)     eGFR 3 (*)     Anion Gap 25 (*)     All other components within normal limits    Narrative:      Potassium and CO2  critical result(s) called and verbal readback   obtained from Mercedes Morgan by BTI1 03/22/2024 14:28   CBC W/ AUTO DIFFERENTIAL - Abnormal; Notable for the following components:    RBC 2.70 (*)     Hemoglobin 8.1 (*)     Hematocrit 24.2 (*)     RDW 15.0 (*)     Platelets 119 (*)     Immature Granulocytes 1.2 (*)     Immature Grans (Abs) 0.07 (*)     Lymph # 0.5 (*)     Mono # 0.2 (*)     Gran % 85.8 (*)     Lymph % 8.9 (*)     Mono % 3.3 (*)     All other components within normal limits   LIPASE - Abnormal; Notable for the following components:    Lipase 176 (*)     All other components within normal limits   URINALYSIS, REFLEX TO URINE CULTURE - Abnormal; Notable for the following components:    Appearance, UA Hazy (*)     Protein, UA 1+ (*)     Ketones, UA Trace (*)     Occult Blood UA 1+ (*)      Leukocytes, UA 3+ (*)     All other components within normal limits    Narrative:     Specimen Source->Urine   URINALYSIS MICROSCOPIC - Abnormal; Notable for the following components:    RBC, UA 8 (*)     WBC, UA >100 (*)     WBC Clumps, UA Few (*)     All other components within normal limits    Narrative:     Specimen Source->Urine   BASIC METABOLIC PANEL - Abnormal; Notable for the following components:    Potassium 6.0 (*)     CO2 10 (*)      (*)     Creatinine 15.3 (*)     Calcium 7.9 (*)     Anion Gap 24 (*)     eGFR 3 (*)     All other components within normal limits   MAGNESIUM   POCT GLUCOSE          Imaging Results    None          Medications   sodium bicarbonate 150 mEq in dextrose 5 % (D5W) 1,000 mL infusion ( Intravenous New Bag 3/23/24 0519)   metoprolol succinate (TOPROL-XL) 24 hr tablet 25 mg (has no administration in time range)   amLODIPine tablet 5 mg (has no administration in time range)   sodium chloride 0.9% flush 10 mL (has no administration in time range)   naloxone 0.4 mg/mL injection 0.02 mg (has no administration in time range)   glucose chewable tablet 16 g (has no administration in time range)   glucose chewable tablet 24 g (has no administration in time range)   glucagon (human recombinant) injection 1 mg (has no administration in time range)   heparin (porcine) injection 5,000 Units (5,000 Units Subcutaneous Given 3/23/24 0050)   acetaminophen tablet 650 mg (has no administration in time range)   ondansetron injection 4 mg (has no administration in time range)   prochlorperazine injection Soln 5 mg (has no administration in time range)   simethicone chewable tablet 80 mg (has no administration in time range)   aluminum-magnesium hydroxide-simethicone 200-200-20 mg/5 mL suspension 30 mL (has no administration in time range)   cefTRIAXone (Rocephin) 1 g in dextrose 5 % in water (D5W) 100 mL IVPB (MB+) (0 g Intravenous Stopped 3/23/24 0119)   dextrose 10% bolus 125 mL 125 mL  (has no administration in time range)   dextrose 10% bolus 250 mL 250 mL (has no administration in time range)   lactated ringers bolus 1,000 mL (0 mLs Intravenous Stopped 3/22/24 1553)   furosemide injection 80 mg (80 mg Intravenous Given 3/22/24 1554)   insulin regular injection 10 Units 0.1 mL (10 Units Intravenous Given 3/22/24 1555)   dextrose 50 % in water (D50W) injection 25 g (25 g Intravenous Given 3/22/24 1600)   sodium zirconium cyclosilicate packet 10 g (10 g Oral Given 3/22/24 1554)   0.9%  NaCl infusion (0 mLs Intravenous Stopped 3/22/24 2132)   sodium zirconium cyclosilicate packet 10 g (10 g Oral Given 3/22/24 2007)   furosemide injection 80 mg (80 mg Intravenous Given 3/22/24 2007)   furosemide injection 60 mg (60 mg Intravenous Given 3/23/24 0434)     Medical Decision Making  84-year-old male presents with multiple complaints.    Initial differential diagnosis included but not limited to UTI, dehydration, and electrolyte abnormality.    Amount and/or Complexity of Data Reviewed  Labs: ordered.    Risk  OTC drugs.  Prescription drug management.  Decision regarding hospitalization.  Risk Details: The patient was emergently evaluated in the emergency department, his evaluation was significant for an elderly male with a benign abdominal exam.               ED Course as of 03/23/24 0732   Fri Mar 22, 2024   1510 The patient was noted to be retaining a large amount of urine on bladder scan.  The nursing staff placed a Simental catheter with approximately 700 cc of clear yellow urine noted out. [PE]   1530 The patient's labs were concerning for an elevated potassium, elevated creatinine, low bicarbonate level, and an elevated BUN.  The case was discussed with the nephrologist on-call, Dr. Haro.  He is recommending 80 mg of IV Lasix, IV insulin, IV bicarbonate drip, a dose of D50, and a dose of Lokelma.  He also recommends repeating the patient's labs 2 hours after these things have been done, due  determine if the patient needs emergent dialysis. [PE]   1548 The case was discussed with the APC on call for the hospitalist service.  She will evaluate the patient in the emergency department. [PE]   1630 The patient was evaluated by the nephrologist on-call at bedside, Dr. Haro.  We will repeat the patient's labs at 6:30 p.m..  If his potassium level is down to below 6, then he can be admitted to our hospital here. [PE]   1941 Repeat potassium is 6 0.  Creatinine improving.  CO2 improving.  Patient has had about a 1000 cc of urine output since given Lasix.  Discussed with - patient can stay here, leave on bicarb drip at 100 cc/hour overnight.  Administered another dose of Lasix 80 mg.  Another  bmp in 4 hours.  If less than 5.5 stay the course, if greater than 5.5 another dose of 60 mg Lasix. [AS]      ED Course User Index  [AS] Santy Neal MD  [PE] Vinay Awad MD                           Clinical Impression:  Final diagnoses:  [E87.5] Hyperkalemia  [N17.9] Acute renal failure, unspecified acute renal failure type (Primary)          ED Disposition Condition    Admit                 Vinay Awad MD  03/23/24 2000

## 2024-03-22 NOTE — Clinical Note
Diagnosis: Acute renal failure, unspecified acute renal failure type [[id number]]   Future Attending Provider: SANTOS CURRY [[id number]]   Admit to which facility:: [facility] [[id number]]   Reason for IP Medical Treatment  (Clinical interventions that can only be accomplished in the IP setting? ) :: Acute renal failure   I certify that Inpatient services for greater than or equal to 2 midnights are medically necessary:: Yes   Plans for Post-Acute care--if anticipated (pick the single best option):: A. No post acute care anticipated at this time

## 2024-03-22 NOTE — ED NOTES
Pt. Unable to provide urine specimen at this time, given 8 oz cup of water, family x 1 at bedside, no other needs at this time, will continue to monitor.

## 2024-03-22 NOTE — CONSULTS
Nephrology Consult Note        Patient Name: Nimisha Longoria  MRN: [id number]    Patient Class: Emergency   Admission Date: 3/22/2024  Length of Stay: 0 days  Date of Service: 3/22/2024    Attending Physician: Vinay Awad MD  Primary Care Provider: Zackery Haddad MD    Reason for Consult: meghan/hyperkalemia/uremia/acidosis    SUBJECTIVE:     HPI: 84M with a past medical history of CAD, HTN, HLD,sacral chordoma presents for evaluation of multiple complaints. EMS reports the patient has had generalized weakness for approximately month. They also report that for the last 2 days the patient has had nausea and a decreased appetite but denies any associated headache, cough, congestion, fever/chills, chest pain, back pain, neck pain, shortness of breath, vomiting, abdominal pain, or diarrhea.     Notably, about a week ago was started on Gemtesa for overactive bladder. In ER had oh placed with immediate evacuation of 700cc cloudy urine. Notably hsi scr is 16, BUN > 200, K 6.8.    Per d/w with Dr. Awad we will treat hyperkalemia per protocol with bicarb drip, Lasix 80mg IV x1, Inuln + D50, Lokelma and repeat labs in 2-3h. If unable to lower K < 5, will need to consider dialysis. This plan was d/w patient and wife at bedside and they are both agreeable. Questions answered.    Past Medical History:   Diagnosis Date    Abnormal SPEP 1/17/2021    Anemia     Anemia due to multiple mechanisms 1/17/2021    Anemia, chronic disease 1/17/2021    Coronary artery disease     Hyperlipidemia     Hypertension     Normochromic normocytic anemia 1/17/2021    Sacral chordoma      Past Surgical History:   Procedure Laterality Date    BACK SURGERY      CORONARY ARTERY BYPASS GRAFT      HIP SURGERY  2023    INTRAMEDULLARY RODDING OF FEMUR Right 04/08/2023    Procedure: INSERTION, INTRAMEDULLARY CHUCKY, FEMUR, synthes, hana table, 1st assist;  Surgeon: Tyler Rizvi MD;  Location: [facility];  Service: Orthopedics;  Laterality:  Right;    SPINE SURGERY      Dr Villatoro     No family history on file.  Social History     Tobacco Use    Smoking status: Former     Current packs/day: 2.00     Average packs/day: 2.0 packs/day for 25.0 years (50.0 ttl pk-yrs)     Types: Cigarettes    Smokeless tobacco: Never    Tobacco comments:     quit 1985   Substance Use Topics    Alcohol use: No    Drug use: No       Review of patient's allergies indicates:  No Known Allergies    Outpatient meds:  No current facility-administered medications on file prior to encounter.     Current Outpatient Medications on File Prior to Encounter   Medication Sig Dispense Refill    amLODIPine (NORVASC) 5 MG tablet Take 1 tablet (5 mg total) by mouth once daily. 90 tablet 3    aspirin (ECOTRIN) 81 MG EC tablet Take 2 tablets (162 mg total) by mouth once daily. Resume May 13, after  BID course finishes      GEMTESA 75 mg Tab Take 1 tablet by mouth once daily.      metoprolol succinate (TOPROL-XL) 25 MG 24 hr tablet Take 1 tablet (25 mg total) by mouth once daily. 90 tablet 1    senna-docusate 8.6-50 mg (PERICOLACE) 8.6-50 mg per tablet Take 1 tablet by mouth every 12 (twelve) hours as needed for Constipation. 60 tablet 0       Scheduled meds:   dextrose 50 % in water (D50W)  25 g Intravenous ED 1 Time       Infusions:   sodium bicarbonate 150 mEq in dextrose 5 % (D5W) 1,000 mL infusion         PRN meds:      Review of Systems:  Constitutional:  Negative for chills, fever, malaise/fatigue and weight loss.   HENT:  Negative for hearing loss and nosebleeds.    Eyes:  Negative for blurred vision, double vision and photophobia.   Respiratory:  Negative for cough, shortness of breath and wheezing.    Cardiovascular:  Negative for chest pain, palpitations and leg swelling.   Gastrointestinal:  Negative for abdominal pain, constipation, diarrhea, heartburn, nausea and vomiting.   Genitourinary:  Negative for dysuria, frequency and urgency.   Musculoskeletal:  Negative for  falls, joint pain and myalgias.   Skin:  Negative for itching and rash.   Neurological:  Negative for dizziness, speech change, focal weakness, loss of consciousness and headaches.   Endo/Heme/Allergies:  Does not bruise/bleed easily.   Psychiatric/Behavioral:  Negative for depression and substance abuse. The patient is not nervous/anxious.      OBJECTIVE:     Vital Signs and IO:  Temp:  [97.4 °F (36.3 °C)]   Pulse:  [59-63]   Resp:  [16]   BP: (124-141)/(59-66)   SpO2:  [98 %-100 %]   No intake/output data recorded.  Wt Readings from Last 5 Encounters:   03/22/24 68 kg (150 lb)   08/14/23 71.2 kg (157 lb)   04/23/23 71.4 kg (157 lb 6 oz)   04/27/23 71.4 kg (157 lb 6.5 oz)   04/08/23 70.8 kg (156 lb 1.4 oz)     Body mass index is 22.81 kg/m².    Physical Exam  Constitutional:       General: Patient is not in acute distress.     Appearance: Patient is well-developed. She is not diaphoretic.   HENT:      Head: Normocephalic and atraumatic.      Mouth/Throat: Mucous membranes are moist.   Eyes:      General: No scleral icterus.     Pupils: Pupils are equal, round, and reactive to light.   Cardiovascular:      Rate and Rhythm: Normal rate and regular rhythm.   Pulmonary:      Effort: Pulmonary effort is normal. No respiratory distress.      Breath sounds: No stridor.   Abdominal:      General: There is no distension.      Palpations: Abdomen is soft.   Musculoskeletal:         General: No deformity. Normal range of motion.      Cervical back: Neck supple.   Skin:     General: Skin is warm and dry.      Findings: No rash present. No erythema.   Neurological:      Mental Status: Patient is alert and oriented to person, place, and time.      Cranial Nerves: No cranial nerve deficit.   Psychiatric:         Behavior: Behavior normal.     Laboratory:  Recent Labs   Lab 03/22/24  1305      K 6.8*      CO2 8*   *   CREATININE 16.2*   GLU 77       Recent Labs   Lab 03/22/24  1305   CALCIUM 8.0*   ALBUMIN 3.6    MG 2.4             Recent Labs   Lab 03/22/24  1606   POCTGLUCOSE 76             Recent Labs   Lab 03/22/24  1305   WBC 6.07   HGB 8.1*   HCT 24.2*   *   MCV 90   MCHC 33.5   MONO 3.3*  0.2*   EOSINOPHIL 0.5       Recent Labs   Lab 03/22/24  1305   BILITOT 0.2   PROT 7.2   ALBUMIN 3.6   ALKPHOS 71   ALT 12   AST 9*       Recent Labs   Lab 05/09/23  0850 03/22/24  1515   Color, UA Yellow Yellow   Appearance, UA Hazy A Hazy A   pH, UA 6.0 6.0   Specific [place], UA 1.020 1.010   Protein, UA 1+ A 1+ A   Glucose, UA Negative Negative   Ketones, UA Negative Trace A   Urobilinogen, UA Negative Negative   Bilirubin (UA) Negative Negative   Occult Blood UA 1+ A 1+ A   Nitrite, UA Negative Negative   RBC, UA 0 8 H   WBC, UA >100 H >100 H   Bacteria Moderate A Rare   Hyaline Casts, UA 2 A 0       Recent Labs   Lab 09/27/22  1238   Sample VENOUS       Microbiology Results (last 7 days)       Procedure Component Value Units Date/Time    Urine culture [[id number]] Collected: 03/22/24 1515    Order Status: No result Specimen: Urine Updated: 03/22/24 1531            ASSESSMENT/PLAN:     MAC due to urinary retention due to Gemtasa  Possible UTI  Hyperkalemia  Acidosis  Uremia  CKD stage 3  Anemia  HTN  No NSAIDs, ACEI/ARB, IV contrast or other nephrotoxins.  Keep MAP > 60, SBP > 100.  Dose meds for GFR < 15 ml/min.  Renal diet - low K, low phos.  Lokelma 10gm, may repeat tonight as needed.  Bicarb gtt at 100cc/hr + lasix 80mg IV x1  Insulin Regular IV + 1 amp of D50  Treat UTI, follow Cx.  Keep oh and monitor UO. Obviously hold Gemtesa.  If no improvement with K, will consider trialysis line placement and emergent HD.  Hgb and HCT are acceptable. Monitor for now.  BP seem controlled. Tolerate asymptomatic HTN up to -160.    Thank you for allowing us to participate in the care of your patient!   We will follow the patient and provide recommendations as needed.    Patient care time was spent personally by me on  the following activities:     Obtaining a history.  Examination of patient.  Providing medical care at the patients bedside.  Developing a treatment plan with patient or surrogate and bedside caregivers.  Ordering and reviewing laboratory studies, radiographic studies, pulse oximetry.  Ordering and performing treatments and interventions.  Evaluation of patient's response to treatment.  Discussions with consultants while on the unit and immediately available to the patient.  Re-evaluation of the patient's condition.  Documentation in the medical record.     Dre Haro MD    [facility]  [address] [place] [id number]    [phone] - tel  [phone] - fax    3/22/2024

## 2024-03-22 NOTE — TELEPHONE ENCOUNTER
----- Message from Elsie Aguayo sent at 3/22/2024 10:32 AM CDT -----  Wife Leanna calling about pt being very sick with no appetite for 2 days. Pt wife would like to know if she should send him to the hospital.   [phone]

## 2024-03-22 NOTE — TELEPHONE ENCOUNTER
Spoke to pts wife and she stated pt is extremely weak, he has not eaten in 2 days and she can barely get him to drink any water and she can't get him out the bed anymore to use the bathroom and she been changing him in the bed . She wanted to know if we think he should go to the ER.  Let her know if he weak and has not eaten or drank anything in 2 day we recommend the ER. Pts wife stated she thought so but she just wanted to be sure

## 2024-03-23 LAB
ALBUMIN SERPL BCP-MCNC: 3.2 G/DL (ref 3.5–5.2)
ALP SERPL-CCNC: 65 U/L (ref 55–135)
ALT SERPL W/O P-5'-P-CCNC: 12 U/L (ref 10–44)
ANION GAP SERPL CALC-SCNC: 23 MMOL/L (ref 8–16)
ANION GAP SERPL CALC-SCNC: 24 MMOL/L (ref 8–16)
AST SERPL-CCNC: 10 U/L (ref 10–40)
BASOPHILS # BLD AUTO: 0.02 K/UL (ref 0–0.2)
BASOPHILS NFR BLD: 0.3 % (ref 0–1.9)
BILIRUB SERPL-MCNC: 0.3 MG/DL (ref 0.1–1)
BUN SERPL-MCNC: 208 MG/DL (ref 8–23)
BUN SERPL-MCNC: 213 MG/DL (ref 8–23)
CALCIUM SERPL-MCNC: 7.5 MG/DL (ref 8.7–10.5)
CALCIUM SERPL-MCNC: 7.6 MG/DL (ref 8.7–10.5)
CHLORIDE SERPL-SCNC: 105 MMOL/L (ref 95–110)
CHLORIDE SERPL-SCNC: 107 MMOL/L (ref 95–110)
CO2 SERPL-SCNC: 11 MMOL/L (ref 23–29)
CO2 SERPL-SCNC: 13 MMOL/L (ref 23–29)
COMPLEXED PSA SERPL-MCNC: 1.1 NG/ML (ref 0–4)
CREAT SERPL-MCNC: 14.3 MG/DL (ref 0.5–1.4)
CREAT SERPL-MCNC: 14.8 MG/DL (ref 0.5–1.4)
DIFFERENTIAL METHOD BLD: ABNORMAL
EOSINOPHIL # BLD AUTO: 0.1 K/UL (ref 0–0.5)
EOSINOPHIL NFR BLD: 1.2 % (ref 0–8)
ERYTHROCYTE [DISTWIDTH] IN BLOOD BY AUTOMATED COUNT: 14.6 % (ref 11.5–14.5)
EST. GFR  (NO RACE VARIABLE): 3 ML/MIN/1.73 M^2
EST. GFR  (NO RACE VARIABLE): 3 ML/MIN/1.73 M^2
GLUCOSE SERPL-MCNC: 112 MG/DL (ref 70–110)
GLUCOSE SERPL-MCNC: 84 MG/DL (ref 70–110)
HCT VFR BLD AUTO: 21.3 % (ref 40–54)
HGB BLD-MCNC: 7.4 G/DL (ref 14–18)
IMM GRANULOCYTES # BLD AUTO: 0.03 K/UL (ref 0–0.04)
IMM GRANULOCYTES NFR BLD AUTO: 0.5 % (ref 0–0.5)
LYMPHOCYTES # BLD AUTO: 0.6 K/UL (ref 1–4.8)
LYMPHOCYTES NFR BLD: 10.3 % (ref 18–48)
MCH RBC QN AUTO: 30 PG (ref 27–31)
MCHC RBC AUTO-ENTMCNC: 34.7 G/DL (ref 32–36)
MCV RBC AUTO: 86 FL (ref 82–98)
MONOCYTES # BLD AUTO: 0.4 K/UL (ref 0.3–1)
MONOCYTES NFR BLD: 6.1 % (ref 4–15)
NEUTROPHILS # BLD AUTO: 4.7 K/UL (ref 1.8–7.7)
NEUTROPHILS NFR BLD: 81.6 % (ref 38–73)
NRBC BLD-RTO: 0 /100 WBC
PLATELET # BLD AUTO: 106 K/UL (ref 150–450)
PMV BLD AUTO: 11.5 FL (ref 9.2–12.9)
POTASSIUM SERPL-SCNC: 4.9 MMOL/L (ref 3.5–5.1)
POTASSIUM SERPL-SCNC: 5.7 MMOL/L (ref 3.5–5.1)
PROT SERPL-MCNC: 6.3 G/DL (ref 6–8.4)
RBC # BLD AUTO: 2.47 M/UL (ref 4.6–6.2)
SODIUM SERPL-SCNC: 141 MMOL/L (ref 136–145)
SODIUM SERPL-SCNC: 142 MMOL/L (ref 136–145)
WBC # BLD AUTO: 5.73 K/UL (ref 3.9–12.7)

## 2024-03-23 PROCEDURE — 97162 PT EVAL MOD COMPLEX 30 MIN: CPT

## 2024-03-23 PROCEDURE — 85025 COMPLETE CBC W/AUTO DIFF WBC: CPT

## 2024-03-23 PROCEDURE — 25000003 PHARM REV CODE 250: Performed by: HOSPITALIST

## 2024-03-23 PROCEDURE — 20600001 HC STEP DOWN PRIVATE ROOM

## 2024-03-23 PROCEDURE — 63600175 PHARM REV CODE 636 W HCPCS

## 2024-03-23 PROCEDURE — 80053 COMPREHEN METABOLIC PANEL: CPT

## 2024-03-23 PROCEDURE — 25000003 PHARM REV CODE 250: Performed by: INTERNAL MEDICINE

## 2024-03-23 PROCEDURE — 84153 ASSAY OF PSA TOTAL: CPT | Performed by: SPECIALIST

## 2024-03-23 PROCEDURE — 25000003 PHARM REV CODE 250

## 2024-03-23 PROCEDURE — 36415 COLL VENOUS BLD VENIPUNCTURE: CPT | Performed by: SPECIALIST

## 2024-03-23 PROCEDURE — 36415 COLL VENOUS BLD VENIPUNCTURE: CPT

## 2024-03-23 PROCEDURE — 63600175 PHARM REV CODE 636 W HCPCS: Performed by: NURSE PRACTITIONER

## 2024-03-23 PROCEDURE — 63600175 PHARM REV CODE 636 W HCPCS: Performed by: HOSPITALIST

## 2024-03-23 PROCEDURE — 94761 N-INVAS EAR/PLS OXIMETRY MLT: CPT

## 2024-03-23 RX ORDER — MUPIROCIN 20 MG/G
OINTMENT TOPICAL 2 TIMES DAILY
Status: DISCONTINUED | OUTPATIENT
Start: 2024-03-23 | End: 2024-03-26 | Stop reason: HOSPADM

## 2024-03-23 RX ORDER — FUROSEMIDE 10 MG/ML
60 INJECTION INTRAMUSCULAR; INTRAVENOUS ONCE
Status: COMPLETED | OUTPATIENT
Start: 2024-03-23 | End: 2024-03-23

## 2024-03-23 RX ORDER — CIPROFLOXACIN AND DEXAMETHASONE 3; 1 MG/ML; MG/ML
4 SUSPENSION/ DROPS AURICULAR (OTIC) 2 TIMES DAILY
Status: DISCONTINUED | OUTPATIENT
Start: 2024-03-23 | End: 2024-03-26 | Stop reason: HOSPADM

## 2024-03-23 RX ADMIN — METOPROLOL SUCCINATE 25 MG: 25 TABLET, EXTENDED RELEASE ORAL at 08:03

## 2024-03-23 RX ADMIN — CIPROFLOXACIN AND DEXAMETHASONE 4 DROP: 3; 1 SUSPENSION/ DROPS AURICULAR (OTIC) at 11:03

## 2024-03-23 RX ADMIN — CEFTRIAXONE SODIUM 1 G: 1 INJECTION, POWDER, FOR SOLUTION INTRAMUSCULAR; INTRAVENOUS at 12:03

## 2024-03-23 RX ADMIN — CIPROFLOXACIN AND DEXAMETHASONE 4 DROP: 3; 1 SUSPENSION/ DROPS AURICULAR (OTIC) at 08:03

## 2024-03-23 RX ADMIN — CEFTRIAXONE SODIUM 1 G: 1 INJECTION, POWDER, FOR SOLUTION INTRAMUSCULAR; INTRAVENOUS at 10:03

## 2024-03-23 RX ADMIN — SODIUM BICARBONATE: 84 INJECTION, SOLUTION INTRAVENOUS at 08:03

## 2024-03-23 RX ADMIN — HEPARIN SODIUM 5000 UNITS: 5000 INJECTION, SOLUTION INTRAVENOUS; SUBCUTANEOUS at 05:03

## 2024-03-23 RX ADMIN — HEPARIN SODIUM 5000 UNITS: 5000 INJECTION, SOLUTION INTRAVENOUS; SUBCUTANEOUS at 12:03

## 2024-03-23 RX ADMIN — SODIUM BICARBONATE: 84 INJECTION, SOLUTION INTRAVENOUS at 05:03

## 2024-03-23 RX ADMIN — AMLODIPINE BESYLATE 5 MG: 5 TABLET ORAL at 08:03

## 2024-03-23 RX ADMIN — MUPIROCIN: 20 OINTMENT TOPICAL at 08:03

## 2024-03-23 RX ADMIN — MUPIROCIN: 20 OINTMENT TOPICAL at 11:03

## 2024-03-23 RX ADMIN — FUROSEMIDE 60 MG: 10 INJECTION, SOLUTION INTRAMUSCULAR; INTRAVENOUS at 04:03

## 2024-03-23 RX ADMIN — HEPARIN SODIUM 5000 UNITS: 5000 INJECTION, SOLUTION INTRAVENOUS; SUBCUTANEOUS at 08:03

## 2024-03-23 RX ADMIN — SODIUM BICARBONATE: 84 INJECTION, SOLUTION INTRAVENOUS at 12:03

## 2024-03-23 RX ADMIN — HEPARIN SODIUM 5000 UNITS: 5000 INJECTION, SOLUTION INTRAVENOUS; SUBCUTANEOUS at 10:03

## 2024-03-23 NOTE — PT/OT/SLP EVAL
Physical Therapy Evaluation    Patient Name:  Nimisha Longoria   MRN:  [id number]    Recommendations:     Discharge Recommendations: Low Intensity Therapy   Discharge Equipment Recommendations: bedside commode, bath bench, wound care supplies, slide board   Barriers to discharge: None    Assessment:     Nimisha Longoria is a 84 y.o. male admitted with a medical diagnosis of Acute renal failure.  He presents with the following impairments/functional limitations: weakness, impaired endurance, impaired balance, decreased lower extremity function Pt is agreeable to participate in PT evaluation. He reports at baseline he spends most of his time in a hospital bed or slide transfers into a wheelchair. He is unable to stand up by himself. He lives with his spouse who is able to help with transfers. He requires Max A to sit edge of bed and is able to perform lower extremity therex with Min A.     Rehab Prognosis: Good and Fair; patient would benefit from acute skilled PT services to address these deficits and reach maximum level of function.    Recent Surgery: * No surgery found *      Plan:     During this hospitalization, patient to be seen 6 x/week to address the identified rehab impairments via therapeutic activities, therapeutic exercises, neuromuscular re-education and progress toward the following goals:    Plan of Care Expires:  04/23/24    Subjective     Chief Complaint: wasn't feeling well, which lead him to come to the hospital.   Patient/Family Comments/goals: to maximize functional strength and mobility prior to discharge  Pain/Comfort:  Pain Rating 1: 0/10    Patients cultural, spiritual, Restoration conflicts given the current situation:      Living Environment:  Lives with his spouse in a 1 story home. 1 threshold step to enter.     Prior to admission, patients level of function was wheelchair and bed bound, slide transfer from bed to wheelchair with MIN A from spouse.  Equipment used at home: rollator, shower  chair, wheelchair, hospital bed.  DME owned (not currently used): none.  Upon discharge, patient will have assistance from his spouse.    Objective:     Communicated with nurse Ordoñez prior to session.  Patient found HOB elevated with peripheral IV, oh catheter  upon PT entry to room.    General Precautions: Standard, fall  Orthopedic Precautions:N/A   Braces: N/A  Respiratory Status: Room air    Exams:  Cognitive Exam:  Patient is oriented to Person and Place  RUE ROM: WFL  RUE Strength: WFL  LUE ROM: WFL  LUE Strength: WFL  RLE ROM: WFL except ankle Plantar flexion contracture   RLE Strength: WFL  LLE ROM: WFL except ankle Plantar flexion contracture  LLE Strength: WFL    Functional Mobility:  Bed Mobility:     Rolling Right: moderate assistance  Supine to Sit: maximal assistance  Sit to Supine: maximal assistance      AM-PAC 6 CLICK MOBILITY  Total Score:11       Treatment & Education:    Bed mobility as above   Seated EOB Long arc quad  x10 each side and seated marching x10 each side     Patient left HOB elevated with all lines intact, call button in reach, and bed alarm on.    GOALS:   Multidisciplinary Problems       Physical Therapy Goals          Problem: Physical Therapy    Goal Priority Disciplines Outcome Goal Variances Interventions   Physical Therapy Goal     PT, PT/OT Ongoing, Progressing                         History:     Past Medical History:   Diagnosis Date    Abnormal SPEP 1/17/2021    Anemia     Anemia due to multiple mechanisms 1/17/2021    Anemia, chronic disease 1/17/2021    Coronary artery disease     Hyperlipidemia     Hypertension     Normochromic normocytic anemia 1/17/2021    Sacral chordoma        Past Surgical History:   Procedure Laterality Date    BACK SURGERY      CORONARY ARTERY BYPASS GRAFT      HIP SURGERY  2023    INTRAMEDULLARY RODDING OF FEMUR Right 04/08/2023    Procedure: INSERTION, INTRAMEDULLARY CHUCKY, FEMUR, synthes, hana table, 1st assist;  Surgeon: Tyler MAR  MD Dmitri;  Location: [facility];  Service: Orthopedics;  Laterality: Right;    SPINE SURGERY      Dr Villatoro       Time Tracking:     PT Received On: 03/23/24  PT Start Time: 0903     PT Stop Time: 0922  PT Total Time (min): 19 min     Billable Minutes: Evaluation 19 03/23/2024

## 2024-03-23 NOTE — ASSESSMENT & PLAN NOTE
Urinalysis with >100WBC with recommendations from nephrology to treat UTI    -Urine culture pending  -Ceftriaxone 1 gram IV daily

## 2024-03-23 NOTE — ASSESSMENT & PLAN NOTE
Chronic and present upon arrival    -Wound care consulted  -Offload heels and pressure points  -Turn patient Q 2 hours

## 2024-03-23 NOTE — NURSING
Nurses Note -- 4 Eyes      3/23/2024   6:17 AM      Skin assessed during: Admit      [] No Altered Skin Integrity Present    []Prevention Measures Documented      [x] Yes- Altered Skin Integrity Present or Discovered   [x] LDA Added if Not in Epic (Describe Wound)   [x] New Altered Skin Integrity was Present on Admit and Documented in LDA   [x] Wound Image Taken    Wound Care Consulted? Yes    Attending Nurse:  SOLE Barrios     Second RN/Staff Member:  SOLE Newsome

## 2024-03-23 NOTE — ASSESSMENT & PLAN NOTE
Chronic and present upon arrival    -Wound care consulted  -Offload heels and pressure points  -Turn patient Q 2 hours   INTENSIVE CARE UNIT

## 2024-03-23 NOTE — SUBJECTIVE & OBJECTIVE
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 Vasquez on 2/9/24.     Patient is seen in the multidisciplinary rounds, patient is more awake alert, no fever or chills, no nausea vomiting diarrhea, excellent urine output, no active bleeding, Simental catheter placed in the ER, has around 2.7 L urine output overnight.     Review of Systems   Constitutional:  Positive for fatigue. Negative for activity change and fever.   HENT:  Negative for ear discharge, facial swelling and sore throat.    Eyes:  Negative for photophobia, pain and visual disturbance.   Respiratory:  Negative for apnea, cough and shortness of breath.    Cardiovascular:  Negative for chest pain and leg swelling.   Gastrointestinal:  Negative for abdominal pain and blood in stool.   Endocrine: Negative for cold intolerance and heat intolerance.   Musculoskeletal:  Negative for back pain and gait problem.   Skin:  Negative for pallor and rash.   Neurological:  Negative for speech difficulty and headaches.   Psychiatric/Behavioral:  Negative for confusion, hallucinations and suicidal ideas.    All other systems reviewed and are negative.    Objective:     Vital Signs (Most Recent):  Temp: 97.6 °F (36.4 °C) (03/23/24 0900)  Pulse: 62 (03/23/24 1100)  Resp: (!) 22 (03/23/24 1100)  BP: 137/67 (03/23/24 1100)  SpO2: 100 % (03/23/24 1100) Vital Signs (24h Range):  Temp:  [97.4 °F (36.3 °C)-97.6 °F (36.4 °C)] 97.6 °F (36.4 °C)  Pulse:  [59-71] 62  Resp:  [12-29] 22  SpO2:  [98 %-100 %] 100 %  BP: (114-152)/(58-74) 137/67     Weight: 57.2 kg (126 lb 1.7 oz)  Body mass index is 19.17 kg/m².    Intake/Output Summary  (Last 24 hours) at 3/23/2024 1114  Last data filed at 3/23/2024 0839  Gross per 24 hour   Intake 1150 ml   Output 3525 ml   Net -2375 ml         Physical Exam  Vitals and nursing note reviewed.   Constitutional:       General: He is not in acute distress.     Appearance: He is not diaphoretic.   HENT:      Head: Normocephalic.   Eyes:      General: No scleral icterus.        Right eye: No discharge.         Left eye: No discharge.      Conjunctiva/sclera: Conjunctivae normal.   Neck:      Vascular: No JVD.   Cardiovascular:      Rate and Rhythm: Normal rate and regular rhythm.      Heart sounds: Normal heart sounds. No murmur heard.     No friction rub.   Pulmonary:      Effort: Pulmonary effort is normal. No respiratory distress.      Breath sounds: Normal breath sounds. No wheezing.   Abdominal:      General: Bowel sounds are normal. There is no distension.      Palpations: Abdomen is soft.      Tenderness: There is no abdominal tenderness.   Musculoskeletal:         General: Normal range of motion.   Lymphadenopathy:      Cervical: No cervical adenopathy.   Skin:     Findings: No erythema or rash.      Comments: Multiple skin ulcers in the bilateral heel   Neurological:      Mental Status: He is alert and oriented to person, place, and time.      Deep Tendon Reflexes: Reflexes are normal and symmetric.      Comments: Wheelchair-bound   Psychiatric:         Behavior: Behavior normal.             Significant Labs: All pertinent labs within the past 24 hours have been reviewed.  CBC:   Recent Labs   Lab 03/22/24  1305 03/23/24  0435   WBC 6.07 5.73   HGB 8.1* 7.4*   HCT 24.2* 21.3*   * 106*     CMP:   Recent Labs   Lab 03/22/24  1305 03/22/24  1829 03/22/24  2317 03/23/24  0435    143 141 142   K 6.8* 6.0* 5.7* 4.9    109 107 105   CO2 8* 10* 11* 13*   GLU 77 84 84 112*   * 216* 208* 213*   CREATININE 16.2* 15.3* 14.8* 14.3*   CALCIUM 8.0* 7.9* 7.6* 7.5*   PROT 7.2  --   --  6.3   ALBUMIN  3.6  --   --  3.2*   BILITOT 0.2  --   --  0.3   ALKPHOS 71  --   --  65   AST 9*  --   --  10   ALT 12  --   --  12   ANIONGAP 25* 24* 23* 24*       Significant Imaging: I have reviewed all pertinent imaging results/findings within the past 24 hours.    No results found.- pulls last radiology orders

## 2024-03-23 NOTE — PLAN OF CARE
Goals to be met by: 2024     Patient will increase functional independence with mobility by performin. Supine to sit with MInimal Assistance  2. Sit to supine with MInimal Assistance  3. Sit to stand transfer with Minimal Assistance  4. Bed to chair transfer with Minimal Assistance using Rolling Walker  5. Lower extremity exercise program x30 reps per handout, with independence

## 2024-03-23 NOTE — H&P
[facility] Medicine  History & Physical    Patient Name: Nimisha Longoria  MRN: [id number]  Patient Class: IP- Inpatient  Admission Date: 3/22/2024  Attending Physician: Corey Rust MD   Primary Care Provider: Zackery Haddad MD         Patient information was obtained from patient, past medical records, and ER records.     Subjective:     Principal Problem:Acute renal failure    Chief Complaint:   Chief Complaint   Patient presents with    Generalized Weakness     Worsening over the past several days with decreased appetite        HPI: Nimisha Longoria is an 84 year old male with a previous medical history of anemia, CAD, HLD, HTN, sacral chordoma, right hip fracture and wheelchair bound, peripheral vascular diease, overactive bladder, and chronic foot wounds who presents to the emergency room for generalized weakness and decreased appetite for two days. Patient is wheelchair bound but normally able to transfer self and per chart review the wife of patient became concerned that she had to change his diaper in bed due to the generalized weakness. Per chart review patient was to see urology for overactive bladder symptoms and reports was put on medication Gemtessa approximately two weeks ago however patient is a poor historian and encounters show he saw Dr. Vasquez on 2/9/24. Patient endorses decreased urine output since that time but reports feeling generally unwell for one month. Initial ED evaluation shows patient is in acute renal failure with hyperkalemia. Dr. Haro consulted and on case. Patient to be admitted by hospital medicine for further evaluation and management.     Past Medical History:   Diagnosis Date    Abnormal SPEP 1/17/2021    Anemia     Anemia due to multiple mechanisms 1/17/2021    Anemia, chronic disease 1/17/2021    Coronary artery disease     Hyperlipidemia     Hypertension     Normochromic normocytic anemia 1/17/2021    Sacral chordoma        Past  Surgical History:   Procedure Laterality Date    BACK SURGERY      CORONARY ARTERY BYPASS GRAFT      HIP SURGERY  2023    INTRAMEDULLARY RODDING OF FEMUR Right 04/08/2023    Procedure: INSERTION, INTRAMEDULLARY CHUCKY, FEMUR, synthes, hana table, 1st assist;  Surgeon: Tyler Rizvi MD;  Location: [facility];  Service: Orthopedics;  Laterality: Right;    SPINE SURGERY      Dr Villatoro       Review of patient's allergies indicates:  No Known Allergies    No current facility-administered medications on file prior to encounter.     Current Outpatient Medications on File Prior to Encounter   Medication Sig    amLODIPine (NORVASC) 5 MG tablet Take 1 tablet (5 mg total) by mouth once daily.    aspirin (ECOTRIN) 81 MG EC tablet Take 2 tablets (162 mg total) by mouth once daily. Resume May 13, after  BID course finishes    GEMTESA 75 mg Tab Take 1 tablet by mouth once daily.    metoprolol succinate (TOPROL-XL) 25 MG 24 hr tablet Take 1 tablet (25 mg total) by mouth once daily.    senna-docusate 8.6-50 mg (PERICOLACE) 8.6-50 mg per tablet Take 1 tablet by mouth every 12 (twelve) hours as needed for Constipation.     Family History    None       Tobacco Use    Smoking status: Former     Current packs/day: 2.00     Average packs/day: 2.0 packs/day for 25.0 years (50.0 ttl pk-yrs)     Types: Cigarettes    Smokeless tobacco: Never    Tobacco comments:     quit 1985   Substance and Sexual Activity    Alcohol use: No    Drug use: No    Sexual activity: Not on file     Review of Systems   Constitutional:  Positive for activity change and appetite change.   HENT:  Positive for ear pain.    Gastrointestinal:  Positive for nausea.   Genitourinary:  Positive for difficulty urinating.   Musculoskeletal:  Positive for gait problem.   Neurological:  Positive for weakness.   All other systems reviewed and are negative.    Objective:     Vital Signs (Most Recent):  Temp: 97.4 °F (36.3 °C) (03/22/24 1221)  Pulse: 64 (03/22/24  2002)  Resp: 16 (03/22/24 1221)  BP: (!) 152/67 (03/22/24 2002)  SpO2: 99 % (03/22/24 2002) Vital Signs (24h Range):  Temp:  [97.4 °F (36.3 °C)] 97.4 °F (36.3 °C)  Pulse:  [59-68] 64  Resp:  [16] 16  SpO2:  [98 %-100 %] 99 %  BP: (114-152)/(58-74) 152/67     Weight: 68 kg (150 lb)  Body mass index is 22.81 kg/m².     Physical Exam  Vitals reviewed.   Constitutional:       Appearance: He is ill-appearing.   HENT:      Head: Normocephalic and atraumatic.      Right Ear: Tympanic membrane normal.      Mouth/Throat:      Mouth: Mucous membranes are dry.      Pharynx: Oropharynx is clear.   Eyes:      Extraocular Movements: Extraocular movements intact.      Pupils: Pupils are equal, round, and reactive to light.   Cardiovascular:      Rate and Rhythm: Normal rate and regular rhythm.      Pulses: Normal pulses.      Heart sounds: Normal heart sounds.   Pulmonary:      Effort: Pulmonary effort is normal.      Breath sounds: Normal breath sounds.   Abdominal:      General: Bowel sounds are normal. There is no distension.      Palpations: Abdomen is soft.      Tenderness: There is no abdominal tenderness.   Musculoskeletal:      Cervical back: Normal range of motion and neck supple. No tenderness.      Right foot: Decreased range of motion.      Left foot: Decreased range of motion.      Comments: Atrophy noted in bilateral legs with mild contracture noted in bilateral feet   Feet:      Comments: Bilateral foot ulcers and heel ulcers-please see pictures  Skin:     General: Skin is warm and dry.      Capillary Refill: Capillary refill takes less than 2 seconds.   Neurological:      Mental Status: He is alert. He is disoriented and confused.      GCS: GCS eye subscore is 4. GCS verbal subscore is 4. GCS motor subscore is 6.      Motor: Weakness and atrophy present.      Comments: Weakness and atrophy noted in bilateral lower extremities. Patient is able to move bilateral lower extremities.     Patient not oriented to year,  month and reported the president is Tray. Patient aware he is in a hospital and oriented to self and correct city. However, patient endorses  that he does ambulate in his own however upon chart review it is noted he is bedbound.    Psychiatric:         Mood and Affect: Mood normal.         Behavior: Behavior normal.            CRANIAL NERVES     CN III, IV, VI   Pupils are equal, round, and reactive to light.       Significant Labs: All pertinent labs within the past 24 hours have been reviewed.  CBC:   Recent Labs   Lab 03/22/24  1305   WBC 6.07   HGB 8.1*   HCT 24.2*   *     CMP:   Recent Labs   Lab 03/22/24  1305 03/22/24  1829    143   K 6.8* 6.0*    109   CO2 8* 10*   GLU 77 84   * 216*   CREATININE 16.2* 15.3*   CALCIUM 8.0* 7.9*   PROT 7.2  --    ALBUMIN 3.6  --    BILITOT 0.2  --    ALKPHOS 71  --    AST 9*  --    ALT 12  --    ANIONGAP 25* 24*     Lipase:   Recent Labs   Lab 03/22/24  1305   LIPASE 176*     Magnesium:   Recent Labs   Lab 03/22/24  1305   MG 2.4     Urine Studies:   Recent Labs   Lab 03/22/24  1515   COLORU Yellow   APPEARANCEUA Hazy*   PHUR 6.0   SPECGRAV 1.010   PROTEINUA 1+*   GLUCUA Negative   KETONESU Trace*   BILIRUBINUA Negative   OCCULTUA 1+*   NITRITE Negative   UROBILINOGEN Negative   LEUKOCYTESUR 3+*   RBCUA 8*   WBCUA >100*   BACTERIA Rare   HYALINECASTS 0       Significant Imaging: I have reviewed all pertinent imaging results/findings within the past 24 hours.: NONE  Assessment/Plan:     * Acute renal failure  Patient with acute renal failure likely due to  medication Gemtessa  acute renal failure is currently improving. Baseline creatinine  1.21  - Labs reviewed- Renal function/electrolytes with Estimated Creatinine Clearance: 3.4 mL/min (A) (based on SCr of 15.3 mg/dL (H)). according to latest data. Monitor urine output and serial BMP and adjust therapy as needed. Avoid nephrotoxins and renally dose meds for GFR listed above.    -Dr. Haro  consulted and on case: please refer to his note for course of care and full recommendations  -Oh cath with strict intake and output and Q shift oh care  -Gemtessa discontinued  -Serial BMP's   -If unable to lower potassium below 5 dialysis will be considered.     UTI (urinary tract infection)  Urinalysis with >100WBC with recommendations from nephrology to treat UTI    -Urine culture pending  -Ceftriaxone 1 gram IV daily      Hyperkalemia  This patient has hyperkalemia which is uncontrolled. We will monitor for arrhythmias with EKG and continuous telemetry. We will treat the hyperkalemia with Potassium Binders, IV insulin and dextrose, Nebulized albuterol sulfate, Furosemide, and Sodium Bicarbonate. The likely etiology of the hyperkalemia is  Acute renal failure .  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 Latest blood pressure and vitals reviewed-     Temp:  [97.4 °F (36.3 °C)]   Pulse:  [59-68]   Resp:  [16]   BP: (114-152)/(58-74)   SpO2:  [98 %-100 %] .   Home meds for hypertension were reviewed and noted below.   Hypertension Medications               amLODIPine (NORVASC) 5 MG tablet Take 1 tablet (5 mg total) by mouth once daily.    metoprolol succinate  (TOPROL-XL) 25 MG 24 hr tablet Take 1 tablet (25 mg total) by mouth once daily.            While in the hospital, will manage blood pressure as follows; Continue home antihypertensive regimen    Will utilize p.r.n. blood pressure medication only if patient's blood pressure greater than 180/110 and he develops symptoms such as worsening chest pain or shortness of breath.    Chordoma  Chronic condition that causes patient to be wheelchair bound    -PT/OT consulted      Anemia  Patient's anemia is currently controlled. Has not received any PRBCs to date. Etiology likely d/t chronic disease due to multiple mechanisms  Current CBC reviewed-   Lab Results   Component Value Date    HGB 8.1 (L) 03/22/2024    HCT 24.2 (L) 03/22/2024     Monitor serial CBC and transfuse if patient becomes hemodynamically unstable, symptomatic or H/H drops below 7/21.      VTE Risk Mitigation (From admission, onward)           Ordered     heparin (porcine) injection 5,000 Units  Every 8 hours         03/22/24 2221     IP VTE HIGH RISK PATIENT  Once         03/22/24 2221     Place sequential compression device  Until discontinued         03/22/24 2221                                    Jacqui Hartman NP  Department of Hospital Medicine  [facility]/Surg

## 2024-03-23 NOTE — ASSESSMENT & PLAN NOTE
Chronic, controlled. Latest blood pressure and vitals reviewed-     Temp:  [97.4 °F (36.3 °C)]   Pulse:  [59-68]   Resp:  [16]   BP: (114-152)/(58-74)   SpO2:  [98 %-100 %] .   Home meds for hypertension were reviewed and noted below.   Hypertension Medications               amLODIPine (NORVASC) 5 MG tablet Take 1 tablet (5 mg total) by mouth once daily.    metoprolol succinate (TOPROL-XL) 25 MG 24 hr tablet Take 1 tablet (25 mg total) by mouth once daily.            While in the hospital, will manage blood pressure as follows; Continue home antihypertensive regimen    Will utilize p.r.n. blood pressure medication only if patient's blood pressure greater than 180/110 and he develops symptoms such as worsening chest pain or shortness of breath.

## 2024-03-23 NOTE — PLAN OF CARE
Problem: Infection  Goal: Absence of Infection Signs and Symptoms  Outcome: Ongoing, Progressing     Problem: Adult Inpatient Plan of Care  Goal: Plan of Care Review  Outcome: Ongoing, Progressing  Goal: Patient-Specific Goal (Individualized)  Outcome: Ongoing, Progressing  Goal: Absence of Hospital-Acquired Illness or Injury  Outcome: Ongoing, Progressing  Goal: Optimal Comfort and Wellbeing  Outcome: Ongoing, Progressing  Goal: Readiness for Transition of Care  Outcome: Ongoing, Progressing     Problem: Skin Injury Risk Increased  Goal: Skin Health and Integrity  Outcome: Ongoing, Progressing     Problem: Fluid and Electrolyte Imbalance (Acute Kidney Injury/Impairment)  Goal: Fluid and Electrolyte Balance  Outcome: Ongoing, Progressing     Problem: Oral Intake Inadequate (Acute Kidney Injury/Impairment)  Goal: Optimal Nutrition Intake  Outcome: Ongoing, Progressing     Problem: Renal Function Impairment (Acute Kidney Injury/Impairment)  Goal: Effective Renal Function  Outcome: Ongoing, Progressing     Problem: Fall Injury Risk  Goal: Absence of Fall and Fall-Related Injury  Outcome: Ongoing, Progressing     Problem: Impaired Wound Healing  Goal: Optimal Wound Healing  Outcome: Ongoing, Progressing

## 2024-03-23 NOTE — PLAN OF CARE
Problem: Infection  Goal: Absence of Infection Signs and Symptoms  Outcome: Ongoing, Progressing     Problem: Adult Inpatient Plan of Care  Goal: Plan of Care Review  Outcome: Ongoing, Progressing  Goal: Patient-Specific Goal (Individualized)  Outcome: Ongoing, Progressing  Goal: Absence of Hospital-Acquired Illness or Injury  Outcome: Ongoing, Progressing  Goal: Optimal Comfort and Wellbeing  Outcome: Ongoing, Progressing  Goal: Readiness for Transition of Care  Outcome: Ongoing, Progressing     Problem: Skin Injury Risk Increased  Goal: Skin Health and Integrity  Outcome: Ongoing, Progressing     Problem: Fluid and Electrolyte Imbalance (Acute Kidney Injury/Impairment)  Goal: Fluid and Electrolyte Balance  Outcome: Ongoing, Progressing     Problem: Oral Intake Inadequate (Acute Kidney Injury/Impairment)  Goal: Optimal Nutrition Intake  Outcome: Ongoing, Progressing     Problem: Renal Function Impairment (Acute Kidney Injury/Impairment)  Goal: Effective Renal Function  Outcome: Ongoing, Progressing     Problem: Fall Injury Risk  Goal: Absence of Fall and Fall-Related Injury  Outcome: Ongoing, Progressing

## 2024-03-23 NOTE — ASSESSMENT & PLAN NOTE
This patient has hyperkalemia which is uncontrolled. We will monitor for arrhythmias with EKG and continuous telemetry. We will treat the hyperkalemia with Potassium Binders, IV insulin and dextrose, Nebulized albuterol sulfate, Furosemide, and Sodium Bicarbonate. The likely etiology of the hyperkalemia is  Acute renal failure .  The patients latest potassium has been reviewed and the results are listed below  Recent Labs   Lab 03/22/24  1829   K 6.0*       -Continue Bicarb drip at 100ml/hour   -BMP at 2330- if 5.5 or greater patient will receive 60mg of lasix IV once

## 2024-03-23 NOTE — PT/OT/SLP PROGRESS
Occupational Therapy      Patient Name:  Nimisha Longoria   MRN:  [id number]    Patient not seen today secondary to Patient unwilling to participate, Other (Comment) (Attempted OT Eval AM & PM. Pt unwilling/wants to rest. Nurse Smita vicente.). Will follow-up.    3/23/2024

## 2024-03-23 NOTE — CONSULTS
Nephrology Consult Note        Patient Name: Nimisha Longoria  MRN: [id number]    Patient Class: IP- Inpatient   Admission Date: 3/22/2024  Length of Stay: 1 days  Date of Service: 3/23/2024    Attending Physician: Chris Field MD  Primary Care Provider: Zackery Haddad MD    Reason for Consult: emghan/hyperkalemia/uremia/acidosis    SUBJECTIVE:     HPI: 84M with a past medical history of CAD, HTN, HLD,sacral chordoma presents for evaluation of multiple complaints. EMS reports the patient has had generalized weakness for approximately month. They also report that for the last 2 days the patient has had nausea and a decreased appetite but denies any associated headache, cough, congestion, fever/chills, chest pain, back pain, neck pain, shortness of breath, vomiting, abdominal pain, or diarrhea.     Notably, about a week ago was started on Gemtesa for overactive bladder. In ER had oh placed with immediate evacuation of 700cc cloudy urine. Notably hsi scr is 16, BUN > 200, K 6.8.    Per d/w with Dr. Awad we will treat hyperkalemia per protocol with bicarb drip, Lasix 80mg IV x1, Inuln + D50, Lokelma and repeat labs in 2-3h. If unable to lower K < 5, will need to consider dialysis. This plan was d/w patient and wife at bedside and they are both agreeable. Questions answered.    3/23  no vomiting, sob, or confusion.  His only complaint is left ear pain    Past Medical History:   Diagnosis Date    Abnormal SPEP 1/17/2021    Anemia     Anemia due to multiple mechanisms 1/17/2021    Anemia, chronic disease 1/17/2021    Coronary artery disease     Hyperlipidemia     Hypertension     Normochromic normocytic anemia 1/17/2021    Sacral chordoma      Past Surgical History:   Procedure Laterality Date    BACK SURGERY      CORONARY ARTERY BYPASS GRAFT      HIP SURGERY  2023    INTRAMEDULLARY RODDING OF FEMUR Right 04/08/2023    Procedure: INSERTION, INTRAMEDULLARY CHUCKY, FEMUR, synthes, hana table, 1st assist;  Surgeon:  Tyler Rizvi MD;  Location: [facility];  Service: Orthopedics;  Laterality: Right;    SPINE SURGERY      Dr Villatoro     No family history on file.  Social History     Tobacco Use    Smoking status: Former     Current packs/day: 2.00     Average packs/day: 2.0 packs/day for 25.0 years (50.0 ttl pk-yrs)     Types: Cigarettes    Smokeless tobacco: Never    Tobacco comments:     quit 1985   Substance Use Topics    Alcohol use: No    Drug use: No       Review of patient's allergies indicates:  No Known Allergies    Outpatient meds:  No current facility-administered medications on file prior to encounter.     Current Outpatient Medications on File Prior to Encounter   Medication Sig Dispense Refill    amLODIPine (NORVASC) 5 MG tablet Take 1 tablet (5 mg total) by mouth once daily. 90 tablet 3    aspirin (ECOTRIN) 81 MG EC tablet Take 2 tablets (162 mg total) by mouth once daily. Resume May 13, after  BID course finishes      GEMTESA 75 mg Tab Take 1 tablet by mouth once daily.      metoprolol succinate (TOPROL-XL) 25 MG 24 hr tablet Take 1 tablet (25 mg total) by mouth once daily. 90 tablet 1    senna-docusate 8.6-50 mg (PERICOLACE) 8.6-50 mg per tablet Take 1 tablet by mouth every 12 (twelve) hours as needed for Constipation. 60 tablet 0       Scheduled meds:   amLODIPine  5 mg Oral Daily    cefTRIAXone (Rocephin) IV (PEDS and ADULTS)  1 g Intravenous Q24H    heparin (porcine)  5,000 Units Subcutaneous Q8H    metoprolol succinate  25 mg Oral Daily       Infusions:   sodium bicarbonate 150 mEq in dextrose 5 % (D5W) 1,000 mL infusion 100 mL/hr at 03/23/24 0519       PRN meds:      Review of Systems:  Constitutional:  Negative for chills, fever, malaise/fatigue and weight loss.   HENT:  Negative for hearing loss and nosebleeds.    Eyes:  Negative for blurred vision, double vision and photophobia.   Respiratory:  Negative for cough, shortness of breath and wheezing.    Cardiovascular:  Negative for chest  pain, palpitations and leg swelling.   Gastrointestinal:  Negative for abdominal pain, constipation, diarrhea, heartburn, nausea and vomiting.   Genitourinary:  Negative for dysuria, frequency and urgency.   Musculoskeletal:  Negative for falls, joint pain and myalgias.   Skin:  Negative for itching and rash.   Neurological:  Negative for dizziness, speech change, focal weakness, loss of consciousness and headaches.   Endo/Heme/Allergies:  Does not bruise/bleed easily.   Psychiatric/Behavioral:  Negative for depression and substance abuse. The patient is not nervous/anxious.      OBJECTIVE:     Vital Signs and IO:  Temp:  [97.4 °F (36.3 °C)-97.6 °F (36.4 °C)]   Pulse:  [59-71]   Resp:  [12-29]   BP: (114-152)/(58-74)   SpO2:  [98 %-100 %]   I/O last 3 completed shifts:  In: 1000 [IV Piggyback:1000]  Out: 2775 [Urine:2775]  Wt Readings from Last 5 Encounters:   03/22/24 57.2 kg (126 lb 1.7 oz)   08/14/23 71.2 kg (157 lb)   04/23/23 71.4 kg (157 lb 6 oz)   04/27/23 71.4 kg (157 lb 6.5 oz)   04/08/23 70.8 kg (156 lb 1.4 oz)     Body mass index is 19.17 kg/m².    Physical Exam  Constitutional:       General: Patient is not in acute distress.     Appearance: Patient is well-developed. She is not diaphoretic.   HENT:      Head: Normocephalic and atraumatic.      Mouth/Throat: Mucous membranes are moist.      Pain with pressure applied to tragus   Eyes:      General: No scleral icterus.     Pupils: Pupils are equal, round, and reactive to light.   Cardiovascular:      Rate and Rhythm: Normal rate and regular rhythm.   Pulmonary:      Effort: Pulmonary effort is normal. No respiratory distress.      Breath sounds: No stridor.   Abdominal:      General: There is no distension.      Palpations: Abdomen is soft.   Musculoskeletal:         General: No deformity. Normal range of motion.      Cervical back: Neck supple.   Skin:     General: Skin is warm and dry.      Findings: No rash present. No erythema.   Neurological:       Mental Status: Patient is alert and oriented to person, place, and time.      Cranial Nerves: No cranial nerve deficit.   Psychiatric:         Behavior: Behavior normal.     Laboratory:  Recent Labs   Lab 03/22/24  1829 03/22/24  2317 03/23/24  0435    141 142   K 6.0* 5.7* 4.9    107 105   CO2 10* 11* 13*   * 208* 213*   CREATININE 15.3* 14.8* 14.3*   GLU 84 84 112*         Recent Labs   Lab 03/22/24  1305 03/22/24  1829 03/22/24  2317 03/23/24  0435   CALCIUM 8.0* 7.9* 7.6* 7.5*   ALBUMIN 3.6  --   --  3.2*   MG 2.4  --   --   --                Recent Labs   Lab 03/22/24  1606   POCTGLUCOSE 76               Recent Labs   Lab 03/22/24  1305 03/23/24  0435   WBC 6.07 5.73   HGB 8.1* 7.4*   HCT 24.2* 21.3*   * 106*   MCV 90 86   MCHC 33.5 34.7   MONO 3.3*  0.2* 6.1  0.4   EOSINOPHIL 0.5 1.2         Recent Labs   Lab 03/22/24  1305 03/23/24  0435   BILITOT 0.2 0.3   PROT 7.2 6.3   ALBUMIN 3.6 3.2*   ALKPHOS 71 65   ALT 12 12   AST 9* 10         Recent Labs   Lab 05/09/23  0850 03/22/24  1515   Color, UA Yellow Yellow   Appearance, UA Hazy A Hazy A   pH, UA 6.0 6.0   Specific [place], UA 1.020 1.010   Protein, UA 1+ A 1+ A   Glucose, UA Negative Negative   Ketones, UA Negative Trace A   Urobilinogen, UA Negative Negative   Bilirubin (UA) Negative Negative   Occult Blood UA 1+ A 1+ A   Nitrite, UA Negative Negative   RBC, UA 0 8 H   WBC, UA >100 H >100 H   Bacteria Moderate A Rare   Hyaline Casts, UA 2 A 0         Recent Labs   Lab 09/27/22  1238   Sample VENOUS         Microbiology Results (last 7 days)       Procedure Component Value Units Date/Time    Blood culture (site 1) [[id number]] Collected: 03/22/24 2317    Order Status: Sent Specimen: Blood from Peripheral, Hand, Left Updated: 03/23/24 0853    Blood culture (site 2) [[id number]] Collected: 03/22/24 8123    Order Status: Sent Specimen: Blood from Peripheral, Hand, Right Updated: 03/23/24 0853    Urine culture [[id number]] Collected:  03/22/24 1515    Order Status: Completed Specimen: Urine Updated: 03/23/24 0720     Urine Culture, Routine No growth to date    Narrative:      Specimen Source->Urine            ASSESSMENT/PLAN:     MAC due to urinary retention due to Gemtasa  Possible UTI  Hyperkalemia  Acidosis  Uremia  CKD stage 3  Anemia  HTN  No NSAIDs, ACEI/ARB, IV contrast or other nephrotoxins.  Keep MAP > 60, SBP > 100.  Dose meds for GFR < 15 ml/min.  Renal diet - low K, low phos.     Bicarb gtt at 100cc/hr + lasix 80mg IV x1  Insulin Regular IV + 1 amp of D50  Treat UTI, follow Cx.  Keep ho and monitor UO. Obviously hold Gemtesa.  If no improvement with K, will consider trialysis line placement and emergent HD.  Hgb and HCT are acceptable. Monitor for now.  BP seem controlled. Tolerate asymptomatic HTN up to -160.  Increase fluid rate given high urine output  Otic drops   I have discussed the risks and benefits of hemodialysis with the patient.  All of their questions were answered.  Risks include low blood pressure, stroke, heart attack, seizure, infection, nausea, delirium, and death.  Will hold hd given no confusion, vomiting, or asterixis.  Reassess in am    Thank you for allowing us to participate in the care of your patient!   We will follow the patient and provide recommendations as needed.    Patient care time was spent personally by me on the following activities:     Obtaining a history.  Examination of patient.  Providing medical care at the patients bedside.  Developing a treatment plan with patient or surrogate and bedside caregivers.  Ordering and reviewing laboratory studies, radiographic studies, pulse oximetry.  Ordering and performing treatments and interventions.  Evaluation of patient's response to treatment.  Discussions with consultants while on the unit and immediately available to the patient.  Re-evaluation of the patient's condition.  Documentation in the medical record.     Jason Gordon,  MD [facility]  [address] [place] [address]    [phone] - tel  [phone] - fax    3/23/2024

## 2024-03-23 NOTE — ASSESSMENT & PLAN NOTE
Patient with acute renal failure likely due to  medication Gemtessa  acute renal failure is currently improving. Baseline creatinine  1.21  - Labs reviewed- Renal function/electrolytes with Estimated Creatinine Clearance: 3.4 mL/min (A) (based on SCr of 15.3 mg/dL (H)). according to latest data. Monitor urine output and serial BMP and adjust therapy as needed. Avoid nephrotoxins and renally dose meds for GFR listed above.    -Dr. Haro consulted and on case: please refer to his note for course of care and full recommendations  -Oh cath with strict intake and output and Q shift oh care  -Gemtessa discontinued  -Serial BMP's   -If unable to lower potassium below 5 dialysis will be considered.

## 2024-03-23 NOTE — CONSULTS
[facility]  History & Physical    SUBJECTIVE:     Chief Complaint/Reason for Admission:     History of Present Illness:  Patient is a 84 y.o. male presents with decreased ability to urinate  And urinary retention  Patient with creatinine of 14 on admission    Simental was placed and several 100 cc of urine recovered  Currently urine is blood-tinged    No upper tract imaging has been done as of yet  Patient's wife says she feels his urine volume decreased since being placed on gemtesa for overactive bladder several weeks ago  Patient's baseline creatinine was 1.2    PTA Medications   Medication Sig    amLODIPine (NORVASC) 5 MG tablet Take 1 tablet (5 mg total) by mouth once daily.    aspirin (ECOTRIN) 81 MG EC tablet Take 2 tablets (162 mg total) by mouth once daily. Resume May 13, after  BID course finishes    GEMTESA 75 mg Tab Take 1 tablet by mouth once daily.    metoprolol succinate (TOPROL-XL) 25 MG 24 hr tablet Take 1 tablet (25 mg total) by mouth once daily.    senna-docusate 8.6-50 mg (PERICOLACE) 8.6-50 mg per tablet Take 1 tablet by mouth every 12 (twelve) hours as needed for Constipation.       Review of patient's allergies indicates:  No Known Allergies    Past Medical History:   Diagnosis Date    Abnormal SPEP 1/17/2021    Anemia     Anemia due to multiple mechanisms 1/17/2021    Anemia, chronic disease 1/17/2021    Coronary artery disease     Hyperlipidemia     Hypertension     Normochromic normocytic anemia 1/17/2021    Sacral chordoma      Past Surgical History:   Procedure Laterality Date    BACK SURGERY      CORONARY ARTERY BYPASS GRAFT      HIP SURGERY  2023    INTRAMEDULLARY RODDING OF FEMUR Right 04/08/2023    Procedure: INSERTION, INTRAMEDULLARY CHUCKY, FEMUR, synthes, hana table, 1st assist;  Surgeon: Tyler Rizvi MD;  Location: [facility];  Service: Orthopedics;  Laterality: Right;    SPINE SURGERY      Dr Villatoro     No family history on file.  Social History      Tobacco Use    Smoking status: Former     Current packs/day: 2.00     Average packs/day: 2.0 packs/day for 25.0 years (50.0 ttl pk-yrs)     Types: Cigarettes    Smokeless tobacco: Never    Tobacco comments:     quit 1985   Substance Use Topics    Alcohol use: No    Drug use: No        Review of Systems:    Weakness  OBJECTIVE:     Vital Signs (Most Recent):  Temp: 97.8 °F (36.6 °C) (03/23/24 1142)  Pulse: 64 (03/23/24 1200)  Resp: (!) 24 (03/23/24 1200)  BP: (!) 110/57 (03/23/24 1200)  SpO2: 100 % (03/23/24 1200)    Inpatient Medications:  Current Facility-Administered Medications   Medication Dose Route Frequency Provider Last Rate Last Admin    acetaminophen tablet 650 mg  650 mg Oral Q4H PRN Jacqui Hartman NP        aluminum-magnesium hydroxide-simethicone 200-200-20 mg/5 mL suspension 30 mL  30 mL Oral QID PRN Jacqui Hartman NP        amLODIPine tablet 5 mg  5 mg Oral Daily Jacqui Hartman NP   5 mg at 03/23/24 0808    cefTRIAXone (Rocephin) 1 g in dextrose 5 % in water (D5W) 100 mL IVPB (MB+)  1 g Intravenous Q24H Chris Field MD   Stopped at 03/23/24 0119    ciprofloxacin-dexAMETHasone 0.3-0.1% otic suspension 4 drop  4 drop Left Ear BID Jason Gordon MD   4 drop at 03/23/24 1117    dextrose 10% bolus 125 mL 125 mL  12.5 g Intravenous PRN Jacqui Hartman NP        dextrose 10% bolus 250 mL 250 mL  25 g Intravenous PRN IndiomaJacqui bonilla NP        glucagon (human recombinant) injection 1 mg  1 mg Intramuscular PRN Jacqui Hartman NP        glucose chewable tablet 16 g  16 g Oral PRN Jacqui Hartman NP        glucose chewable tablet 24 g  24 g Oral PRN Jacqui Hartman NP        heparin (porcine) injection 5,000 Units  5,000 Units Subcutaneous Q8H Jacqui Hartman NP   5,000 Units at 03/23/24 0833    metoprolol succinate (TOPROL-XL) 24 hr tablet 25 mg  25 mg Oral Daily Jacqui Hartman NP   25 mg at 03/23/24 0808    mupirocin 2 % ointment   Nasal BID Chris Field MD   Given at  03/23/24 1117    naloxone 0.4 mg/mL injection 0.02 mg  0.02 mg Intravenous PRN Jacqui Hartman, RAJESH        ondansetron injection 4 mg  4 mg Intravenous Q8H PRN Jacqui Hartman, RAJESH        prochlorperazine injection Soln 5 mg  5 mg Intravenous Q6H PRN Jacqui Hartman, RAJESH        simethicone chewable tablet 80 mg  1 tablet Oral QID PRN Jacqui Hartman NP        sodium bicarbonate 150 mEq in dextrose 5 % (D5W) 1,000 mL infusion   Intravenous Continuous TvJason anglin  mL/hr at 03/23/24 1253 New Bag at 03/23/24 1253    sodium chloride 0.9% flush 10 mL  10 mL Intravenous Q12H PRN Jacqui Hartman NP              ASSESSMENT/PLAN:     Urinary retention and renal failure      Simental catheter in place and draining  We will order CT scan of abdomen and pelvis, 0 contrast and psa

## 2024-03-23 NOTE — ASSESSMENT & PLAN NOTE
Patient's anemia is currently controlled. Has not received any PRBCs to date. Etiology likely d/t chronic disease due to multiple mechanisms  Current CBC reviewed-   Lab Results   Component Value Date    HGB 8.1 (L) 03/22/2024    HCT 24.2 (L) 03/22/2024     Monitor serial CBC and transfuse if patient becomes hemodynamically unstable, symptomatic or H/H drops below 7/21.

## 2024-03-23 NOTE — HPI
Nimisha Longoria is an 84 year old male with a previous medical history of anemia, CAD, HLD, HTN, sacral chordoma, right hip fracture and wheelchair bound, peripheral vascular diease, overactive bladder, and chronic foot wounds who presents to the emergency room for generalized weakness and decreased appetite for two days. Patient is wheelchair bound but normally able to transfer self and per chart review the wife of patient became concerned that she had to change his diaper in bed due to the generalized weakness. Per chart review patient was to see urology for overactive bladder symptoms and reports was put on medication Gemtessa approximately two weeks ago however patient is a poor historian and unable confirm this in chart review. Patient endorses decreased urine output since that time but reports feeling generally unwell for one month. Initial ED evaluation shows patient is in acute renal failure with hyperkalemia. Dr. Haro consulted and on case. Patient to be admitted by hospital medicine for further evaluation and management.

## 2024-03-23 NOTE — SUBJECTIVE & OBJECTIVE
Past Medical History:   Diagnosis Date    Abnormal SPEP 1/17/2021    Anemia     Anemia due to multiple mechanisms 1/17/2021    Anemia, chronic disease 1/17/2021    Coronary artery disease     Hyperlipidemia     Hypertension     Normochromic normocytic anemia 1/17/2021    Sacral chordoma        Past Surgical History:   Procedure Laterality Date    BACK SURGERY      CORONARY ARTERY BYPASS GRAFT      HIP SURGERY  2023    INTRAMEDULLARY RODDING OF FEMUR Right 04/08/2023    Procedure: INSERTION, INTRAMEDULLARY CHUCKY, FEMUR, synthes, hana table, 1st assist;  Surgeon: Tyler Rizvi MD;  Location: [facility];  Service: Orthopedics;  Laterality: Right;    SPINE SURGERY      Dr Villatoro       Review of patient's allergies indicates:  No Known Allergies    No current facility-administered medications on file prior to encounter.     Current Outpatient Medications on File Prior to Encounter   Medication Sig    amLODIPine (NORVASC) 5 MG tablet Take 1 tablet (5 mg total) by mouth once daily.    aspirin (ECOTRIN) 81 MG EC tablet Take 2 tablets (162 mg total) by mouth once daily. Resume May 13, after  BID course finishes    GEMTESA 75 mg Tab Take 1 tablet by mouth once daily.    metoprolol succinate (TOPROL-XL) 25 MG 24 hr tablet Take 1 tablet (25 mg total) by mouth once daily.    senna-docusate 8.6-50 mg (PERICOLACE) 8.6-50 mg per tablet Take 1 tablet by mouth every 12 (twelve) hours as needed for Constipation.     Family History    None       Tobacco Use    Smoking status: Former     Current packs/day: 2.00     Average packs/day: 2.0 packs/day for 25.0 years (50.0 ttl pk-yrs)     Types: Cigarettes    Smokeless tobacco: Never    Tobacco comments:     quit 1985   Substance and Sexual Activity    Alcohol use: No    Drug use: No    Sexual activity: Not on file     Review of Systems   Constitutional:  Positive for activity change and appetite change.   HENT:  Positive for ear pain.    Gastrointestinal:  Positive for  nausea.   Genitourinary:  Positive for difficulty urinating.   Musculoskeletal:  Positive for gait problem.   Neurological:  Positive for weakness.   All other systems reviewed and are negative.    Objective:     Vital Signs (Most Recent):  Temp: 97.4 °F (36.3 °C) (03/22/24 1221)  Pulse: 64 (03/22/24 2002)  Resp: 16 (03/22/24 1221)  BP: (!) 152/67 (03/22/24 2002)  SpO2: 99 % (03/22/24 2002) Vital Signs (24h Range):  Temp:  [97.4 °F (36.3 °C)] 97.4 °F (36.3 °C)  Pulse:  [59-68] 64  Resp:  [16] 16  SpO2:  [98 %-100 %] 99 %  BP: (114-152)/(58-74) 152/67     Weight: 68 kg (150 lb)  Body mass index is 22.81 kg/m².     Physical Exam  Vitals reviewed.   Constitutional:       Appearance: He is ill-appearing.   HENT:      Head: Normocephalic and atraumatic.      Right Ear: Tympanic membrane normal.      Mouth/Throat:      Mouth: Mucous membranes are dry.      Pharynx: Oropharynx is clear.   Eyes:      Extraocular Movements: Extraocular movements intact.      Pupils: Pupils are equal, round, and reactive to light.   Cardiovascular:      Rate and Rhythm: Normal rate and regular rhythm.      Pulses: Normal pulses.      Heart sounds: Normal heart sounds.   Pulmonary:      Effort: Pulmonary effort is normal.      Breath sounds: Normal breath sounds.   Abdominal:      General: Bowel sounds are normal. There is no distension.      Palpations: Abdomen is soft.      Tenderness: There is no abdominal tenderness.   Musculoskeletal:      Cervical back: Normal range of motion and neck supple. No tenderness.      Right foot: Decreased range of motion.      Left foot: Decreased range of motion.      Comments: Atrophy noted in bilateral legs with mild contracture noted in bilateral feet   Feet:      Comments: Bilateral foot ulcers and heel ulcers-please see pictures  Skin:     General: Skin is warm and dry.      Capillary Refill: Capillary refill takes less than 2 seconds.   Neurological:      Mental Status: He is alert. He is disoriented  and confused.      GCS: GCS eye subscore is 4. GCS verbal subscore is 4. GCS motor subscore is 6.      Motor: Weakness and atrophy present.      Comments: Weakness and atrophy noted in bilateral lower extremities. Patient is able to move bilateral lower extremities.     Patient not oriented to year, month and reported the president is Tray. Patient aware he is in a hospital and oriented to self and correct city. However, patient endorses  that he does ambulate in his own however upon chart review it is noted he is bedbound.    Psychiatric:         Mood and Affect: Mood normal.         Behavior: Behavior normal.            CRANIAL NERVES     CN III, IV, VI   Pupils are equal, round, and reactive to light.       Significant Labs: All pertinent labs within the past 24 hours have been reviewed.  CBC:   Recent Labs   Lab 03/22/24  1305   WBC 6.07   HGB 8.1*   HCT 24.2*   *     CMP:   Recent Labs   Lab 03/22/24  1305 03/22/24  1829    143   K 6.8* 6.0*    109   CO2 8* 10*   GLU 77 84   * 216*   CREATININE 16.2* 15.3*   CALCIUM 8.0* 7.9*   PROT 7.2  --    ALBUMIN 3.6  --    BILITOT 0.2  --    ALKPHOS 71  --    AST 9*  --    ALT 12  --    ANIONGAP 25* 24*     Lipase:   Recent Labs   Lab 03/22/24  1305   LIPASE 176*     Magnesium:   Recent Labs   Lab 03/22/24  1305   MG 2.4     Urine Studies:   Recent Labs   Lab 03/22/24  1515   COLORU Yellow   APPEARANCEUA Hazy*   PHUR 6.0   SPECGRAV 1.010   PROTEINUA 1+*   GLUCUA Negative   KETONESU Trace*   BILIRUBINUA Negative   OCCULTUA 1+*   NITRITE Negative   UROBILINOGEN Negative   LEUKOCYTESUR 3+*   RBCUA 8*   WBCUA >100*   BACTERIA Rare   HYALINECASTS 0       Significant Imaging: I have reviewed all pertinent imaging results/findings within the past 24 hours.: NONE

## 2024-03-23 NOTE — PLAN OF CARE
[facility] - Med/Surg  Initial Discharge Assessment       Primary Care Provider: Zackery Haddad MD    Admission Diagnosis: Acute renal failure, unspecified acute renal failure type [N17.9]    Admission Date: 3/22/2024  Expected Discharge Date:     Transition of Care Barriers: Other (see comments) (too weak to go home)    Payor: [employer] [facility] / Plan: [employer] CHOICES / Product Type: Medicare Advantage /     Extended Emergency Contact Information  Primary Emergency Contact: Karie Longoria  Address: [address]           [place], [place] [id number] [place] [address]  Home Phone: [phone]  Relation: Spouse  Secondary Emergency Contact: Mesha Morgan  Mobile Phone: [phone]  Relation: Daughter  Preferred language: English   needed? No    Discharge Plan A: Home Health  Discharge Plan B: Home Health    Spouse reports that patient is current with [facility] and plans to return home with [facility] unless too weak to manage him at home. Patient has been to [facility] for skilled therapy in the past. Per spouse, patient nonambulatory for 10 months since he broke his hip and was dx with bladder tumor. Patient requires assistance with all ADL's and with wheelchair transfers.       [facility] - [place], [place] - [address]  [address]  [place] [address]  Phone: [phone] Fax: [phone]    [id number] - [place] - [address]  [address]  [place] [address]  Phone: [phone] Fax: [phone]    [id number] - [place] - [address]  [address] [place] [address]  Phone: [phone] Fax: [phone]      Initial Assessment (most recent)       Adult Discharge Assessment - 03/23/24 1606          Discharge Assessment    Assessment Type Discharge Planning Assessment     Confirmed Demographics Correct on Facesheet     Source of Information patient;family     People in Home spouse      Do you expect to return to your current living situation? Yes     Prior to hospitilization cognitive status: Alert/Oriented     Current cognitive status: Alert/Oriented     Mobility Management wheelchair     Dressing/Bathing Difficulty yes     Dressing/Bathing bathing difficulty, assistance 1 person     Home Accessibility wheelchair accessible     Equipment Currently Used at Home wheelchair     Discharge Plan A Home Health     Discharge Plan B Home Health     DME Needed Upon Discharge  none     Discharge Plan discussed with: Spouse/sig other     Transition of Care Barriers Other (see comments)   too weak to go home

## 2024-03-23 NOTE — PROGRESS NOTES
[facility] Medicine  Progress Note    Patient Name: Nimisha Longoria  MRN: [id number]  Patient Class: IP- Inpatient   Admission Date: 3/22/2024  Length of Stay: 1 days  Attending Physician: Chris Field MD  Primary Care Provider: Zackery Haddad MD        Subjective:     Principal Problem:Acute renal failure        HPI:  Nimisha Longoria is an 84 year old male with a previous medical history of anemia, CAD, HLD, HTN, sacral chordoma, right hip fracture and wheelchair bound, peripheral vascular diease, overactive bladder, and chronic foot wounds who presents to the emergency room for generalized weakness and decreased appetite for two days. Patient is wheelchair bound but normally able to transfer self and per chart review the wife of patient became concerned that she had to change his diaper in bed due to the generalized weakness. Per chart review patient was to see urology for overactive bladder symptoms and reports was put on medication Gemtessa approximately two weeks ago however patient is a poor historian and unable confirm this in chart review. Patient endorses decreased urine output since that time but reports feeling generally unwell for one month. Initial ED evaluation shows patient is in acute renal failure with hyperkalemia. Dr. Haro consulted and on case. Patient to be admitted by hospital medicine for further evaluation and management.     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 Vasquez on 2/9/24.     Patient is seen in the multidisciplinary rounds, patient is more  awake alert, no fever or chills, no nausea vomiting diarrhea, excellent urine output, no active bleeding, Simental catheter placed in the ER, has around 2.7 L urine output overnight.     Review of Systems   Constitutional:  Positive for fatigue. Negative for activity change and fever.   HENT:  Negative for ear discharge, facial swelling and sore throat.    Eyes:  Negative for photophobia, pain and visual disturbance.   Respiratory:  Negative for apnea, cough and shortness of breath.    Cardiovascular:  Negative for chest pain and leg swelling.   Gastrointestinal:  Negative for abdominal pain and blood in stool.   Endocrine: Negative for cold intolerance and heat intolerance.   Musculoskeletal:  Negative for back pain and gait problem.   Skin:  Negative for pallor and rash.   Neurological:  Negative for speech difficulty and headaches.   Psychiatric/Behavioral:  Negative for confusion, hallucinations and suicidal ideas.    All other systems reviewed and are negative.    Objective:     Vital Signs (Most Recent):  Temp: 97.6 °F (36.4 °C) (03/23/24 0900)  Pulse: 62 (03/23/24 1100)  Resp: (!) 22 (03/23/24 1100)  BP: 137/67 (03/23/24 1100)  SpO2: 100 % (03/23/24 1100) Vital Signs (24h Range):  Temp:  [97.4 °F (36.3 °C)-97.6 °F (36.4 °C)] 97.6 °F (36.4 °C)  Pulse:  [59-71] 62  Resp:  [12-29] 22  SpO2:  [98 %-100 %] 100 %  BP: (114-152)/(58-74) 137/67     Weight: 57.2 kg (126 lb 1.7 oz)  Body mass index is 19.17 kg/m².    Intake/Output Summary (Last 24 hours) at 3/23/2024 1114  Last data filed at 3/23/2024 0839  Gross per 24 hour   Intake 1150 ml   Output 3525 ml   Net -2375 ml         Physical Exam  Vitals and nursing note reviewed.   Constitutional:       General: He is not in acute distress.     Appearance: He is not diaphoretic.   HENT:      Head: Normocephalic.   Eyes:      General: No scleral icterus.        Right eye: No discharge.         Left eye: No discharge.      Conjunctiva/sclera: Conjunctivae normal.   Neck:       Vascular: No JVD.   Cardiovascular:      Rate and Rhythm: Normal rate and regular rhythm.      Heart sounds: Normal heart sounds. No murmur heard.     No friction rub.   Pulmonary:      Effort: Pulmonary effort is normal. No respiratory distress.      Breath sounds: Normal breath sounds. No wheezing.   Abdominal:      General: Bowel sounds are normal. There is no distension.      Palpations: Abdomen is soft.      Tenderness: There is no abdominal tenderness.   Musculoskeletal:         General: Normal range of motion.   Lymphadenopathy:      Cervical: No cervical adenopathy.   Skin:     Findings: No erythema or rash.      Comments: Multiple skin ulcers in the bilateral heel   Neurological:      Mental Status: He is alert and oriented to person, place, and time.      Deep Tendon Reflexes: Reflexes are normal and symmetric.      Comments: Wheelchair-bound   Psychiatric:         Behavior: Behavior normal.             Significant Labs: All pertinent labs within the past 24 hours have been reviewed.  CBC:   Recent Labs   Lab 03/22/24  1305 03/23/24  0435   WBC 6.07 5.73   HGB 8.1* 7.4*   HCT 24.2* 21.3*   * 106*     CMP:   Recent Labs   Lab 03/22/24  1305 03/22/24  1829 03/22/24  2317 03/23/24  0435    143 141 142   K 6.8* 6.0* 5.7* 4.9    109 107 105   CO2 8* 10* 11* 13*   GLU 77 84 84 112*   * 216* 208* 213*   CREATININE 16.2* 15.3* 14.8* 14.3*   CALCIUM 8.0* 7.9* 7.6* 7.5*   PROT 7.2  --   --  6.3   ALBUMIN 3.6  --   --  3.2*   BILITOT 0.2  --   --  0.3   ALKPHOS 71  --   --  65   AST 9*  --   --  10   ALT 12  --   --  12   ANIONGAP 25* 24* 23* 24*       Significant Imaging: I have reviewed all pertinent imaging results/findings within the past 24 hours.    No results found.- pulls last radiology orders      Assessment/Plan:      * Acute renal failure  Patient with acute renal failure likely due to acute urinary retention secondary to  medication Gemtessa  acute renal failure is currently  improving. Baseline creatinine  1.21  - Labs reviewed- Renal function/electrolytes with Estimated Creatinine Clearance: 3.1 mL/min (A) (based on SCr of 14.3 mg/dL (H)). according to latest data. Monitor urine output and serial BMP and adjust therapy as needed. Avoid nephrotoxins and renally dose meds for GFR listed above.    Followed nephrologist  -Oh cath with strict intake and output and Q shift oh care  -Gemtessa discontinued  -Serial BMP's   - consult neurologist  - add on Flomax        UTI (urinary tract infection)  Urinalysis with >100WBC with recommendations from nephrology to treat UTI    -Urine culture pending  -Ceftriaxone 1 gram IV daily      Hyperkalemia  This patient has hyperkalemia which is uncontrolled. We will monitor for arrhythmias with EKG and continuous telemetry. We will treat the hyperkalemia with Potassium Binders, IV insulin and dextrose, Nebulized albuterol sulfate, Furosemide, and Sodium Bicarbonate. The likely etiology of the hyperkalemia is  Acute renal failure .  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 Latest blood pressure and vitals reviewed-     Temp:  [97.4 °F (36.3 °C)]   Pulse:  [59-68]   Resp:  [16]   BP: (114-152)/(58-74)    SpO2:  [98 %-100 %] .   Home meds for hypertension were reviewed and noted below.   Hypertension Medications               amLODIPine (NORVASC) 5 MG tablet Take 1 tablet (5 mg total) by mouth once daily.    metoprolol succinate (TOPROL-XL) 25 MG 24 hr tablet Take 1 tablet (25 mg total) by mouth once daily.            While in the hospital, will manage blood pressure as follows; Continue home antihypertensive regimen    Will utilize p.r.n. blood pressure medication only if patient's blood pressure greater than 180/110 and he develops symptoms such as worsening chest pain or shortness of breath.    Chordoma  Chronic condition that causes patient to be wheelchair bound    -PT/OT consulted      Anemia  Patient's anemia is currently controlled. Has not received any PRBCs to date. Etiology likely d/t chronic disease due to multiple mechanisms  Current CBC reviewed-   Lab Results   Component Value Date    HGB 8.1 (L) 03/22/2024    HCT 24.2 (L) 03/22/2024     Monitor serial CBC and transfuse if patient becomes hemodynamically unstable, symptomatic or H/H drops below 7/21.      VTE Risk Mitigation (From admission, onward)           Ordered     heparin (porcine) injection 5,000 Units  Every 8 hours         03/22/24 2221     IP VTE HIGH RISK PATIENT  Once         03/22/24 2221     Place sequential compression device  Until discontinued         03/22/24 2221                    Discharge Planning   DARYL:      Code Status: Full Code   Is the patient medically ready for discharge?:     Reason for patient still in hospital (select all that apply): Patient trending condition and Treatment                     Chris Field MD  Department of Hospital Medicine   [facility]/Surg

## 2024-03-23 NOTE — ASSESSMENT & PLAN NOTE
Patient with acute renal failure likely due to acute urinary retention secondary to  medication Gemtessa  acute renal failure is currently improving. Baseline creatinine  1.21  - Labs reviewed- Renal function/electrolytes with Estimated Creatinine Clearance: 3.1 mL/min (A) (based on SCr of 14.3 mg/dL (H)). according to latest data. Monitor urine output and serial BMP and adjust therapy as needed. Avoid nephrotoxins and renally dose meds for GFR listed above.    Followed nephrologist  -Oh cath with strict intake and output and Q shift oh care  -Gemtessa discontinued  -Serial BMP's   - consult neurologist  - add on Flomax

## 2024-03-24 LAB
ABO + RH BLD: NORMAL
ALBUMIN SERPL BCP-MCNC: 2.9 G/DL (ref 3.5–5.2)
ANION GAP SERPL CALC-SCNC: 22 MMOL/L (ref 8–16)
BACTERIA UR CULT: ABNORMAL
BASOPHILS # BLD AUTO: 0.02 K/UL (ref 0–0.2)
BASOPHILS NFR BLD: 0.4 % (ref 0–1.9)
BLD GP AB SCN CELLS X3 SERPL QL: NORMAL
BLD PROD TYP BPU: NORMAL
BLD PROD TYP BPU: NORMAL
BLOOD UNIT EXPIRATION DATE: NORMAL
BLOOD UNIT EXPIRATION DATE: NORMAL
BLOOD UNIT TYPE CODE: 5100
BLOOD UNIT TYPE CODE: 9500
BLOOD UNIT TYPE: NORMAL
BLOOD UNIT TYPE: NORMAL
BNP SERPL-MCNC: 738 PG/ML (ref 0–99)
BUN SERPL-MCNC: 180 MG/DL (ref 8–23)
CALCIUM SERPL-MCNC: 6.6 MG/DL (ref 8.7–10.5)
CHLORIDE SERPL-SCNC: 94 MMOL/L (ref 95–110)
CO2 SERPL-SCNC: 28 MMOL/L (ref 23–29)
CODING SYSTEM: NORMAL
CODING SYSTEM: NORMAL
CREAT SERPL-MCNC: 13.5 MG/DL (ref 0.5–1.4)
CROSSMATCH INTERPRETATION: NORMAL
CROSSMATCH INTERPRETATION: NORMAL
DIFFERENTIAL METHOD BLD: ABNORMAL
DISPENSE STATUS: NORMAL
DISPENSE STATUS: NORMAL
EOSINOPHIL # BLD AUTO: 0.1 K/UL (ref 0–0.5)
EOSINOPHIL NFR BLD: 1.7 % (ref 0–8)
ERYTHROCYTE [DISTWIDTH] IN BLOOD BY AUTOMATED COUNT: 14.4 % (ref 11.5–14.5)
EST. GFR  (NO RACE VARIABLE): 3 ML/MIN/1.73 M^2
FOLATE SERPL-MCNC: 4.5 NG/ML (ref 4–24)
GLUCOSE SERPL-MCNC: 138 MG/DL (ref 70–110)
HCT VFR BLD AUTO: 20.1 % (ref 40–54)
HGB BLD-MCNC: 7.2 G/DL (ref 14–18)
IMM GRANULOCYTES # BLD AUTO: 0.01 K/UL (ref 0–0.04)
IMM GRANULOCYTES NFR BLD AUTO: 0.2 % (ref 0–0.5)
IRON SERPL-MCNC: 83 UG/DL (ref 45–160)
LYMPHOCYTES # BLD AUTO: 0.6 K/UL (ref 1–4.8)
LYMPHOCYTES NFR BLD: 11.7 % (ref 18–48)
MAGNESIUM SERPL-MCNC: 1.6 MG/DL (ref 1.6–2.6)
MCH RBC QN AUTO: 30.6 PG (ref 27–31)
MCHC RBC AUTO-ENTMCNC: 35.8 G/DL (ref 32–36)
MCV RBC AUTO: 86 FL (ref 82–98)
MONOCYTES # BLD AUTO: 0.3 K/UL (ref 0.3–1)
MONOCYTES NFR BLD: 5.7 % (ref 4–15)
NEUTROPHILS # BLD AUTO: 3.8 K/UL (ref 1.8–7.7)
NEUTROPHILS NFR BLD: 80.3 % (ref 38–73)
NRBC BLD-RTO: 0 /100 WBC
NUM UNITS TRANS PACKED RBC: NORMAL
NUM UNITS TRANS PACKED RBC: NORMAL
PHOSPHATE SERPL-MCNC: 8.5 MG/DL (ref 2.7–4.5)
PLATELET # BLD AUTO: 98 K/UL (ref 150–450)
PMV BLD AUTO: 11.5 FL (ref 9.2–12.9)
POTASSIUM SERPL-SCNC: 3.4 MMOL/L (ref 3.5–5.1)
RBC # BLD AUTO: 2.35 M/UL (ref 4.6–6.2)
SATURATED IRON: 49 % (ref 20–50)
SODIUM SERPL-SCNC: 144 MMOL/L (ref 136–145)
SPECIMEN OUTDATE: NORMAL
TOTAL IRON BINDING CAPACITY: 171 UG/DL (ref 250–450)
TRANSFERRIN SERPL-MCNC: 122 MG/DL (ref 200–375)
VIT B12 SERPL-MCNC: 1040 PG/ML (ref 210–950)
WBC # BLD AUTO: 4.77 K/UL (ref 3.9–12.7)

## 2024-03-24 PROCEDURE — 20600001 HC STEP DOWN PRIVATE ROOM

## 2024-03-24 PROCEDURE — 83735 ASSAY OF MAGNESIUM: CPT | Performed by: INTERNAL MEDICINE

## 2024-03-24 PROCEDURE — 86706 HEP B SURFACE ANTIBODY: CPT | Performed by: INTERNAL MEDICINE

## 2024-03-24 PROCEDURE — 36415 COLL VENOUS BLD VENIPUNCTURE: CPT | Performed by: INTERNAL MEDICINE

## 2024-03-24 PROCEDURE — 25000003 PHARM REV CODE 250: Performed by: HOSPITALIST

## 2024-03-24 PROCEDURE — P9016 RBC LEUKOCYTES REDUCED: HCPCS | Performed by: INTERNAL MEDICINE

## 2024-03-24 PROCEDURE — 63600175 PHARM REV CODE 636 W HCPCS: Performed by: INTERNAL MEDICINE

## 2024-03-24 PROCEDURE — 25000003 PHARM REV CODE 250

## 2024-03-24 PROCEDURE — 87340 HEPATITIS B SURFACE AG IA: CPT | Performed by: INTERNAL MEDICINE

## 2024-03-24 PROCEDURE — 94761 N-INVAS EAR/PLS OXIMETRY MLT: CPT

## 2024-03-24 PROCEDURE — 63600175 PHARM REV CODE 636 W HCPCS

## 2024-03-24 PROCEDURE — 36430 TRANSFUSION BLD/BLD COMPNT: CPT

## 2024-03-24 PROCEDURE — 82746 ASSAY OF FOLIC ACID SERUM: CPT | Performed by: HOSPITALIST

## 2024-03-24 PROCEDURE — 86850 RBC ANTIBODY SCREEN: CPT | Performed by: INTERNAL MEDICINE

## 2024-03-24 PROCEDURE — 63600175 PHARM REV CODE 636 W HCPCS: Performed by: HOSPITALIST

## 2024-03-24 PROCEDURE — 86704 HEP B CORE ANTIBODY TOTAL: CPT | Performed by: INTERNAL MEDICINE

## 2024-03-24 PROCEDURE — 86920 COMPATIBILITY TEST SPIN: CPT | Performed by: INTERNAL MEDICINE

## 2024-03-24 PROCEDURE — 85025 COMPLETE CBC W/AUTO DIFF WBC: CPT | Performed by: INTERNAL MEDICINE

## 2024-03-24 PROCEDURE — 83880 ASSAY OF NATRIURETIC PEPTIDE: CPT | Performed by: INTERNAL MEDICINE

## 2024-03-24 PROCEDURE — 83540 ASSAY OF IRON: CPT | Performed by: HOSPITALIST

## 2024-03-24 PROCEDURE — 82607 VITAMIN B-12: CPT | Performed by: HOSPITALIST

## 2024-03-24 PROCEDURE — 25000242 PHARM REV CODE 250 ALT 637 W/ HCPCS: Performed by: INTERNAL MEDICINE

## 2024-03-24 PROCEDURE — 80069 RENAL FUNCTION PANEL: CPT | Performed by: INTERNAL MEDICINE

## 2024-03-24 PROCEDURE — 25000003 PHARM REV CODE 250: Performed by: INTERNAL MEDICINE

## 2024-03-24 RX ORDER — FLUTICASONE PROPIONATE 50 MCG
1 SPRAY, SUSPENSION (ML) NASAL 2 TIMES DAILY
Status: DISCONTINUED | OUTPATIENT
Start: 2024-03-24 | End: 2024-03-26 | Stop reason: HOSPADM

## 2024-03-24 RX ORDER — FUROSEMIDE 10 MG/ML
40 INJECTION INTRAMUSCULAR; INTRAVENOUS ONCE
Status: COMPLETED | OUTPATIENT
Start: 2024-03-24 | End: 2024-03-24

## 2024-03-24 RX ORDER — FLUTICASONE PROPIONATE 50 MCG
2 SPRAY, SUSPENSION (ML) NASAL DAILY
Status: DISCONTINUED | OUTPATIENT
Start: 2024-03-24 | End: 2024-03-24

## 2024-03-24 RX ORDER — SODIUM CHLORIDE 9 MG/ML
INJECTION, SOLUTION INTRAVENOUS CONTINUOUS
Status: DISCONTINUED | OUTPATIENT
Start: 2024-03-24 | End: 2024-03-25

## 2024-03-24 RX ADMIN — SODIUM CHLORIDE: 9 INJECTION, SOLUTION INTRAVENOUS at 09:03

## 2024-03-24 RX ADMIN — MUPIROCIN: 20 OINTMENT TOPICAL at 10:03

## 2024-03-24 RX ADMIN — HEPARIN SODIUM 5000 UNITS: 5000 INJECTION, SOLUTION INTRAVENOUS; SUBCUTANEOUS at 09:03

## 2024-03-24 RX ADMIN — CIPROFLOXACIN AND DEXAMETHASONE 4 DROP: 3; 1 SUSPENSION/ DROPS AURICULAR (OTIC) at 09:03

## 2024-03-24 RX ADMIN — MUPIROCIN: 20 OINTMENT TOPICAL at 09:03

## 2024-03-24 RX ADMIN — CIPROFLOXACIN AND DEXAMETHASONE 4 DROP: 3; 1 SUSPENSION/ DROPS AURICULAR (OTIC) at 10:03

## 2024-03-24 RX ADMIN — FUROSEMIDE 40 MG: 10 INJECTION, SOLUTION INTRAMUSCULAR; INTRAVENOUS at 10:03

## 2024-03-24 RX ADMIN — HEPARIN SODIUM 5000 UNITS: 5000 INJECTION, SOLUTION INTRAVENOUS; SUBCUTANEOUS at 10:03

## 2024-03-24 RX ADMIN — CEFTRIAXONE SODIUM 1 G: 1 INJECTION, POWDER, FOR SOLUTION INTRAMUSCULAR; INTRAVENOUS at 09:03

## 2024-03-24 RX ADMIN — POTASSIUM BICARBONATE 20 MEQ: 391 TABLET, EFFERVESCENT ORAL at 10:03

## 2024-03-24 RX ADMIN — METOPROLOL SUCCINATE 25 MG: 25 TABLET, EXTENDED RELEASE ORAL at 10:03

## 2024-03-24 RX ADMIN — AMLODIPINE BESYLATE 5 MG: 5 TABLET ORAL at 10:03

## 2024-03-24 RX ADMIN — HEPARIN SODIUM 5000 UNITS: 5000 INJECTION, SOLUTION INTRAVENOUS; SUBCUTANEOUS at 04:03

## 2024-03-24 RX ADMIN — FLUTICASONE PROPIONATE 50 MCG: 50 SPRAY, METERED NASAL at 04:03

## 2024-03-24 NOTE — PROGRESS NOTES
[facility] Medicine  Progress Note    Patient Name: Nimisha Longoria  MRN: [id number]  Patient Class: IP- Inpatient   Admission Date: 3/22/2024  Length of Stay: 2 days  Attending Physician: Chris Field MD  Primary Care Provider: Zackery Haddad MD        Subjective:     Principal Problem:Acute renal failure        HPI:  Nimisha Longoria is an 84 year old male with a previous medical history of anemia, CAD, HLD, HTN, sacral chordoma, right hip fracture and wheelchair bound, peripheral vascular diease, overactive bladder, and chronic foot wounds who presents to the emergency room for generalized weakness and decreased appetite for two days. Patient is wheelchair bound but normally able to transfer self and per chart review the wife of patient became concerned that she had to change his diaper in bed due to the generalized weakness. Per chart review patient was to see urology for overactive bladder symptoms and reports was put on medication Gemtessa approximately two weeks ago however patient is a poor historian and unable confirm this in chart review. Patient endorses decreased urine output since that time but reports feeling generally unwell for one month. Initial ED evaluation shows patient is in acute renal failure with hyperkalemia. Dr. Haro consulted and on case. Patient to be admitted by hospital medicine for further evaluation and management.     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 Vasquez on 2/9/24.     Patient resting.  On intravenous sodium bicarbonate infusion.   Nephrology team and urology teams are closely following.  Patient continues to have significant urine output.  Currently afebrile.  No immediate plans for hemodialysis treatment.    Review of Systems   Constitutional:  Positive for fatigue. Negative for activity change and fever.   HENT:  Negative for ear discharge, facial swelling and sore throat.    Eyes:  Negative for photophobia, pain and visual disturbance.   Respiratory:  Negative for apnea, cough and shortness of breath.    Cardiovascular:  Negative for chest pain and leg swelling.   Gastrointestinal:  Negative for abdominal pain and blood in stool.   Endocrine: Negative for cold intolerance and heat intolerance.   Musculoskeletal:  Negative for back pain and gait problem.   Skin:  Negative for pallor and rash.   Neurological:  Negative for speech difficulty and headaches.   Psychiatric/Behavioral:  Negative for confusion, hallucinations and suicidal ideas.    All other systems reviewed and are negative.    Objective:     Vital Signs (Most Recent):  Temp: 97.7 °F (36.5 °C) (03/24/24 0300)  Pulse: 62 (03/24/24 0818)  Resp: 16 (03/24/24 0818)  BP: (!) 117/56 (03/24/24 0800)  SpO2: 100 % (03/24/24 0818) Vital Signs (24h Range):  Temp:  [97.4 °F (36.3 °C)-97.9 °F (36.6 °C)] 97.7 °F (36.5 °C)  Pulse:  [61-70] 62  Resp:  [11-24] 16  SpO2:  [96 %-100 %] 100 %  BP: (110-144)/(56-70) 117/56     Weight: 57.2 kg (126 lb 1.7 oz)  Body mass index is 19.17 kg/m².    Intake/Output Summary (Last 24 hours) at 3/24/2024 0842  Last data filed at 3/24/2024 0545  Gross per 24 hour   Intake 3641.64 ml   Output 3775 ml   Net -133.36 ml           Physical Exam  Vitals and nursing note reviewed.   Constitutional:       General: He is not in acute distress.     Appearance: He is not diaphoretic.   HENT:      Head: Normocephalic.   Eyes:      General: No scleral icterus.        Right eye: No discharge.         Left eye: No discharge.      Conjunctiva/sclera: Conjunctivae normal.    Neck:      Vascular: No JVD.   Cardiovascular:      Rate and Rhythm: Normal rate and regular rhythm.      Heart sounds: Normal heart sounds. No murmur heard.     No friction rub.   Pulmonary:      Effort: Pulmonary effort is normal. No respiratory distress.      Breath sounds: Normal breath sounds. No wheezing.   Abdominal:      General: Bowel sounds are normal. There is no distension.      Palpations: Abdomen is soft.      Tenderness: There is no abdominal tenderness.   Musculoskeletal:         General: Normal range of motion.   Lymphadenopathy:      Cervical: No cervical adenopathy.   Skin:     Findings: No erythema or rash.      Comments: Multiple skin ulcers in the bilateral heel   Neurological:      Mental Status: He is alert and oriented to person, place, and time.      Deep Tendon Reflexes: Reflexes are normal and symmetric.      Comments: Wheelchair-bound   Psychiatric:         Behavior: Behavior normal.             Significant Labs: All pertinent labs within the past 24 hours have been reviewed.  CBC:   Recent Labs   Lab 03/22/24  1305 03/23/24  0435 03/24/24  0437   WBC 6.07 5.73 4.77   HGB 8.1* 7.4* 7.2*   HCT 24.2* 21.3* 20.1*   * 106* 98*       CMP:   Recent Labs   Lab 03/22/24  1305 03/22/24  1829 03/22/24  2317 03/23/24  0435 03/24/24  0437      < > 141 142 144   K 6.8*   < > 5.7* 4.9 3.4*      < > 107 105 94*   CO2 8*   < > 11* 13* 28   GLU 77   < > 84 112* 138*   *   < > 208* 213* 180*   CREATININE 16.2*   < > 14.8* 14.3* 13.5*   CALCIUM 8.0*   < > 7.6* 7.5* 6.6*   PROT 7.2  --   --  6.3  --    ALBUMIN 3.6  --   --  3.2* 2.9*   BILITOT 0.2  --   --  0.3  --    ALKPHOS 71  --   --  65  --    AST 9*  --   --  10  --    ALT 12  --   --  12  --    ANIONGAP 25*   < > 23* 24* 22*    < > = values in this interval not displayed.       Microbiology Results (last 7 days)       Procedure Component Value Units Date/Time    Urine culture [[id number]]  (Abnormal) Collected: 03/22/24  1515    Order Status: Completed Specimen: Urine Updated: 03/24/24 0710     Urine Culture, Routine COAGULASE-NEGATIVE STAPHYLOCOCCUS SPECIES  > 100,000 cfu/ml  Susceptibility testing not routinely performed.      Narrative:      Specimen Source->Urine    Blood culture (site 1) [[id number]] Collected: 03/22/24 2317    Order Status: Completed Specimen: Blood from Peripheral, Hand, Left Updated: 03/23/24 1558     Blood Culture, Routine No Growth to date    Narrative:      Site # 1, aerobic and anaerobic    Blood culture (site 2) [[id number]] Collected: 03/22/24 2317    Order Status: Completed Specimen: Blood from Peripheral, Hand, Right Updated: 03/23/24 1558     Blood Culture, Routine No Growth to date    Narrative:      Site # 2, aerobic only          Significant Imaging: I have reviewed all pertinent imaging results/findings within the past 24 hours.    No results found.- pulls last radiology orders  CT renal stone study: Pending.     Assessment/Plan:      * Acute renal failure  Patient with acute renal failure likely due to acute urinary retention secondary to  medication Gemtessa  acute renal failure is currently improving. Baseline creatinine  1.21  - Labs reviewed- Renal function/electrolytes with Estimated Creatinine Clearance: 3.2 mL/min (A) (based on SCr of 13.5 mg/dL (H)). according to latest data. Monitor urine output and serial BMP and adjust therapy as needed. Avoid nephrotoxins and renally dose meds for GFR listed above.    Followed nephrologist  -Oh cath with strict intake and output and Q shift oh care  -Gemtessa discontinued  -Serial BMP's   - follow urology recommendations and CT abdomen and pelvis stone protocol results.  - add on Flomax        UTI (urinary tract infection)  Urinalysis with >100WBC with recommendations from nephrology to treat UTI    -Urine culture pending  -Ceftriaxone 1 gram IV daily      Hyperkalemia  This patient has hyperkalemia which is uncontrolled. We will monitor for  arrhythmias with EKG and continuous telemetry. We will treat the hyperkalemia with Potassium Binders, IV insulin and dextrose, Nebulized albuterol sulfate, Furosemide, and Sodium Bicarbonate. The likely etiology of the hyperkalemia is  Acute renal failure .  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 Latest blood pressure and vitals reviewed-     Temp:  [97.4 °F (36.3 °C)-97.9 °F (36.6 °C)]   Pulse:  [61-70]   Resp:  [11-24]   BP: (110-144)/(56-70)   SpO2:  [96 %-100 %] .   Home meds for hypertension were reviewed and noted below.   Hypertension Medications               amLODIPine (NORVASC) 5 MG tablet Take 1 tablet (5 mg total) by mouth once daily.    metoprolol succinate (TOPROL-XL) 25 MG 24 hr tablet Take 1 tablet (25 mg total) by mouth once daily.            While in the hospital, will manage blood pressure as follows; Continue home antihypertensive regimen    Will utilize p.r.n. blood pressure medication only if patient's blood pressure greater than 180/110 and he develops symptoms such as worsening chest pain or shortness of breath.    Chordoma  Chronic condition that causes patient to be wheelchair bound    -PT/OT consulted      Anemia  Patient's anemia is  currently controlled. Has not received any PRBCs to date. Etiology likely d/t chronic disease due to multiple mechanisms  Current CBC reviewed-   Lab Results   Component Value Date    HGB 7.2 (L) 03/24/2024    HCT 20.1 (LL) 03/24/2024     Monitor serial CBC and transfuse if patient becomes hemodynamically unstable, symptomatic or H/H drops below 7/21.      VTE Risk Mitigation (From admission, onward)           Ordered     heparin (porcine) injection 5,000 Units  Every 8 hours         03/22/24 2221     IP VTE HIGH RISK PATIENT  Once         03/22/24 2221     Place sequential compression device  Until discontinued         03/22/24 2221                    Discharge Planning   DARYL: TBD      Code Status: DNR   Is the patient medically ready for discharge?:     Reason for patient still in hospital (select all that apply): Patient trending condition and Consult recommendations  Discharge Plan A: Home Health                  Ruth Romo MD  Department of Hospital Medicine   [facility]/Surg

## 2024-03-24 NOTE — PROGRESS NOTES
[facility] Medicine  Progress Note    Patient Name: Nimisha Longoria  MRN: [id number]  Patient Class: IP- Inpatient   Admission Date: 3/22/2024  Length of Stay: 2 days  Attending Physician: Chris Field MD  Primary Care Provider: Zackery Haddad MD        Subjective:     Principal Problem:Acute renal failure        HPI:  Nimisha Longoria is an 84 year old male with a previous medical history of anemia, CAD, HLD, HTN, sacral chordoma, right hip fracture and wheelchair bound, peripheral vascular diease, overactive bladder, and chronic foot wounds who presents to the emergency room for generalized weakness and decreased appetite for two days. Patient is wheelchair bound but normally able to transfer self and per chart review the wife of patient became concerned that she had to change his diaper in bed due to the generalized weakness. Per chart review patient was to see urology for overactive bladder symptoms and reports was put on medication Gemtessa approximately two weeks ago however patient is a poor historian and unable confirm this in chart review. Patient endorses decreased urine output since that time but reports feeling generally unwell for one month. Initial ED evaluation shows patient is in acute renal failure with hyperkalemia. Dr. Haro consulted and on case. Patient to be admitted by hospital medicine for further evaluation and management.     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 Vasquez on 2/9/24.     Patient is seen in the multidisciplinary rounds, patient is more  awake alert, no fever or chills, no nausea vomiting diarrhea, excellent urine output, no active bleeding, Simental catheter placed in the ER, has around 2.7 L urine output overnight.     Review of Systems   Constitutional:  Positive for fatigue. Negative for activity change and fever.   HENT:  Negative for ear discharge, facial swelling and sore throat.    Eyes:  Negative for photophobia, pain and visual disturbance.   Respiratory:  Negative for apnea, cough and shortness of breath.    Cardiovascular:  Negative for chest pain and leg swelling.   Gastrointestinal:  Negative for abdominal pain and blood in stool.   Endocrine: Negative for cold intolerance and heat intolerance.   Musculoskeletal:  Negative for back pain and gait problem.   Skin:  Negative for pallor and rash.   Neurological:  Negative for speech difficulty and headaches.   Psychiatric/Behavioral:  Negative for confusion, hallucinations and suicidal ideas.    All other systems reviewed and are negative.    Objective:     Vital Signs (Most Recent):  Temp: 97.7 °F (36.5 °C) (03/24/24 0300)  Pulse: 62 (03/24/24 0818)  Resp: 16 (03/24/24 0818)  BP: (!) 117/56 (03/24/24 0800)  SpO2: 100 % (03/24/24 0818) Vital Signs (24h Range):  Temp:  [97.4 °F (36.3 °C)-97.9 °F (36.6 °C)] 97.7 °F (36.5 °C)  Pulse:  [61-70] 62  Resp:  [11-24] 16  SpO2:  [96 %-100 %] 100 %  BP: (110-144)/(56-70) 117/56     Weight: 57.2 kg (126 lb 1.7 oz)  Body mass index is 19.17 kg/m².    Intake/Output Summary (Last 24 hours) at 3/24/2024 0842  Last data filed at 3/24/2024 0545  Gross per 24 hour   Intake 3641.64 ml   Output 3775 ml   Net -133.36 ml           Physical Exam  Vitals and nursing note reviewed.   Constitutional:       General: He is not in acute distress.     Appearance: He is not diaphoretic.   HENT:      Head: Normocephalic.   Eyes:      General: No scleral icterus.        Right eye: No discharge.         Left eye: No discharge.      Conjunctiva/sclera: Conjunctivae normal.    Neck:      Vascular: No JVD.   Cardiovascular:      Rate and Rhythm: Normal rate and regular rhythm.      Heart sounds: Normal heart sounds. No murmur heard.     No friction rub.   Pulmonary:      Effort: Pulmonary effort is normal. No respiratory distress.      Breath sounds: Normal breath sounds. No wheezing.   Abdominal:      General: Bowel sounds are normal. There is no distension.      Palpations: Abdomen is soft.      Tenderness: There is no abdominal tenderness.   Musculoskeletal:         General: Normal range of motion.   Lymphadenopathy:      Cervical: No cervical adenopathy.   Skin:     Findings: No erythema or rash.      Comments: Multiple skin ulcers in the bilateral heel   Neurological:      Mental Status: He is alert and oriented to person, place, and time.      Deep Tendon Reflexes: Reflexes are normal and symmetric.      Comments: Wheelchair-bound   Psychiatric:         Behavior: Behavior normal.             Significant Labs: All pertinent labs within the past 24 hours have been reviewed.  CBC:   Recent Labs   Lab 03/22/24  1305 03/23/24  0435 03/24/24  0437   WBC 6.07 5.73 4.77   HGB 8.1* 7.4* 7.2*   HCT 24.2* 21.3* 20.1*   * 106* 98*       CMP:   Recent Labs   Lab 03/22/24  1305 03/22/24  1829 03/22/24  2317 03/23/24  0435 03/24/24  0437      < > 141 142 144   K 6.8*   < > 5.7* 4.9 3.4*      < > 107 105 94*   CO2 8*   < > 11* 13* 28   GLU 77   < > 84 112* 138*   *   < > 208* 213* 180*   CREATININE 16.2*   < > 14.8* 14.3* 13.5*   CALCIUM 8.0*   < > 7.6* 7.5* 6.6*   PROT 7.2  --   --  6.3  --    ALBUMIN 3.6  --   --  3.2* 2.9*   BILITOT 0.2  --   --  0.3  --    ALKPHOS 71  --   --  65  --    AST 9*  --   --  10  --    ALT 12  --   --  12  --    ANIONGAP 25*   < > 23* 24* 22*    < > = values in this interval not displayed.       Microbiology Results (last 7 days)       Procedure Component Value Units Date/Time    Urine culture [[id number]]  (Abnormal) Collected: 03/22/24  1515    Order Status: Completed Specimen: Urine Updated: 03/24/24 0710     Urine Culture, Routine COAGULASE-NEGATIVE STAPHYLOCOCCUS SPECIES  > 100,000 cfu/ml  Susceptibility testing not routinely performed.      Narrative:      Specimen Source->Urine    Blood culture (site 1) [[id number]] Collected: 03/22/24 2317    Order Status: Completed Specimen: Blood from Peripheral, Hand, Left Updated: 03/23/24 1558     Blood Culture, Routine No Growth to date    Narrative:      Site # 1, aerobic and anaerobic    Blood culture (site 2) [[id number]] Collected: 03/22/24 2317    Order Status: Completed Specimen: Blood from Peripheral, Hand, Right Updated: 03/23/24 1558     Blood Culture, Routine No Growth to date    Narrative:      Site # 2, aerobic only          Significant Imaging: I have reviewed all pertinent imaging results/findings within the past 24 hours.    No results found.- pulls last radiology orders  CT renal stone study: Pending.     Assessment/Plan:      * Acute renal failure  Patient with acute renal failure likely due to acute urinary retention secondary to  medication Gemtessa  acute renal failure is currently improving. Baseline creatinine  1.21  - Labs reviewed- Renal function/electrolytes with Estimated Creatinine Clearance: 3.2 mL/min (A) (based on SCr of 13.5 mg/dL (H)). according to latest data. Monitor urine output and serial BMP and adjust therapy as needed. Avoid nephrotoxins and renally dose meds for GFR listed above.    Followed nephrologist  -Oh cath with strict intake and output and Q shift oh care  -Gemtessa discontinued  -Serial BMP's   - consult neurologist  - add on Flomax        UTI (urinary tract infection)  Urinalysis with >100WBC with recommendations from nephrology to treat UTI    -Urine culture pending  -Ceftriaxone 1 gram IV daily      Hyperkalemia  This patient has hyperkalemia which is uncontrolled. We will monitor for arrhythmias with EKG and continuous telemetry. We will treat  the hyperkalemia with Potassium Binders, IV insulin and dextrose, Nebulized albuterol sulfate, Furosemide, and Sodium Bicarbonate. The likely etiology of the hyperkalemia is  Acute renal failure .  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 Latest blood pressure and vitals reviewed-     Temp:  [97.4 °F (36.3 °C)-97.9 °F (36.6 °C)]   Pulse:  [61-70]   Resp:  [11-24]   BP: (110-144)/(56-70)   SpO2:  [96 %-100 %] .   Home meds for hypertension were reviewed and noted below.   Hypertension Medications               amLODIPine (NORVASC) 5 MG tablet Take 1 tablet (5 mg total) by mouth once daily.    metoprolol succinate (TOPROL-XL) 25 MG 24 hr tablet Take 1 tablet (25 mg total) by mouth once daily.            While in the hospital, will manage blood pressure as follows; Continue home antihypertensive regimen    Will utilize p.r.n. blood pressure medication only if patient's blood pressure greater than 180/110 and he develops symptoms such as worsening chest pain or shortness of breath.    Chordoma  Chronic condition that causes patient to be wheelchair bound    -PT/OT consulted      Anemia  Patient's anemia is currently controlled. Has not received any PRBCs to date.  Etiology likely d/t chronic disease due to multiple mechanisms  Current CBC reviewed-   Lab Results   Component Value Date    HGB 7.2 (L) 03/24/2024    HCT 20.1 (LL) 03/24/2024     Monitor serial CBC and transfuse if patient becomes hemodynamically unstable, symptomatic or H/H drops below 7/21.      VTE Risk Mitigation (From admission, onward)           Ordered     heparin (porcine) injection 5,000 Units  Every 8 hours         03/22/24 2221     IP VTE HIGH RISK PATIENT  Once         03/22/24 2221     Place sequential compression device  Until discontinued         03/22/24 2221                    Discharge Planning   DARYL:      Code Status: Full Code   Is the patient medically ready for discharge?:     Reason for patient still in hospital (select all that apply): {HMREASONPATIENTINHOSP:28931}  Discharge Plan A: Home Health                  Ruth Romo MD  Department of Hospital Medicine   [facility]/Surg

## 2024-03-24 NOTE — SUBJECTIVE & OBJECTIVE
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 Vasquez on 2/9/24.     Patient resting.  On intravenous sodium bicarbonate infusion.  Nephrology team and urology teams are closely following.  Patient continues to have significant urine output.  Currently afebrile.  No immediate plans for hemodialysis treatment.    Review of Systems   Constitutional:  Positive for fatigue. Negative for activity change and fever.   HENT:  Negative for ear discharge, facial swelling and sore throat.    Eyes:  Negative for photophobia, pain and visual disturbance.   Respiratory:  Negative for apnea, cough and shortness of breath.    Cardiovascular:  Negative for chest pain and leg swelling.   Gastrointestinal:  Negative for abdominal pain and blood in stool.   Endocrine: Negative for cold intolerance and heat intolerance.   Musculoskeletal:  Negative for back pain and gait problem.   Skin:  Negative for pallor and rash.   Neurological:  Negative for speech difficulty and headaches.   Psychiatric/Behavioral:  Negative for confusion, hallucinations and suicidal ideas.    All other systems reviewed and are negative.    Objective:     Vital Signs (Most Recent):  Temp: 97.7 °F (36.5 °C) (03/24/24 0300)  Pulse: 62 (03/24/24 0818)  Resp: 16 (03/24/24 0818)  BP: (!) 117/56 (03/24/24 0800)  SpO2: 100 % (03/24/24 0818) Vital Signs (24h Range):  Temp:  [97.4 °F (36.3 °C)-97.9 °F (36.6 °C)] 97.7 °F (36.5 °C)  Pulse:  [61-70] 62  Resp:  [11-24] 16  SpO2:  [96 %-100 %] 100 %  BP: (110-144)/(56-70) 117/56     Weight: 57.2 kg (126 lb 1.7 oz)  Body mass index is 19.17 kg/m².    Intake/Output Summary (Last 24  hours) at 3/24/2024 0842  Last data filed at 3/24/2024 0545  Gross per 24 hour   Intake 3641.64 ml   Output 3775 ml   Net -133.36 ml           Physical Exam  Vitals and nursing note reviewed.   Constitutional:       General: He is not in acute distress.     Appearance: He is not diaphoretic.   HENT:      Head: Normocephalic.   Eyes:      General: No scleral icterus.        Right eye: No discharge.         Left eye: No discharge.      Conjunctiva/sclera: Conjunctivae normal.   Neck:      Vascular: No JVD.   Cardiovascular:      Rate and Rhythm: Normal rate and regular rhythm.      Heart sounds: Normal heart sounds. No murmur heard.     No friction rub.   Pulmonary:      Effort: Pulmonary effort is normal. No respiratory distress.      Breath sounds: Normal breath sounds. No wheezing.   Abdominal:      General: Bowel sounds are normal. There is no distension.      Palpations: Abdomen is soft.      Tenderness: There is no abdominal tenderness.   Musculoskeletal:         General: Normal range of motion.   Lymphadenopathy:      Cervical: No cervical adenopathy.   Skin:     Findings: No erythema or rash.      Comments: Multiple skin ulcers in the bilateral heel   Neurological:      Mental Status: He is alert and oriented to person, place, and time.      Deep Tendon Reflexes: Reflexes are normal and symmetric.      Comments: Wheelchair-bound   Psychiatric:         Behavior: Behavior normal.             Significant Labs: All pertinent labs within the past 24 hours have been reviewed.  CBC:   Recent Labs   Lab 03/22/24  1305 03/23/24  0435 03/24/24  0437   WBC 6.07 5.73 4.77   HGB 8.1* 7.4* 7.2*   HCT 24.2* 21.3* 20.1*   * 106* 98*       CMP:   Recent Labs   Lab 03/22/24  1305 03/22/24  1829 03/22/24  2317 03/23/24  0435 03/24/24  0437      < > 141 142 144   K 6.8*   < > 5.7* 4.9 3.4*      < > 107 105 94*   CO2 8*   < > 11* 13* 28   GLU 77   < > 84 112* 138*   *   < > 208* 213* 180*   CREATININE  16.2*   < > 14.8* 14.3* 13.5*   CALCIUM 8.0*   < > 7.6* 7.5* 6.6*   PROT 7.2  --   --  6.3  --    ALBUMIN 3.6  --   --  3.2* 2.9*   BILITOT 0.2  --   --  0.3  --    ALKPHOS 71  --   --  65  --    AST 9*  --   --  10  --    ALT 12  --   --  12  --    ANIONGAP 25*   < > 23* 24* 22*    < > = values in this interval not displayed.       Microbiology Results (last 7 days)       Procedure Component Value Units Date/Time    Urine culture [[id number]]  (Abnormal) Collected: 03/22/24 1515    Order Status: Completed Specimen: Urine Updated: 03/24/24 0710     Urine Culture, Routine COAGULASE-NEGATIVE STAPHYLOCOCCUS SPECIES  > 100,000 cfu/ml  Susceptibility testing not routinely performed.      Narrative:      Specimen Source->Urine    Blood culture (site 1) [[id number]] Collected: 03/22/24 2317    Order Status: Completed Specimen: Blood from Peripheral, Hand, Left Updated: 03/23/24 1558     Blood Culture, Routine No Growth to date    Narrative:      Site # 1, aerobic and anaerobic    Blood culture (site 2) [[id number]] Collected: 03/22/24 2317    Order Status: Completed Specimen: Blood from Peripheral, Hand, Right Updated: 03/23/24 1558     Blood Culture, Routine No Growth to date    Narrative:      Site # 2, aerobic only          Significant Imaging: I have reviewed all pertinent imaging results/findings within the past 24 hours.    No results found.- pulls last radiology orders  CT renal stone study: Pending.

## 2024-03-24 NOTE — ASSESSMENT & PLAN NOTE
Patient's anemia is currently controlled. Has not received any PRBCs to date. Etiology likely d/t chronic disease due to multiple mechanisms  Current CBC reviewed-   Lab Results   Component Value Date    HGB 7.2 (L) 03/24/2024    HCT 20.1 (LL) 03/24/2024     Monitor serial CBC and transfuse if patient becomes hemodynamically unstable, symptomatic or H/H drops below 7/21.

## 2024-03-24 NOTE — ASSESSMENT & PLAN NOTE
This patient has hyperkalemia which is uncontrolled. We will monitor for arrhythmias with EKG and continuous telemetry. We will treat the hyperkalemia with Potassium Binders, IV insulin and dextrose, Nebulized albuterol sulfate, Furosemide, and Sodium Bicarbonate. The likely etiology of the hyperkalemia is  Acute renal failure .  The patients latest potassium has been reviewed and the results are listed below  Recent Labs   Lab 03/24/24  0437   K 3.4*         -Continue Bicarb drip at 100ml/hour   -BMP at 2330- if 5.5 or greater patient will receive 60mg of lasix IV once

## 2024-03-24 NOTE — EICU
Intervention Initiated From:  COR / IVONNEU    Alisha intervened regarding:  Rounding (Video assessment)    Nurse Notified:  No    Doctor Notified:  No    Comments: Asleep  HR 62, B/P 121/57,  RR 13, Sats 100%

## 2024-03-24 NOTE — HOSPITAL COURSE
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 Vasquez on 2/9/24. Simental catheter placed, excellent urine output, no indication for dialysis as pe nephrology.  Urology has been consulted.  A CT abdomen and pelvis with stone protocol performed.  Nephrology team continued patient on intravenous sodium bicarbonate infusion for metabolic acidosis.  Patient was treated with intravenous ceftriaxone therapy for urinary tract infection.  Microbiology results remained negative.

## 2024-03-24 NOTE — CONSULTS
85 yo male w dnr  Pt continues to make urine - 1400ml today  Cr 13.5  Very minimal improvement  Discussed ct findings of girma hydronephrosis  Though no definite obstruction noted  Presently the patient and family are refusing any dialysis or invasive procedures  Will cont to follow for now

## 2024-03-24 NOTE — ASSESSMENT & PLAN NOTE
Patient with acute renal failure likely due to acute urinary retention secondary to  medication Gemtessa  acute renal failure is currently improving. Baseline creatinine  1.21  - Labs reviewed- Renal function/electrolytes with Estimated Creatinine Clearance: 3.2 mL/min (A) (based on SCr of 13.5 mg/dL (H)). according to latest data. Monitor urine output and serial BMP and adjust therapy as needed. Avoid nephrotoxins and renally dose meds for GFR listed above.    Followed nephrologist  -Oh cath with strict intake and output and Q shift oh care  -Gemtessa discontinued  -Serial BMP's   - follow urology recommendations and CT abdomen and pelvis stone protocol results.  - add on Flomax

## 2024-03-24 NOTE — ASSESSMENT & PLAN NOTE
Chronic, controlled. Latest blood pressure and vitals reviewed-     Temp:  [97.4 °F (36.3 °C)-97.9 °F (36.6 °C)]   Pulse:  [61-70]   Resp:  [11-24]   BP: (110-144)/(56-70)   SpO2:  [96 %-100 %] .   Home meds for hypertension were reviewed and noted below.   Hypertension Medications               amLODIPine (NORVASC) 5 MG tablet Take 1 tablet (5 mg total) by mouth once daily.    metoprolol succinate (TOPROL-XL) 25 MG 24 hr tablet Take 1 tablet (25 mg total) by mouth once daily.            While in the hospital, will manage blood pressure as follows; Continue home antihypertensive regimen    Will utilize p.r.n. blood pressure medication only if patient's blood pressure greater than 180/110 and he develops symptoms such as worsening chest pain or shortness of breath.

## 2024-03-24 NOTE — CONSULTS
Nephrology Consult Note        Patient Name: Nimisha Longoria  MRN: [id number]    Patient Class: IP- Inpatient   Admission Date: 3/22/2024  Length of Stay: 2 days  Date of Service: 3/24/2024    Attending Physician: Chris Field MD  Primary Care Provider: Zackery Haddad MD    Reason for Consult: meghan/hyperkalemia/uremia/acidosis    SUBJECTIVE:     HPI: 84M with a past medical history of CAD, HTN, HLD,sacral chordoma presents for evaluation of multiple complaints. EMS reports the patient has had generalized weakness for approximately month. They also report that for the last 2 days the patient has had nausea and a decreased appetite but denies any associated headache, cough, congestion, fever/chills, chest pain, back pain, neck pain, shortness of breath, vomiting, abdominal pain, or diarrhea.     Notably, about a week ago was started on Gemtesa for overactive bladder. In ER had oh placed with immediate evacuation of 700cc cloudy urine. Notably hsi scr is 16, BUN > 200, K 6.8.    Per d/w with Dr. Awad we will treat hyperkalemia per protocol with bicarb drip, Lasix 80mg IV x1, Inuln + D50, Lokelma and repeat labs in 2-3h. If unable to lower K < 5, will need to consider dialysis. This plan was d/w patient and wife at bedside and they are both agreeable. Questions answered.    3/23  no vomiting, sob, or confusion.  His only complaint is left ear pain  3/24  c/o ear pain.  No sob.  Poor appetite.  Cognitively intact.  4525 cc uop    Past Medical History:   Diagnosis Date    Abnormal SPEP 1/17/2021    Anemia     Anemia due to multiple mechanisms 1/17/2021    Anemia, chronic disease 1/17/2021    Coronary artery disease     Hyperlipidemia     Hypertension     Normochromic normocytic anemia 1/17/2021    Sacral chordoma      Past Surgical History:   Procedure Laterality Date    BACK SURGERY      CORONARY ARTERY BYPASS GRAFT      HIP SURGERY  2023    INTRAMEDULLARY RODDING OF FEMUR Right 04/08/2023    Procedure: INSERTION,  INTRAMEDULLARY CHUCKY, FEMUR, synthes, hana table, 1st assist;  Surgeon: Tyler Rizvi MD;  Location: [facility] OR;  Service: Orthopedics;  Laterality: Right;    SPINE SURGERY      Dr Villatoro     No family history on file.  Social History     Tobacco Use    Smoking status: Former     Current packs/day: 2.00     Average packs/day: 2.0 packs/day for 25.0 years (50.0 ttl pk-yrs)     Types: Cigarettes    Smokeless tobacco: Never    Tobacco comments:     quit 1985   Substance Use Topics    Alcohol use: No    Drug use: No       Review of patient's allergies indicates:  No Known Allergies    Outpatient meds:  No current facility-administered medications on file prior to encounter.     Current Outpatient Medications on File Prior to Encounter   Medication Sig Dispense Refill    amLODIPine (NORVASC) 5 MG tablet Take 1 tablet (5 mg total) by mouth once daily. 90 tablet 3    aspirin (ECOTRIN) 81 MG EC tablet Take 2 tablets (162 mg total) by mouth once daily. Resume May 13, after  BID course finishes      GEMTESA 75 mg Tab Take 1 tablet by mouth once daily.      metoprolol succinate (TOPROL-XL) 25 MG 24 hr tablet Take 1 tablet (25 mg total) by mouth once daily. 90 tablet 1    senna-docusate 8.6-50 mg (PERICOLACE) 8.6-50 mg per tablet Take 1 tablet by mouth every 12 (twelve) hours as needed for Constipation. 60 tablet 0       Scheduled meds:   amLODIPine  5 mg Oral Daily    cefTRIAXone (Rocephin) IV (PEDS and ADULTS)  1 g Intravenous Q24H    ciprofloxacin-dexAMETHasone 0.3-0.1%  4 drop Left Ear BID    heparin (porcine)  5,000 Units Subcutaneous Q8H    metoprolol succinate  25 mg Oral Daily    mupirocin   Nasal BID    potassium bicarbonate  20 mEq Oral Once       Infusions:   sodium chloride 0.9%         PRN meds:      Review of Systems:  Constitutional:  Negative for chills, fever, malaise/fatigue and weight loss.   HENT:  Negative for hearing loss and nosebleeds.    Eyes:  Negative for blurred vision, double vision  and photophobia.   Respiratory:  Negative for cough, shortness of breath and wheezing.    Cardiovascular:  Negative for chest pain, palpitations and leg swelling.   Gastrointestinal:  Negative for abdominal pain, constipation, diarrhea, heartburn, nausea and vomiting.   Genitourinary:  Negative for dysuria, frequency and urgency.   Musculoskeletal:  Negative for falls, joint pain and myalgias.   Skin:  Negative for itching and rash.   Neurological:  Negative for dizziness, speech change, focal weakness, loss of consciousness and headaches.   Endo/Heme/Allergies:  Does not bruise/bleed easily.   Psychiatric/Behavioral:  Negative for depression and substance abuse. The patient is not nervous/anxious.      OBJECTIVE:     Vital Signs and IO:  Temp:  [97.4 °F (36.3 °C)-97.9 °F (36.6 °C)]   Pulse:  [61-70]   Resp:  [11-24]   BP: (110-144)/(56-70)   SpO2:  [96 %-100 %]   I/O last 3 completed shifts:  In: 3791.6 [P.O.:675; I.V.:3016.6; IV Piggyback:100]  Out: 7300 [Urine:7300]  Wt Readings from Last 5 Encounters:   03/22/24 57.2 kg (126 lb 1.7 oz)   08/14/23 71.2 kg (157 lb)   04/23/23 71.4 kg (157 lb 6 oz)   04/27/23 71.4 kg (157 lb 6.5 oz)   04/08/23 70.8 kg (156 lb 1.4 oz)     Body mass index is 19.17 kg/m².    Physical Exam  Constitutional:       General: Patient is not in acute distress.     Appearance: Patient is well-developed. She is not diaphoretic.   HENT:      Head: Normocephalic and atraumatic.      Mouth/Throat: Mucous membranes are moist.      Pain with pressure applied to tragus   Eyes:      General: No scleral icterus.     Pupils: Pupils are equal, round, and reactive to light.   Cardiovascular:      Rate and Rhythm: Normal rate and regular rhythm.   Pulmonary:      Effort: Pulmonary effort is normal. No respiratory distress.      Breath sounds: No stridor.   Abdominal:      General: There is no distension.      Palpations: Abdomen is soft.   Musculoskeletal:         General: No deformity. Normal range of  motion.      Cervical back: Neck supple.   Skin:     General: Skin is warm and dry.      Findings: No rash present. No erythema.   Neurological:      Mental Status: Patient is alert and oriented to person, place, and time.      Cranial Nerves: No cranial nerve deficit.   Psychiatric:         Behavior: Behavior normal.     Laboratory:  Recent Labs   Lab 03/22/24  2317 03/23/24 0435 03/24/24 0437    142 144   K 5.7* 4.9 3.4*    105 94*   CO2 11* 13* 28   * 213* 180*   CREATININE 14.8* 14.3* 13.5*   GLU 84 112* 138*         Recent Labs   Lab 03/22/24  1305 03/22/24  1829 03/22/24  2317 03/23/24 0435 03/24/24 0437   CALCIUM 8.0*   < > 7.6* 7.5* 6.6*   ALBUMIN 3.6  --   --  3.2* 2.9*   PHOS  --   --   --   --  8.5*   MG 2.4  --   --   --  1.6    < > = values in this interval not displayed.               Recent Labs   Lab 03/22/24  1606   POCTGLUCOSE 76               Recent Labs   Lab 03/22/24  1305 03/23/24  0435 03/24/24 0437   WBC 6.07 5.73 4.77   HGB 8.1* 7.4* 7.2*   HCT 24.2* 21.3* 20.1*   * 106* 98*   MCV 90 86 86   MCHC 33.5 34.7 35.8   MONO 3.3*  0.2* 6.1  0.4 5.7  0.3   EOSINOPHIL 0.5 1.2 1.7         Recent Labs   Lab 03/22/24  1305 03/23/24  0435 03/24/24 0437   BILITOT 0.2 0.3  --    PROT 7.2 6.3  --    ALBUMIN 3.6 3.2* 2.9*   ALKPHOS 71 65  --    ALT 12 12  --    AST 9* 10  --          Recent Labs   Lab 05/09/23  0850 03/22/24  1515   Color, UA Yellow Yellow   Appearance, UA Hazy A Hazy A   pH, UA 6.0 6.0   Specific [place], UA 1.020 1.010   Protein, UA 1+ A 1+ A   Glucose, UA Negative Negative   Ketones, UA Negative Trace A   Urobilinogen, UA Negative Negative   Bilirubin (UA) Negative Negative   Occult Blood UA 1+ A 1+ A   Nitrite, UA Negative Negative   RBC, UA 0 8 H   WBC, UA >100 H >100 H   Bacteria Moderate A Rare   Hyaline Casts, UA 2 A 0         Recent Labs   Lab 09/27/22  1238   Sample VENOUS         Microbiology Results (last 7 days)       Procedure Component Value  Units Date/Time    Urine culture [[id number]]  (Abnormal) Collected: 03/22/24 1515    Order Status: Completed Specimen: Urine Updated: 03/24/24 0710     Urine Culture, Routine COAGULASE-NEGATIVE STAPHYLOCOCCUS SPECIES  > 100,000 cfu/ml  Susceptibility testing not routinely performed.      Narrative:      Specimen Source->Urine    Blood culture (site 1) [[id number]] Collected: 03/22/24 2317    Order Status: Completed Specimen: Blood from Peripheral, Hand, Left Updated: 03/23/24 1558     Blood Culture, Routine No Growth to date    Narrative:      Site # 1, aerobic and anaerobic    Blood culture (site 2) [[id number]] Collected: 03/22/24 2317    Order Status: Completed Specimen: Blood from Peripheral, Hand, Right Updated: 03/23/24 1558     Blood Culture, Routine No Growth to date    Narrative:      Site # 2, aerobic only            ASSESSMENT/PLAN:     MAC due to urinary retention due to Gemtasa  Possible UTI  Hyperkalemia-->hypokalemia  Acidosis--resolved  Uremia  CKD stage 3  Anemia  HTN  Post obstructive polyuria  No NSAIDs, ACEI/ARB, IV contrast or other nephrotoxins.  Keep MAP > 60, SBP > 100.  Dose meds for GFR < 15 ml/min.  Renal diet - low K, low phos.     Switch to normal saline  Treat UTI, follow Cx.  Keep oh and monitor UO. Obviously hold Gemtesa.  If no improvement with K, will consider trialysis line placement and emergent HD.  Hgb and HCT are acceptable. Monitor for now.  BP seem controlled. Tolerate asymptomatic HTN up to -160.  Cut fluid rate due to rising bnp  Otic drops    Pt adamantly states he does not want to pursue dialysis.  I frankly told him that if his kidneys stop improving and he doesn't do dialysis he could die.  He expressed understanding.  I discussed code status.  He states he does not want intubation, chest compressions, of defibrillation.  I will change his code status to DNR per his wishes.       Thank you for allowing us to participate in the care of your patient!   We will  follow the patient and provide recommendations as needed.    Patient care time was spent personally by me on the following activities:     Obtaining a history.  Examination of patient.  Providing medical care at the patients bedside.  Developing a treatment plan with patient or surrogate and bedside caregivers.  Ordering and reviewing laboratory studies, radiographic studies, pulse oximetry.  Ordering and performing treatments and interventions.  Evaluation of patient's response to treatment.  Discussions with consultants while on the unit and immediately available to the patient.  Re-evaluation of the patient's condition.  Documentation in the medical record.     Jason Gordon MD    [facility]  [address] [place] [id number]    [phone] - tel  [phone] - fax    3/24/2024

## 2024-03-25 PROBLEM — Z71.89 ACP (ADVANCE CARE PLANNING): Status: ACTIVE | Noted: 2024-03-25

## 2024-03-25 LAB
ALBUMIN SERPL BCP-MCNC: 2.9 G/DL (ref 3.5–5.2)
ANION GAP SERPL CALC-SCNC: 20 MMOL/L (ref 8–16)
BASOPHILS # BLD AUTO: 0.02 K/UL (ref 0–0.2)
BASOPHILS NFR BLD: 0.4 % (ref 0–1.9)
BUN SERPL-MCNC: 156 MG/DL (ref 8–23)
CALCIUM SERPL-MCNC: 6.2 MG/DL (ref 8.7–10.5)
CHLORIDE SERPL-SCNC: 98 MMOL/L (ref 95–110)
CO2 SERPL-SCNC: 29 MMOL/L (ref 23–29)
CREAT SERPL-MCNC: 12.3 MG/DL (ref 0.5–1.4)
DIFFERENTIAL METHOD BLD: ABNORMAL
EOSINOPHIL # BLD AUTO: 0.1 K/UL (ref 0–0.5)
EOSINOPHIL NFR BLD: 1.1 % (ref 0–8)
ERYTHROCYTE [DISTWIDTH] IN BLOOD BY AUTOMATED COUNT: 15.1 % (ref 11.5–14.5)
EST. GFR  (NO RACE VARIABLE): 4 ML/MIN/1.73 M^2
GLUCOSE SERPL-MCNC: 97 MG/DL (ref 70–110)
HCT VFR BLD AUTO: 28.2 % (ref 40–54)
HGB BLD-MCNC: 9.6 G/DL (ref 14–18)
IMM GRANULOCYTES # BLD AUTO: 0.03 K/UL (ref 0–0.04)
IMM GRANULOCYTES NFR BLD AUTO: 0.5 % (ref 0–0.5)
LYMPHOCYTES # BLD AUTO: 0.6 K/UL (ref 1–4.8)
LYMPHOCYTES NFR BLD: 11.1 % (ref 18–48)
MAGNESIUM SERPL-MCNC: 1.4 MG/DL (ref 1.6–2.6)
MCH RBC QN AUTO: 29.3 PG (ref 27–31)
MCHC RBC AUTO-ENTMCNC: 34 G/DL (ref 32–36)
MCV RBC AUTO: 86 FL (ref 82–98)
MONOCYTES # BLD AUTO: 0.4 K/UL (ref 0.3–1)
MONOCYTES NFR BLD: 6.7 % (ref 4–15)
NEUTROPHILS # BLD AUTO: 4.6 K/UL (ref 1.8–7.7)
NEUTROPHILS NFR BLD: 80.2 % (ref 38–73)
NRBC BLD-RTO: 0 /100 WBC
OHS QRS DURATION: 96 MS
OHS QTC CALCULATION: 467 MS
PHOSPHATE SERPL-MCNC: 8.3 MG/DL (ref 2.7–4.5)
PLATELET # BLD AUTO: 77 K/UL (ref 150–450)
PMV BLD AUTO: 10.4 FL (ref 9.2–12.9)
POTASSIUM SERPL-SCNC: 3.2 MMOL/L (ref 3.5–5.1)
RBC # BLD AUTO: 3.28 M/UL (ref 4.6–6.2)
SODIUM SERPL-SCNC: 147 MMOL/L (ref 136–145)
WBC # BLD AUTO: 5.7 K/UL (ref 3.9–12.7)

## 2024-03-25 PROCEDURE — 36415 COLL VENOUS BLD VENIPUNCTURE: CPT | Performed by: INTERNAL MEDICINE

## 2024-03-25 PROCEDURE — 63600175 PHARM REV CODE 636 W HCPCS: Performed by: INTERNAL MEDICINE

## 2024-03-25 PROCEDURE — 25000003 PHARM REV CODE 250: Performed by: INTERNAL MEDICINE

## 2024-03-25 PROCEDURE — 83735 ASSAY OF MAGNESIUM: CPT | Performed by: INTERNAL MEDICINE

## 2024-03-25 PROCEDURE — 94761 N-INVAS EAR/PLS OXIMETRY MLT: CPT

## 2024-03-25 PROCEDURE — 99221 1ST HOSP IP/OBS SF/LOW 40: CPT | Mod: ,,, | Performed by: FAMILY MEDICINE

## 2024-03-25 PROCEDURE — 11000001 HC ACUTE MED/SURG PRIVATE ROOM

## 2024-03-25 PROCEDURE — 80069 RENAL FUNCTION PANEL: CPT | Performed by: INTERNAL MEDICINE

## 2024-03-25 PROCEDURE — 25000003 PHARM REV CODE 250

## 2024-03-25 PROCEDURE — 99900031 HC PATIENT EDUCATION (STAT)

## 2024-03-25 PROCEDURE — 85025 COMPLETE CBC W/AUTO DIFF WBC: CPT | Performed by: INTERNAL MEDICINE

## 2024-03-25 RX ORDER — MAGNESIUM SULFATE HEPTAHYDRATE 40 MG/ML
2 INJECTION, SOLUTION INTRAVENOUS ONCE
Status: COMPLETED | OUTPATIENT
Start: 2024-03-25 | End: 2024-03-25

## 2024-03-25 RX ORDER — CALCIUM ACETATE 667 MG/1
1334 CAPSULE ORAL
Qty: 180 CAPSULE | Refills: 11
Start: 2024-03-25 | End: 2025-03-25

## 2024-03-25 RX ORDER — FLUTICASONE PROPIONATE 50 MCG
1 SPRAY, SUSPENSION (ML) NASAL 2 TIMES DAILY
Refills: 0
Start: 2024-03-25

## 2024-03-25 RX ORDER — SODIUM CHLORIDE 450 MG/100ML
INJECTION, SOLUTION INTRAVENOUS CONTINUOUS
Status: DISCONTINUED | OUTPATIENT
Start: 2024-03-25 | End: 2024-03-26 | Stop reason: HOSPADM

## 2024-03-25 RX ORDER — DOXYCYCLINE 100 MG/1
100 CAPSULE ORAL DAILY
Qty: 3 CAPSULE | Refills: 0 | Status: SHIPPED | OUTPATIENT
Start: 2024-03-25

## 2024-03-25 RX ORDER — CIPROFLOXACIN AND DEXAMETHASONE 3; 1 MG/ML; MG/ML
4 SUSPENSION/ DROPS AURICULAR (OTIC) 2 TIMES DAILY
Start: 2024-03-25 | End: 2024-03-30

## 2024-03-25 RX ORDER — CALCIUM ACETATE 667 MG/1
1334 CAPSULE ORAL
Status: DISCONTINUED | OUTPATIENT
Start: 2024-03-25 | End: 2024-03-26 | Stop reason: HOSPADM

## 2024-03-25 RX ORDER — POTASSIUM CHLORIDE 20 MEQ/1
40 TABLET, EXTENDED RELEASE ORAL ONCE
Status: COMPLETED | OUTPATIENT
Start: 2024-03-25 | End: 2024-03-25

## 2024-03-25 RX ADMIN — HEPARIN SODIUM 5000 UNITS: 5000 INJECTION, SOLUTION INTRAVENOUS; SUBCUTANEOUS at 02:03

## 2024-03-25 RX ADMIN — AMLODIPINE BESYLATE 5 MG: 5 TABLET ORAL at 09:03

## 2024-03-25 RX ADMIN — CALCIUM ACETATE 1334 MG: 667 CAPSULE ORAL at 05:03

## 2024-03-25 RX ADMIN — SODIUM CHLORIDE: 4.5 INJECTION, SOLUTION INTRAVENOUS at 11:03

## 2024-03-25 RX ADMIN — MAGNESIUM SULFATE HEPTAHYDRATE 2 G: 40 INJECTION, SOLUTION INTRAVENOUS at 11:03

## 2024-03-25 RX ADMIN — METOPROLOL SUCCINATE 25 MG: 25 TABLET, EXTENDED RELEASE ORAL at 09:03

## 2024-03-25 RX ADMIN — CALCIUM ACETATE 1334 MG: 667 CAPSULE ORAL at 11:03

## 2024-03-25 RX ADMIN — FLUTICASONE PROPIONATE 50 MCG: 50 SPRAY, METERED NASAL at 09:03

## 2024-03-25 RX ADMIN — POTASSIUM CHLORIDE 40 MEQ: 1500 TABLET, EXTENDED RELEASE ORAL at 11:03

## 2024-03-25 RX ADMIN — CIPROFLOXACIN AND DEXAMETHASONE 4 DROP: 3; 1 SUSPENSION/ DROPS AURICULAR (OTIC) at 09:03

## 2024-03-25 NOTE — PT/OT/SLP PROGRESS
Occupational Therapy      Patient Name:  Nimisha Longoria   MRN:  [id number]    Patient not seen today secondary to nausea/vomiting. Will follow-up 3/26/2024.    3/25/2024

## 2024-03-25 NOTE — NURSING
"Patient removed his IV at this time. Patient states "I thought I would be gone by now". Patient refuses new IV and states he no longer wants to take any medication or have any interventions. He states he is "ready to go" and is "tired of living". Patient would like to speak with his wife when she arrives this morning before continuing with any medications or treatment. Acknowledged patient's wishes and will pass along to oncoming RN.   "

## 2024-03-25 NOTE — ASSESSMENT & PLAN NOTE
Advance Care Planning     Date: 03/25/2024    Living Will  During this visit, I engaged the patient  in the voluntary advance care planning process.  The patient and I reviewed the role for advance directives and their purpose in directing future healthcare if the patient's unable to speak for him.  During this interview, patient's wife was present at bedside who also is in agreement.  At this point in time, the patient does have full decision-making capacity.  We discussed different extreme health states that he could experience, and reviewed what kind of medical care he would want in those situations.  Patient confirms DNR code status and requesting home hospice arrangements.  Discussed with  at bedside.  The patient communicated that if he were comatose and had little chance of a meaningful recovery, he would not want machines/life-sustaining treatments used. I spent a total of 16 minutes engaging the patient in this advance care planning discussion.

## 2024-03-25 NOTE — PLAN OF CARE
Met with pt and wife at bedside to discuss dc plan. Pt is interested in going home with hospice. They do not have a company preference and are agreeable to and info visit with [facility]. David Mckeon         03/25/24 2211   Discharge Reassessment   Assessment Type Discharge Planning Reassessment   Did the patient's condition or plan change since previous assessment? Yes   Discharge Plan discussed with: Spouse/sig other;Patient   Communicated DARYL with patient/caregiver Yes   Discharge Plan A Hospice/home   Discharge Plan B Home with family   DME Needed Upon Discharge  none   Transition of Care Barriers None   Post-Acute Status   Post-Acute Authorization Hospice   Hospice Status Referrals Sent

## 2024-03-25 NOTE — CARE UPDATE
03/25/24 0800   Patient Assessment/Suction   Level of Consciousness (AVPU) alert   Respiratory Effort Unlabored   Expansion/Accessory Muscles/Retractions no use of accessory muscles   Rhythm/Pattern, Respiratory pattern regular;unlabored   Cough Frequency no cough   PRE-TX-O2   Device (Oxygen Therapy) room air   SpO2 100 %   Pulse Oximetry Type Continuous   $ Pulse Oximetry - Multiple Charge Pulse Oximetry - Multiple   Oximetry Probe Status Assessed   Pulse 71   Resp 14   /66   Positioning HOB elevated 45 degrees   Education   $ Education Bronchodilator;15 min

## 2024-03-25 NOTE — PROGRESS NOTES
[facility] Medicine  Progress Note    Patient Name: Nimisha Longoria  MRN: [id number]  Patient Class: IP- Inpatient   Admission Date: 3/22/2024  Length of Stay: 3 days  Attending Physician: Chris Field MD  Primary Care Provider: Zackery Haddad MD        Subjective:     Principal Problem:Acute renal failure        HPI:  Nimisha Longoria is an 84 year old male with a previous medical history of anemia, CAD, HLD, HTN, sacral chordoma, right hip fracture and wheelchair bound, peripheral vascular diease, overactive bladder, and chronic foot wounds who presents to the emergency room for generalized weakness and decreased appetite for two days. Patient is wheelchair bound but normally able to transfer self and per chart review the wife of patient became concerned that she had to change his diaper in bed due to the generalized weakness. Per chart review patient was to see urology for overactive bladder symptoms and reports was put on medication Gemtessa approximately two weeks ago however patient is a poor historian and unable confirm this in chart review. Patient endorses decreased urine output since that time but reports feeling generally unwell for one month. Initial ED evaluation shows patient is in acute renal failure with hyperkalemia. Dr. Haro consulted and on case. Patient to be admitted by hospital medicine for further evaluation and management.     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 Vasquez on 2/9/24. Simental catheter placed, excellent urine output, no indication for dialysis as pe nephrology.  Urology has been  consulted.  A CT abdomen and pelvis with stone protocol performed.  Nephrology team continued patient on intravenous sodium bicarbonate infusion for metabolic acidosis.  Patient was treated with intravenous ceftriaxone therapy for urinary tract infection.  Microbiology results remained negative.    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 Vasquez on 2/9/24.     Patient resting.  On intravenous sodium bicarbonate infusion.  Nephrology team and urology teams are closely following.  Patient continues to have significant urine output.  Currently afebrile.  No immediate plans for hemodialysis treatment.    Review of Systems   Constitutional:  Positive for fatigue. Negative for activity change and fever.   HENT:  Negative for ear discharge, facial swelling and sore throat.    Eyes:  Negative for photophobia, pain and visual disturbance.   Respiratory:  Negative for apnea, cough and shortness of breath.    Cardiovascular:  Negative for chest pain and leg swelling.   Gastrointestinal:  Negative for abdominal pain and blood in stool.   Endocrine: Negative for cold intolerance and heat intolerance.   Musculoskeletal:  Negative for back pain and gait problem.   Skin:  Negative for pallor and rash.   Neurological:  Negative for speech difficulty and headaches.   Psychiatric/Behavioral:  Negative for confusion, hallucinations and suicidal ideas.    All other systems reviewed and are negative.    Objective:     Vital Signs (Most Recent):  Temp: 98.2 °F (36.8 °C) (03/25/24 0300)  Pulse: 63 (03/25/24 0528)  Resp: 13 (03/25/24 0528)  BP: (!) 140/65 (03/25/24 0528)  SpO2: 100 %  (03/25/24 0528) Vital Signs (24h Range):  Temp:  [97.5 °F (36.4 °C)-98.2 °F (36.8 °C)] 98.2 °F (36.8 °C)  Pulse:  [62-67] 63  Resp:  [11-27] 13  SpO2:  [98 %-100 %] 100 %  BP: (110-140)/(54-69) 140/65     Weight: 57.2 kg (126 lb 1.7 oz)  Body mass index is 19.17 kg/m².    Intake/Output Summary (Last 24 hours) at 3/25/2024 0900  Last data filed at 3/25/2024 0518  Gross per 24 hour   Intake 1350.42 ml   Output 3875 ml   Net -2524.58 ml           Physical Exam  Vitals and nursing note reviewed.   Constitutional:       General: He is not in acute distress.     Appearance: He is not diaphoretic.   HENT:      Head: Normocephalic.   Eyes:      General: No scleral icterus.        Right eye: No discharge.         Left eye: No discharge.      Conjunctiva/sclera: Conjunctivae normal.   Neck:      Vascular: No JVD.   Cardiovascular:      Rate and Rhythm: Normal rate and regular rhythm.      Heart sounds: Normal heart sounds. No murmur heard.     No friction rub.   Pulmonary:      Effort: Pulmonary effort is normal. No respiratory distress.      Breath sounds: Normal breath sounds. No wheezing.   Abdominal:      General: Bowel sounds are normal. There is no distension.      Palpations: Abdomen is soft.      Tenderness: There is no abdominal tenderness.   Musculoskeletal:         General: Normal range of motion.   Lymphadenopathy:      Cervical: No cervical adenopathy.   Skin:     Findings: No erythema or rash.      Comments: Multiple skin ulcers in the bilateral heel   Neurological:      Mental Status: He is alert and oriented to person, place, and time.      Deep Tendon Reflexes: Reflexes are normal and symmetric.      Comments: Wheelchair-bound   Psychiatric:         Behavior: Behavior normal.             Significant Labs: All pertinent labs within the past 24 hours have been reviewed.  CBC:   Recent Labs   Lab 03/24/24  0437 03/25/24  0232   WBC 4.77 5.70   HGB 7.2* 9.6*   HCT 20.1* 28.2*   PLT 98* 77*       CMP:   Recent  Labs   Lab 03/24/24  0437 03/25/24  0232    147*   K 3.4* 3.2*   CL 94* 98   CO2 28 29   * 97   * 156*   CREATININE 13.5* 12.3*   CALCIUM 6.6* 6.2*   ALBUMIN 2.9* 2.9*   ANIONGAP 22* 20*       Microbiology Results (last 7 days)       Procedure Component Value Units Date/Time    Blood culture (site 1) [[id number]] Collected: 03/22/24 2317    Order Status: Completed Specimen: Blood from Peripheral, Hand, Left Updated: 03/24/24 1032     Blood Culture, Routine No Growth to date      No Growth to date    Narrative:      Site # 1, aerobic and anaerobic    Blood culture (site 2) [[id number]] Collected: 03/22/24 2317    Order Status: Completed Specimen: Blood from Peripheral, Hand, Right Updated: 03/24/24 1032     Blood Culture, Routine No Growth to date      No Growth to date    Narrative:      Site # 2, aerobic only    Urine culture [[id number]]  (Abnormal) Collected: 03/22/24 1515    Order Status: Completed Specimen: Urine Updated: 03/24/24 0710     Urine Culture, Routine COAGULASE-NEGATIVE STAPHYLOCOCCUS SPECIES  > 100,000 cfu/ml  Susceptibility testing not routinely performed.      Narrative:      Specimen Source->Urine          Significant Imaging: I have reviewed all pertinent imaging results/findings within the past 24 hours.    No results found.- pulls last radiology orders  CT renal stone study: Pending.     Assessment/Plan:      * Acute renal failure  Patient with acute renal failure likely due to acute urinary retention secondary to  medication Gemtessa  acute renal failure is currently improving. Baseline creatinine  1.21  - Labs reviewed- Renal function/electrolytes with Estimated Creatinine Clearance: 3.2 mL/min (A) (based on SCr of 13.5 mg/dL (H)). according to latest data. Monitor urine output and serial BMP and adjust therapy as needed. Avoid nephrotoxins and renally dose meds for GFR listed above.    Followed nephrologist  -Oh cath with strict intake and output and Q shift oh  care  -Gemtessa discontinued  -Serial BMP's   - follow urology recommendations and CT abdomen and pelvis stone protocol results.  - add on Flomax        UTI (urinary tract infection)  Urinalysis with >100WBC with recommendations from nephrology to treat UTI    -Urine culture pending  -Ceftriaxone 1 gram IV daily      Hyperkalemia  This patient has hyperkalemia which is uncontrolled. We will monitor for arrhythmias with EKG and continuous telemetry. We will treat the hyperkalemia with Potassium Binders, IV insulin and dextrose, Nebulized albuterol sulfate, Furosemide, and Sodium Bicarbonate. The likely etiology of the hyperkalemia is  Acute renal failure .  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 Latest blood pressure and vitals reviewed-     Temp:  [97.4 °F (36.3 °C)-97.9 °F (36.6 °C)]   Pulse:  [61-70]   Resp:  [11-24]   BP: (110-144)/(56-70)   SpO2:  [96 %-100 %] .   Home meds for hypertension were reviewed and noted below.   Hypertension Medications               amLODIPine (NORVASC) 5 MG tablet Take 1 tablet (5 mg total) by mouth once daily.    metoprolol succinate (TOPROL-XL) 25 MG 24 hr tablet Take 1 tablet (25 mg total) by mouth once daily.             While in the hospital, will manage blood pressure as follows; Continue home antihypertensive regimen    Will utilize p.r.n. blood pressure medication only if patient's blood pressure greater than 180/110 and he develops symptoms such as worsening chest pain or shortness of breath.    Chordoma  Chronic condition that causes patient to be wheelchair bound    -PT/OT consulted      Anemia  Patient's anemia is currently controlled. Has not received any PRBCs to date. Etiology likely d/t chronic disease due to multiple mechanisms  Current CBC reviewed-   Lab Results   Component Value Date    HGB 7.2 (L) 03/24/2024    HCT 20.1 (LL) 03/24/2024     Monitor serial CBC and transfuse if patient becomes hemodynamically unstable, symptomatic or H/H drops below 7/21.      VTE Risk Mitigation (From admission, onward)           Ordered     heparin (porcine) injection 5,000 Units  Every 8 hours         03/22/24 2221     IP VTE HIGH RISK PATIENT  Once         03/22/24 2221     Place sequential compression device  Until discontinued         03/22/24 2221                    Discharge Planning   DARYL: 3/29/2024     Code Status: DNR   Is the patient medically ready for discharge?:     Reason for patient still in hospital (select all that apply): {HMREASONPATIENTINHOSP:83141}  Discharge Plan A: Home Health                  Ruth Romo MD  Department of Hospital Medicine   [facility]/Surg

## 2024-03-25 NOTE — PT/OT/SLP PROGRESS
Physical Therapy      Patient Name:  Nimisha Longoria   MRN:  [id number]    Patient not seen today secondary to Nausea/vomiting.  RN Paul) notified.  Will follow-up 3/26.

## 2024-03-25 NOTE — PLAN OF CARE
Pt to discharge home tomorrow with [facility]       03/25/24 8492   Post-Acute Status   Post-Acute Authorization Hospice

## 2024-03-25 NOTE — PROGRESS NOTES
Nephrology Progress Note        Patient Name: Nimisha Longoria  MRN: [id number]    Patient Class: IP- Inpatient   Admission Date: 3/22/2024  Length of Stay: 3 days  Date of Service: 3/25/2024    Attending Physician: Chris Field MD  Primary Care Provider: Zackery Haddad MD    Reason for Consult: mac/hyperkalemia/uremia/acidosis    SUBJECTIVE:     HPI: 84M with a past medical history of CAD, HTN, HLD,sacral chordoma presents for evaluation of multiple complaints. EMS reports the patient has had generalized weakness for approximately month. They also report that for the last 2 days the patient has had nausea and a decreased appetite but denies any associated headache, cough, congestion, fever/chills, chest pain, back pain, neck pain, shortness of breath, vomiting, abdominal pain, or diarrhea.     Notably, about a week ago was started on Gemtesa for overactive bladder. In ER had oh placed with immediate evacuation of 700cc cloudy urine. Notably hsi scr is 16, BUN > 200, K 6.8.    Per d/w with Dr. Awad we will treat hyperkalemia per protocol with bicarb drip, Lasix 80mg IV x1, Inuln + D50, Lokelma and repeat labs in 2-3h. If unable to lower K < 5, will need to consider dialysis. This plan was d/w patient and wife at bedside and they are both agreeable. Questions answered.    3/23  no vomiting, sob, or confusion.  His only complaint is left ear pain  3/24  c/o ear pain.  No sob.  Poor appetite.  Cognitively intact.  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     Current Outpatient Medications on File Prior to Encounter   Medication Sig Dispense Refill    amLODIPine (NORVASC) 5 MG tablet Take 1 tablet (5 mg total) by mouth once daily. 90 tablet 3    aspirin (ECOTRIN) 81 MG EC tablet Take 2 tablets (162 mg total) by mouth once daily. Resume May 13, after  BID course finishes      GEMTESA 75 mg Tab Take 1 tablet by mouth once daily.      metoprolol succinate (TOPROL-XL) 25 MG 24 hr tablet Take 1 tablet (25 mg total) by mouth once daily. 90 tablet 1    senna-docusate 8.6-50 mg (PERICOLACE) 8.6-50 mg per tablet Take 1 tablet by mouth every 12 (twelve) hours as needed for Constipation. 60 tablet 0       Scheduled meds:   amLODIPine  5 mg Oral Daily    cefTRIAXone (Rocephin) IV (PEDS and ADULTS)  1 g Intravenous Q24H    ciprofloxacin-dexAMETHasone 0.3-0.1%  4 drop Left Ear BID    fluticasone propionate  1 spray Each Nostril BID    heparin (porcine)  5,000 Units Subcutaneous Q8H    metoprolol succinate  25 mg Oral Daily    mupirocin   Nasal BID       Infusions:   sodium chloride 0.9% Stopped (03/24/24 1000)       PRN meds:      Review of Systems:  Neg    OBJECTIVE:     Vital Signs and IO:  Temp:  [97.5 °F (36.4 °C)-98.2 °F (36.8 °C)]   Pulse:  [62-71]   Resp:  [11-27]   BP: (110-140)/(54-69)   SpO2:  [98 %-100 %]   I/O last 3 completed shifts:  In: 1400.4 [P.O.:600; I.V.:85.4; Blood:515; IV Piggyback:200]  Out: 5250 [Urine:5250]  Wt Readings from Last 5 Encounters:   03/22/24 57.2 kg (126 lb 1.7 oz)   08/14/23 71.2 kg (157 lb)   04/23/23 71.4 kg (157 lb 6 oz)   04/27/23 71.4 kg (157 lb 6.5 oz)   04/08/23 70.8 kg (156 lb 1.4 oz)     Body mass index is 19.17 kg/m².    Physical Exam  Constitutional:       General: Patient is not in acute distress.     Appearance:  Patient is well-developed. She is not diaphoretic.   HENT:      Head: Normocephalic and atraumatic.      Mouth/Throat: Mucous membranes are moist.      Pain with pressure applied to tragus   Eyes:      General: No scleral icterus.     Pupils: Pupils are equal, round, and reactive to light.   Cardiovascular:      Rate and Rhythm: Normal rate and regular rhythm.   Pulmonary:      Effort: Pulmonary effort is normal. No respiratory distress.      Breath sounds: No stridor.   Abdominal:      General: There is no distension.      Palpations: Abdomen is soft.   Musculoskeletal:         General: No deformity. Normal range of motion.      Cervical back: Neck supple.   Skin:     General: Skin is warm and dry.      Findings: No rash present. No erythema.   Neurological:      Mental Status: Patient is alert and oriented to person, place, and time.      Cranial Nerves: No cranial nerve deficit.   Psychiatric:         Behavior: Behavior normal.     Laboratory:  Recent Labs   Lab 03/23/24 0435 03/24/24 0437 03/25/24  0232    144 147*   K 4.9 3.4* 3.2*    94* 98   CO2 13* 28 29   * 180* 156*   CREATININE 14.3* 13.5* 12.3*   * 138* 97         Recent Labs   Lab 03/22/24  1305 03/22/24  1829 03/23/24  0435 03/24/24  0437 03/25/24  0232   CALCIUM 8.0*   < > 7.5* 6.6* 6.2*   ALBUMIN 3.6  --  3.2* 2.9* 2.9*   PHOS  --   --   --  8.5* 8.3*   MG 2.4  --   --  1.6 1.4*    < > = values in this interval not displayed.               Recent Labs   Lab 03/22/24  1606   POCTGLUCOSE 76               Recent Labs   Lab 03/23/24  0435 03/24/24  0437 03/25/24  0232   WBC 5.73 4.77 5.70   HGB 7.4* 7.2* 9.6*   HCT 21.3* 20.1* 28.2*   * 98* 77*   MCV 86 86 86   MCHC 34.7 35.8 34.0   MONO 6.1  0.4 5.7  0.3 6.7  0.4   EOSINOPHIL 1.2 1.7 1.1         Recent Labs   Lab 03/22/24  1305 03/23/24  0435 03/24/24  0437 03/25/24  0232   BILITOT 0.2 0.3  --   --    PROT 7.2 6.3  --   --    ALBUMIN 3.6 3.2* 2.9* 2.9*   ALKPHOS 71  65  --   --    ALT 12 12  --   --    AST 9* 10  --   --          Recent Labs   Lab 05/09/23  0850 03/22/24  1515   Color, UA Yellow Yellow   Appearance, UA Hazy A Hazy A   pH, UA 6.0 6.0   Specific Anton, UA 1.020 1.010   Protein, UA 1+ A 1+ A   Glucose, UA Negative Negative   Ketones, UA Negative Trace A   Urobilinogen, UA Negative Negative   Bilirubin (UA) Negative Negative   Occult Blood UA 1+ A 1+ A   Nitrite, UA Negative Negative   RBC, UA 0 8 H   WBC, UA >100 H >100 H   Bacteria Moderate A Rare   Hyaline Casts, UA 2 A 0         Recent Labs   Lab 09/27/22  1238   Sample VENOUS         Microbiology Results (last 7 days)       Procedure Component Value Units Date/Time    Blood culture (site 1) [[id number]] Collected: 03/22/24 2317    Order Status: Completed Specimen: Blood from Peripheral, Hand, Left Updated: 03/25/24 1032     Blood Culture, Routine No Growth to date      No Growth to date      No Growth to date    Narrative:      Site # 1, aerobic and anaerobic    Blood culture (site 2) [[id number]] Collected: 03/22/24 2317    Order Status: Completed Specimen: Blood from Peripheral, Hand, Right Updated: 03/25/24 1032     Blood Culture, Routine No Growth to date      No Growth to date      No Growth to date    Narrative:      Site # 2, aerobic only    Urine culture [[id number]]  (Abnormal) Collected: 03/22/24 1515    Order Status: Completed Specimen: Urine Updated: 03/24/24 0710     Urine Culture, Routine COAGULASE-NEGATIVE STAPHYLOCOCCUS SPECIES  > 100,000 cfu/ml  Susceptibility testing not routinely performed.      Narrative:      Specimen Source->Urine          From Dr. Gordon:  Pt adamantly states he does not want to pursue dialysis.  I frankly told him that if his kidneys stop improving and he doesn't do dialysis he could die.  He expressed understanding.  I discussed code status.  He states he does not want intubation, chest compressions, of defibrillation.  I will change his code status to DNR per his  wishes.       Thank you for allowing us to participate in the care of your patient!   We will follow the patient and provide recommendations as needed.    Patient care time was spent personally by me on the following activities: > 35 min    Obtaining a history.  Examination of patient.  Providing medical care at the patients bedside.  Developing a treatment plan with patient or surrogate and bedside caregivers.  Ordering and reviewing laboratory studies, radiographic studies, pulse oximetry.  Ordering and performing treatments and interventions.  Evaluation of patient's response to treatment.  Discussions with consultants while on the unit and immediately available to the patient.  Re-evaluation of the patient's condition.  Documentation in the medical record.     Meera Meredith MD    [facility]  [address] [place] [id number]    [phone] - tel  [phone] - fax    3/25/2024

## 2024-03-25 NOTE — SUBJECTIVE & OBJECTIVE
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 Vasquez on 2/9/24.     Patient and his wife requesting arrangements for home hospice.  Patient does not want to undergo any further testing or aggressive care.  Patient does not wish hemodialysis treatments.  Patient requested DNR code status.    Review of Systems   Constitutional:  Positive for fatigue. Negative for activity change and fever.   HENT:  Negative for ear discharge, facial swelling and sore throat.    Eyes:  Negative for photophobia, pain and visual disturbance.   Respiratory:  Negative for apnea, cough and shortness of breath.    Cardiovascular:  Negative for chest pain and leg swelling.   Gastrointestinal:  Negative for abdominal pain and blood in stool.   Endocrine: Negative for cold intolerance and heat intolerance.   Musculoskeletal:  Negative for back pain and gait problem.   Skin:  Negative for pallor and rash.   Neurological:  Negative for speech difficulty and headaches.   Psychiatric/Behavioral:  Negative for confusion, hallucinations and suicidal ideas.    All other systems reviewed and are negative.    Objective:     Vital Signs (Most Recent):  Temp: 98.2 °F (36.8 °C) (03/25/24 0300)  Pulse: 63 (03/25/24 0528)  Resp: 13 (03/25/24 0528)  BP: (!) 140/65 (03/25/24 0528)  SpO2: 100 % (03/25/24 0528) Vital Signs (24h Range):  Temp:  [97.5 °F (36.4 °C)-98.2 °F (36.8 °C)] 98.2 °F (36.8 °C)  Pulse:  [62-67] 63  Resp:  [11-27] 13  SpO2:  [98 %-100 %] 100 %  BP: (110-140)/(54-69) 140/65     Weight: 57.2 kg (126 lb 1.7 oz)  Body mass index is 19.17 kg/m².    Intake/Output Summary (Last 24 hours) at 3/25/2024  0900  Last data filed at 3/25/2024 0518  Gross per 24 hour   Intake 1350.42 ml   Output 3875 ml   Net -2524.58 ml           Physical Exam  Vitals and nursing note reviewed.   Constitutional:       General: He is not in acute distress.     Appearance: He is not diaphoretic.   HENT:      Head: Normocephalic.   Eyes:      General: No scleral icterus.        Right eye: No discharge.         Left eye: No discharge.      Conjunctiva/sclera: Conjunctivae normal.   Neck:      Vascular: No JVD.   Cardiovascular:      Rate and Rhythm: Normal rate and regular rhythm.      Heart sounds: Normal heart sounds. No murmur heard.     No friction rub.   Pulmonary:      Effort: Pulmonary effort is normal. No respiratory distress.      Breath sounds: Normal breath sounds. No wheezing.   Abdominal:      General: Bowel sounds are normal. There is no distension.      Palpations: Abdomen is soft.      Tenderness: There is no abdominal tenderness.   Musculoskeletal:         General: Normal range of motion.   Lymphadenopathy:      Cervical: No cervical adenopathy.   Skin:     Findings: No erythema or rash.      Comments: Multiple skin ulcers in the bilateral heel   Neurological:      Mental Status: He is alert and oriented to person, place, and time.      Deep Tendon Reflexes: Reflexes are normal and symmetric.      Comments: Wheelchair-bound   Psychiatric:         Behavior: Behavior normal.             Significant Labs: All pertinent labs within the past 24 hours have been reviewed.  CBC:   Recent Labs   Lab 03/24/24  0437 03/25/24  0232   WBC 4.77 5.70   HGB 7.2* 9.6*   HCT 20.1* 28.2*   PLT 98* 77*       CMP:   Recent Labs   Lab 03/24/24  0437 03/25/24  0232    147*   K 3.4* 3.2*   CL 94* 98   CO2 28 29   * 97   * 156*   CREATININE 13.5* 12.3*   CALCIUM 6.6* 6.2*   ALBUMIN 2.9* 2.9*   ANIONGAP 22* 20*       Microbiology Results (last 7 days)       Procedure Component Value Units Date/Time    Blood culture (site 1)  [[id number]] Collected: 03/22/24 2317    Order Status: Completed Specimen: Blood from Peripheral, Hand, Left Updated: 03/24/24 1032     Blood Culture, Routine No Growth to date      No Growth to date    Narrative:      Site # 1, aerobic and anaerobic    Blood culture (site 2) [[id number]] Collected: 03/22/24 2317    Order Status: Completed Specimen: Blood from Peripheral, Hand, Right Updated: 03/24/24 1032     Blood Culture, Routine No Growth to date      No Growth to date    Narrative:      Site # 2, aerobic only    Urine culture [[id number]]  (Abnormal) Collected: 03/22/24 1515    Order Status: Completed Specimen: Urine Updated: 03/24/24 0710     Urine Culture, Routine COAGULASE-NEGATIVE STAPHYLOCOCCUS SPECIES  > 100,000 cfu/ml  Susceptibility testing not routinely performed.      Narrative:      Specimen Source->Urine          Significant Imaging: I have reviewed all pertinent imaging results/findings within the past 24 hours.    No results found.- pulls last radiology orders    CT renal stone study:   New bilateral hydronephrosis. Thick walled bladder consistent with cystitis. A Simental catheter is in place. Prostate hypertrophy. 6 cm expansile mass of the sacrum consistent with a chordoma, unchanged. 6.3 cm cyst enlarging infrarenal abdominal aortic aneurysm. Cholelithiasis. Prior ORIF right hip. Multiple stable cyst of the liver parenchyma.

## 2024-03-25 NOTE — PROGRESS NOTES
[facility]  Progress Note    Patient Name: Nimisha Longoria  MRN: [id number]  Patient Class: IP- Inpatient   Admission Date: 3/22/2024  Length of Stay: 3 days  Attending Physician: Chris Field MD  Primary Care Provider: Zackery Haddad MD        Subjective:     Principal Problem:Acute renal failure        HPI:  Nimisha Longoria is an 84 year old male with a previous medical history of anemia, CAD, HLD, HTN, sacral chordoma, right hip fracture and wheelchair bound, peripheral vascular diease, overactive bladder, and chronic foot wounds who presents to the emergency room for generalized weakness and decreased appetite for two days. Patient is wheelchair bound but normally able to transfer self and per chart review the wife of patient became concerned that she had to change his diaper in bed due to the generalized weakness. Per chart review patient was to see urology for overactive bladder symptoms and reports was put on medication Gemtessa approximately two weeks ago however patient is a poor historian and unable confirm this in chart review. Patient endorses decreased urine output since that time but reports feeling generally unwell for one month. Initial ED evaluation shows patient is in acute renal failure with hyperkalemia. Dr. Haro consulted and on case. Patient to be admitted by hospital medicine for further evaluation and management.     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 Vasquez on 2/9/24. Simental catheter placed, excellent urine output, no indication for dialysis as pe nephrology.  Urology has been  consulted.  A CT abdomen and pelvis with stone protocol performed.  Nephrology team continued patient on intravenous sodium bicarbonate infusion for metabolic acidosis.  Patient was treated with intravenous ceftriaxone therapy for urinary tract infection.  Microbiology results remained negative.    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 Vasquez on 2/9/24.     Patient and his wife requesting arrangements for home hospice.  Patient does not want to undergo any further testing or aggressive care.  Patient does not wish hemodialysis treatments.  Patient requested DNR code status.    Review of Systems   Constitutional:  Positive for fatigue. Negative for activity change and fever.   HENT:  Negative for ear discharge, facial swelling and sore throat.    Eyes:  Negative for photophobia, pain and visual disturbance.   Respiratory:  Negative for apnea, cough and shortness of breath.    Cardiovascular:  Negative for chest pain and leg swelling.   Gastrointestinal:  Negative for abdominal pain and blood in stool.   Endocrine: Negative for cold intolerance and heat intolerance.   Musculoskeletal:  Negative for back pain and gait problem.   Skin:  Negative for pallor and rash.   Neurological:  Negative for speech difficulty and headaches.   Psychiatric/Behavioral:  Negative for confusion, hallucinations and suicidal ideas.    All other systems reviewed and are negative.    Objective:     Vital Signs (Most Recent):  Temp: 98.2 °F (36.8 °C) (03/25/24 0300)  Pulse: 63 (03/25/24 0528)  Resp: 13 (03/25/24 0528)  BP: (!) 140/65 (03/25/24 0528)  SpO2: 100 % (03/25/24 0528) Vital  Signs (24h Range):  Temp:  [97.5 °F (36.4 °C)-98.2 °F (36.8 °C)] 98.2 °F (36.8 °C)  Pulse:  [62-67] 63  Resp:  [11-27] 13  SpO2:  [98 %-100 %] 100 %  BP: (110-140)/(54-69) 140/65     Weight: 57.2 kg (126 lb 1.7 oz)  Body mass index is 19.17 kg/m².    Intake/Output Summary (Last 24 hours) at 3/25/2024 0900  Last data filed at 3/25/2024 0518  Gross per 24 hour   Intake 1350.42 ml   Output 3875 ml   Net -2524.58 ml           Physical Exam  Vitals and nursing note reviewed.   Constitutional:       General: He is not in acute distress.     Appearance: He is not diaphoretic.   HENT:      Head: Normocephalic.   Eyes:      General: No scleral icterus.        Right eye: No discharge.         Left eye: No discharge.      Conjunctiva/sclera: Conjunctivae normal.   Neck:      Vascular: No JVD.   Cardiovascular:      Rate and Rhythm: Normal rate and regular rhythm.      Heart sounds: Normal heart sounds. No murmur heard.     No friction rub.   Pulmonary:      Effort: Pulmonary effort is normal. No respiratory distress.      Breath sounds: Normal breath sounds. No wheezing.   Abdominal:      General: Bowel sounds are normal. There is no distension.      Palpations: Abdomen is soft.      Tenderness: There is no abdominal tenderness.   Musculoskeletal:         General: Normal range of motion.   Lymphadenopathy:      Cervical: No cervical adenopathy.   Skin:     Findings: No erythema or rash.      Comments: Multiple skin ulcers in the bilateral heel   Neurological:      Mental Status: He is alert and oriented to person, place, and time.      Deep Tendon Reflexes: Reflexes are normal and symmetric.      Comments: Wheelchair-bound   Psychiatric:         Behavior: Behavior normal.             Significant Labs: All pertinent labs within the past 24 hours have been reviewed.  CBC:   Recent Labs   Lab 03/24/24 0437 03/25/24  0232   WBC 4.77 5.70   HGB 7.2* 9.6*   HCT 20.1* 28.2*   PLT 98* 77*       CMP:   Recent Labs   Lab 03/24/24 0437  03/25/24  0232    147*   K 3.4* 3.2*   CL 94* 98   CO2 28 29   * 97   * 156*   CREATININE 13.5* 12.3*   CALCIUM 6.6* 6.2*   ALBUMIN 2.9* 2.9*   ANIONGAP 22* 20*       Microbiology Results (last 7 days)       Procedure Component Value Units Date/Time    Blood culture (site 1) [[id number]] Collected: 03/22/24 2317    Order Status: Completed Specimen: Blood from Peripheral, Hand, Left Updated: 03/24/24 1032     Blood Culture, Routine No Growth to date      No Growth to date    Narrative:      Site # 1, aerobic and anaerobic    Blood culture (site 2) [[id number]] Collected: 03/22/24 2317    Order Status: Completed Specimen: Blood from Peripheral, Hand, Right Updated: 03/24/24 1032     Blood Culture, Routine No Growth to date      No Growth to date    Narrative:      Site # 2, aerobic only    Urine culture [[id number]]  (Abnormal) Collected: 03/22/24 1515    Order Status: Completed Specimen: Urine Updated: 03/24/24 0710     Urine Culture, Routine COAGULASE-NEGATIVE STAPHYLOCOCCUS SPECIES  > 100,000 cfu/ml  Susceptibility testing not routinely performed.      Narrative:      Specimen Source->Urine          Significant Imaging: I have reviewed all pertinent imaging results/findings within the past 24 hours.    No results found.- pulls last radiology orders    CT renal stone study:   New bilateral hydronephrosis. Thick walled bladder consistent with cystitis. A Simental catheter is in place. Prostate hypertrophy. 6 cm expansile mass of the sacrum consistent with a chordoma, unchanged. 6.3 cm cyst enlarging infrarenal abdominal aortic aneurysm. Cholelithiasis. Prior ORIF right hip. Multiple stable cyst of the liver parenchyma.     Assessment/Plan:      * Acute renal failure  Patient with acute renal failure likely due to acute urinary retention secondary to  medication Gemtessa  acute renal failure is currently improving. Baseline creatinine  1.21  - Labs reviewed- Renal function/electrolytes with Estimated  Creatinine Clearance: 3.2 mL/min (A) (based on SCr of 13.5 mg/dL (H)). according to latest data. Monitor urine output and serial BMP and adjust therapy as needed. Avoid nephrotoxins and renally dose meds for GFR listed above.    Followed nephrologist  -Oh cath with strict intake and output and Q shift oh care  -Gemtessa discontinued  -Serial BMP's   - follow urology recommendations and CT abdomen and pelvis stone protocol results.  - add on Flomax        ACP (advance care planning)    Advance Care Planning    Date: 03/25/2024    Living Will  During this visit, I engaged the patient  in the voluntary advance care planning process.  The patient and I reviewed the role for advance directives and their purpose in directing future healthcare if the patient's unable to speak for him.  During this interview, patient's wife was present at bedside who also is in agreement.  At this point in time, the patient does have full decision-making capacity.  We discussed different extreme health states that he could experience, and reviewed what kind of medical care he would want in those situations.  Patient confirms DNR code status and requesting home hospice arrangements.  Discussed with  at bedside.  The patient communicated that if he were comatose and had little chance of a meaningful recovery, he would not want machines/life-sustaining treatments used. I spent a total of 16 minutes engaging the patient in this advance care planning discussion.               UTI (urinary tract infection)  Urinalysis with >100WBC with recommendations from nephrology to treat UTI    -Urine culture pending  -Ceftriaxone 1 gram IV daily      Hyperkalemia  This patient has hyperkalemia which is uncontrolled. We will monitor for arrhythmias with EKG and continuous telemetry. We will treat the hyperkalemia with Potassium Binders, IV insulin and dextrose, Nebulized albuterol sulfate, Furosemide, and Sodium Bicarbonate. The likely  etiology of the hyperkalemia is  Acute renal failure .  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 Latest blood pressure and vitals reviewed-     Temp:  [97.4 °F (36.3 °C)-97.9 °F (36.6 °C)]   Pulse:  [61-70]   Resp:  [11-24]   BP: (110-144)/(56-70)   SpO2:  [96 %-100 %] .   Home meds for hypertension were reviewed and noted below.   Hypertension Medications               amLODIPine (NORVASC) 5 MG tablet Take 1 tablet (5 mg total) by mouth once daily.    metoprolol succinate (TOPROL-XL) 25 MG 24 hr tablet Take 1 tablet (25 mg total) by mouth once daily.            While in the hospital, will manage blood pressure as follows; Continue home antihypertensive regimen    Will utilize p.r.n. blood pressure medication only if patient's blood pressure greater than 180/110 and he develops symptoms such as worsening chest pain or shortness of breath.    Chordoma  Chronic condition that causes patient to be wheelchair bound    -PT/OT consulted      Anemia  Patient's anemia is currently controlled. Has not received any PRBCs to date. Etiology likely d/t chronic disease due to multiple mechanisms  Current CBC reviewed-   Lab Results   Component Value Date    HGB 7.2 (L)  03/24/2024    HCT 20.1 (LL) 03/24/2024     Monitor serial CBC and transfuse if patient becomes hemodynamically unstable, symptomatic or H/H drops below 7/21.      VTE Risk Mitigation (From admission, onward)           Ordered     heparin (porcine) injection 5,000 Units  Every 8 hours         03/22/24 2221     IP VTE HIGH RISK PATIENT  Once         03/22/24 2221     Place sequential compression device  Until discontinued         03/22/24 2221                    Discharge Planning   DARYL: 3/25/2024     Code Status: DNR   Is the patient medically ready for discharge?:     Reason for patient still in hospital (select all that apply): Pending disposition  Discharge Plan A: Hospice/home                  Ruth Romo MD  Department of Hospital Medicine   [facility]/Surg

## 2024-03-25 NOTE — CONSULTS
"  [facility]/Surg  Adult Nutrition  Consult Note    SUMMARY     Recommendations  Continue Renal diet  Added Nepro all meals and Noe BID  Encourage intake during meal rounds  Monitor weight and labs  Collaborate with health care providers    Goal: improved intake > 50% upon Rd F/U  Nutrition Goal Status: new  Communication of RD Recs: reviewed with physician    Comments:  Pt has been administered lasix injections since admit which accounts for 25# wt loss 2/2 acute renal failure; he refuses dialysis.  Pt stated he is tired and " ready to go" Hospice has been consulted.  He reports poor appetite and intake but willing to drink the Nepro shakes. No reports of N/V or difficulty chewing/swallowing; last BM on 3/20.  He did receive blood transfusion on 3/24.    Nutrition Related Social Determinants of Health: SDOH: Adequate food in home environment    Assessment and Plan  Nutrition Problem  Inadequate oral intake    Related to (etiology):   Decreased appetite d/t acute renal failure    Signs and Symptoms (as evidenced by):   Intake < 25% of meals     Interventions/Recommendations (treatment strategy):  Provide commercial renal nutritional supplements an djuven to promote wound healing    Nutrition Diagnosis Status:   New    Malnutrition Assessment   Pt at high risk for Malnutrition.                    Reason for Assessment  Consulted for heel wounds; Pt admitted with Acute Renal Failure; UTI; bialteral hydronephrosis.  He is refusing dialysis and any aggressive care. Considering going home with Hospice.  Reason For Assessment: consult  Diagnosis: renal disease, other (see comments)  PMH:  wheelchair bound d/t sacral chordoma; CAD; HTN; Anemia; chronic bilateral heel pressure ulcers; PVD  Interdisciplinary Rounds: did not attend  Nutrition Discharge Planning: pending    Nutrition Risk Screen    Nutrition Risk Screen: large or nonhealing wound, burn or pressure injury, reduced oral intake over the last " "month    Nutrition/Diet History    Patient Reported Diet/Restrictions/Preferences: general  Spiritual, Cultural Beliefs, Restoration Practices, Values that Affect Care: no  Food Allergies: NKFA  Factors Affecting Nutritional Intake: decreased appetite    Anthropometrics  Wt Readings from Last 7 Encounters:   03/22/24 57.2 kg (126 lb 1.7 oz)   08/14/23 71.2 kg (157 lb)   04/23/23 71.4 kg (157 lb 6 oz)   04/27/23 71.4 kg (157 lb 6.5 oz)   04/08/23 70.8 kg (156 lb 1.4 oz)   05/05/22 79 kg (174 lb 3.2 oz)   04/05/22 74.4 kg (164 lb)     Temp: 97.5 °F (36.4 °C)  Height Method: Stated  Height: 5' 8" (172.7 cm)  Height (inches): 68 in  Weight Method: Bed Scale  Weight: 57.2 kg (126 lb 1.7 oz)  Weight (lb): 126.1 lb  Ideal Body Weight (IBW), Male: 154 lb  % Ideal Body Weight, Male (lb): 81.88 %  BMI (Calculated): 19.2  BMI Grade: 18.5-24.9 - normal     Lab/Procedures/Meds   Latest Reference Range & Units Most Recent   Hemoglobin 14.0 - 18.0 g/dL 9.6 (L)  3/25/24 02:32   Hematocrit 40.0 - 54.0 % 28.2 (L)  3/25/24 02:32   (L): Data is abnormally low   Latest Reference Range & Units Most Recent   Potassium 3.5 - 5.1 mmol/L 3.2 (L)  3/25/24 02:32   (L): Data is abnormally low   Latest Reference Range & Units Most Recent   Phosphorus Level 2.7 - 4.5 mg/dL 8.3 (H)  3/25/24 02:32   (H): Data is abnormally high  Pertinent Labs Reviewed: reviewed  Pertinent Medications Reviewed: reviewed; phosphate binder; IV rocephin; dexamethasone; heaparin; kcl; magnesium sulfate; NaCl infusing @ 45 ml/hr    Physical Findings/Assessment  Right Heel and sacral wound    Nabor score = 15    Estimated/Assessed Needs    Weight Used For Calorie Calculations: 57.2 kg (126 lb 1.7 oz)  Energy Calorie Requirements (kcal): 1800 calories daily;  30/kg  Energy Need Method: Kcal/kg  Protein Requirements: 75 gm protien daily;  1.3/kg; wound healing  Weight Used For Protein Calculations: 57.2 kg (126 lb 1.7 oz)     Estimated Fluid Requirement Method: RDA " Method  RDA Method (mL): 1800     Nutrition Prescription Ordered    Current Diet Order: Renal  Oral Nutrition Supplement: none    Evaluation of Received Nutrient/Fluid Intake    Intake/Output Summary (Last 24 hours) at 3/25/2024 1329  Last data filed at 3/25/2024 0518  Gross per 24 hour   Intake 1100.42 ml   Output 2475 ml   Net -1374.58 ml     Energy Calories Required: meeting needs  Protein Required: meeting needs  Fluid Required: meeting needs  Tolerance: tolerating  % Intake of Estimated Energy Needs: 0 - 25 %  % Meal Intake: 0 - 25 %    Nutrition Risk    Level of Risk/Frequency of Follow-up: high ; f/u 3/27    Monitor and Evaluation    Food and Nutrient Intake: food and beverage intake  Food and Nutrient Adminstration: diet order  Knowledge/Beliefs/Attitudes: beliefs and attitudes  Physical Activity and Function: nutrition-related ADLs and IADLs  Anthropometric Measurements: weight change  Biochemical Data, Medical Tests and Procedures: gastrointestinal profile, electrolyte and renal panel, glucose/endocrine profile, other (specify)  Nutrition-Focused Physical Findings: overall appearance, skin     Nutrition Follow-Up  yes

## 2024-03-25 NOTE — PLAN OF CARE
[facility]/Surg  Adult Nutrition  Consult Note     SUMMARY      Recommendations  Continue Renal diet  Added Nepro all meals and Noe BID  Encourage intake during meal rounds  Monitor weight and labs  Collaborate with health care providers     Goal: improved intake > 50% upon Rd F/U  Nutrition Goal Status: new  Communication of RD Recs: reviewed with physician      Assessment and Plan  Nutrition Problem  Inadequate oral intake     Related to (etiology):   Decreased appetite d/t acute renal failure     Signs and Symptoms (as evidenced by):   Intake < 25% of meals      Interventions/Recommendations (treatment strategy):  Provide commercial renal nutritional supplements an djuven to promote wound healing     Nutrition Diagnosis Status:   New     Malnutrition Assessment   Pt at high risk for Malnutrition.

## 2024-03-25 NOTE — CONSULTS
Chief complaint:  Generalized Weakness (Worsening over the past several days with decreased appetite)      HPI:  Nimisha Longoria is a 84 y.o. male presenting with pressure ulcers of the right lateral heel, left lateral foot and MAD of the scrotum, all POA. Pt is known to me, and has been admitted for kidney problems. Wounds were found to be POA. No other complaints today    PMH:  As per HPI and below:  Past Medical History:   Diagnosis Date    Abnormal SPEP 1/17/2021    Anemia     Anemia due to multiple mechanisms 1/17/2021    Anemia, chronic disease 1/17/2021    Coronary artery disease     Hyperlipidemia     Hypertension     Normochromic normocytic anemia 1/17/2021    Sacral chordoma        Social History     Socioeconomic History    Marital status:    Tobacco Use    Smoking status: Former     Current packs/day: 2.00     Average packs/day: 2.0 packs/day for 25.0 years (50.0 ttl pk-yrs)     Types: Cigarettes    Smokeless tobacco: Never    Tobacco comments:     quit 1985   Substance and Sexual Activity    Alcohol use: No    Drug use: No     Social Determinants of Health     Financial Resource Strain: Low Risk  (4/13/2023)    Overall Financial Resource Strain (CARDIA)     Difficulty of Paying Living Expenses: Not hard at all   Food Insecurity: No Food Insecurity (4/13/2023)    Hunger Vital Sign     Worried About Running Out of Food in the Last Year: Never true     Ran Out of Food in the Last Year: Never true   Transportation Needs: No Transportation Needs (4/13/2023)    PRAPARE - Transportation     Lack of Transportation (Medical): No     Lack of Transportation (Non-Medical): No   Stress: No Stress Concern Present (4/13/2023)    [employer] - Occupational Stress Questionnaire     Feeling of Stress : Not at all   Social Connections: Moderately Integrated (4/13/2023)    Social Connection and Isolation Panel [NHANES]     Frequency of Communication with Friends and Family: Twice a week      Frequency of Social Gatherings with Friends and Family: Twice a week     Attends [employer] Services: Never     Active Member of Clubs or Organizations: Yes     Attends Club or Organization Meetings: Never     Marital Status:    Housing Stability: Low Risk  (4/13/2023)    Housing Stability Vital Sign     Unable to Pay for Housing in the Last Year: No     Number of Places Lived in the Last Year: 1     Unstable Housing in the Last Year: No       Past Surgical History:   Procedure Laterality Date    BACK SURGERY      CORONARY ARTERY BYPASS GRAFT      HIP SURGERY  2023    INTRAMEDULLARY RODDING OF FEMUR Right 04/08/2023    Procedure: INSERTION, INTRAMEDULLARY CHUCKY, FEMUR, synthes, hana table, 1st assist;  Surgeon: Tyler Rizvi MD;  [facility];  Service: Orthopedics;  Laterality: Right;    SPINE SURGERY      Dr Villatoro       No family history on file.    Review of patient's allergies indicates:  No Known Allergies    No current facility-administered medications on file prior to encounter.     Current Outpatient Medications on File Prior to Encounter   Medication Sig Dispense Refill    amLODIPine (NORVASC) 5 MG tablet Take 1 tablet (5 mg total) by mouth once daily. 90 tablet 3    aspirin (ECOTRIN) 81 MG EC tablet Take 2 tablets (162 mg total) by mouth once daily. Resume May 13, after  BID course finishes      GEMTESA 75 mg Tab Take 1 tablet by mouth once daily.      metoprolol succinate (TOPROL-XL) 25 MG 24 hr tablet Take 1 tablet (25 mg total) by mouth once daily. 90 tablet 1    senna-docusate 8.6-50 mg (PERICOLACE) 8.6-50 mg per tablet Take 1 tablet by mouth every 12 (twelve) hours as needed for Constipation. 60 tablet 0       ROS: As per HPI and below:  Pertinent items are noted in HPI.      Physical Exam:     Vitals:    03/25/24 1000 03/25/24 1200 03/25/24 1355 03/25/24 1400   BP: 132/66 (!) 127/93  (!) 125/56   Pulse: 64 61  60   Resp: 12 16  18   Temp:   97.7 °F (36.5 °C)   "  TempSrc:   Axillary    SpO2: 99% 100%  (!) 92%   Weight:       Height:           BP  Min: 110/54  Max: 152/67  Temp  Av.8 °F (36.6 °C)  Min: 97.4 °F (36.3 °C)  Max: 98.2 °F (36.8 °C)  Pulse  Av.9  Min: 59  Max: 71  Resp  Av.1  Min: 11  Max: 29  SpO2  Av.5 %  Min: 92 %  Max: 100 %  Height  Av' 8" (172.7 cm)  Min: 5' 8" (172.7 cm)  Max: 5' 8" (172.7 cm)  Weight  Av.6 kg (138 lb 0.8 oz)  Min: 57.2 kg (126 lb 1.7 oz)  Max: 68 kg (150 lb)    Body mass index is 19.17 kg/m².          General:             Well developed, well nourished, no apparent distress  HEENT:              NCAT, no JVD, mucous membranes moist, EOM intact  Cardiovascular:  Regular rate and rhythm, normal S1, normal S2, No murmurs, rubs, or gallops  Respiratory:        Normal breath sounds, no wheezes, no rales, no rhonchi  Abdomen:           Bowel sounds present, non tender, non distended, no masses, no hepatojugular reflux  Extremities:        No clubbing, no cyanosis, no edema  Vascular:            2+ b/l radial.  Peripheral pulses intact.  No carotid bruits.  Neurological:      No focal deficits  Skin:                   No obvious rashes or erythema, left lateral foot pressure ulcer, right lateral heel pressure ulcer, MAD of the scrotum                Lab Results   Component Value Date    WBC 5.70 2024    HGB 9.6 (L) 2024    HCT 28.2 (L) 2024    MCV 86 2024    PLT 77 (L) 2024     Lab Results   Component Value Date    CHOL 162 10/31/2023    CHOL 204 (H) 2022    CHOL 149 2021     Lab Results   Component Value Date    HDL 52 10/31/2023    HDL 51 2022    HDL 47 2021     Lab Results   Component Value Date    LDLCALC 94 10/31/2023    LDLCALC 136 (H) 2022    LDLCALC 87 2021     Lab Results   Component Value Date    TRIG 72 10/31/2023    TRIG 75 2022    TRIG 65 2021     Lab Results   Component Value Date    CHOLHDL 3.1 10/31/2023    CHOLHDL 4.0 " 03/30/2022    CHOLHDL 3.2 09/21/2021     CMP  Recent Labs   Lab 03/25/24  0232   GLU 97   CALCIUM 6.2*   ALBUMIN 2.9*   *   K 3.2*   CO2 29   CL 98   *   CREATININE 12.3*      Lab Results   Component Value Date    TSH 3.41 03/30/2022           Assessment and Recommendations       Diagnoses:     Left lateral foot pressure ulcer stage 2  Right lateral heel stage 3 pressure ulcer  MAD of the scrotum    Plan:  1. Triad to the scrotum  2. AqAg to the left and right foot, cover with mepilex    Complexity:    moderate

## 2024-03-26 VITALS
HEIGHT: 68 IN | WEIGHT: 126.13 LBS | OXYGEN SATURATION: 99 % | DIASTOLIC BLOOD PRESSURE: 64 MMHG | BODY MASS INDEX: 19.12 KG/M2 | SYSTOLIC BLOOD PRESSURE: 121 MMHG | HEART RATE: 63 BPM | TEMPERATURE: 98 F | RESPIRATION RATE: 17 BRPM

## 2024-03-26 LAB
HBV CORE AB SERPL QL IA: NEGATIVE
HBV SURFACE AB SER QL IA: NEGATIVE
HBV SURFACE AB SERPL IA-ACNC: <5 MIU/ML
HBV SURFACE AG SERPL QL IA: NEGATIVE

## 2024-03-26 PROCEDURE — 25000003 PHARM REV CODE 250: Performed by: INTERNAL MEDICINE

## 2024-03-26 PROCEDURE — 94761 N-INVAS EAR/PLS OXIMETRY MLT: CPT

## 2024-03-26 PROCEDURE — 99900035 HC TECH TIME PER 15 MIN (STAT)

## 2024-03-26 RX ADMIN — CIPROFLOXACIN AND DEXAMETHASONE 4 DROP: 3; 1 SUSPENSION/ DROPS AURICULAR (OTIC) at 09:03

## 2024-03-26 RX ADMIN — FLUTICASONE PROPIONATE 50 MCG: 50 SPRAY, METERED NASAL at 09:03

## 2024-03-26 RX ADMIN — SODIUM CHLORIDE: 4.5 INJECTION, SOLUTION INTRAVENOUS at 03:03

## 2024-03-26 NOTE — NURSING
"Patient refusing all nighttime medication and vitals to be taken. Pt. states, "I just want to sleep. I am going home tomorrow." Charge RN notified and MIKO García notified.   "

## 2024-03-26 NOTE — PROGRESS NOTES
Nephrology Progress Note        Patient Name: Nimisha Longoria  MRN: [id number]    Patient Class: IP- Inpatient   Admission Date: 3/22/2024  Length of Stay: 4 days  Date of Service: 3/26/2024    Attending Physician: Ruth Romo MD  Primary Care Provider: Zackery Haddad MD    Reason for Consult: mac/hyperkalemia/uremia/acidosis    SUBJECTIVE:     HPI: 84M with a past medical history of CAD, HTN, HLD,sacral chordoma presents for evaluation of multiple complaints. EMS reports the patient has had generalized weakness for approximately month. They also report that for the last 2 days the patient has had nausea and a decreased appetite but denies any associated headache, cough, congestion, fever/chills, chest pain, back pain, neck pain, shortness of breath, vomiting, abdominal pain, or diarrhea.     Notably, about a week ago was started on Gemtesa for overactive bladder. In ER had oh placed with immediate evacuation of 700cc cloudy urine. Notably hsi scr is 16, BUN > 200, K 6.8.    Per d/w with Dr. Awad we will treat hyperkalemia per protocol with bicarb drip, Lasix 80mg IV x1, Inuln + D50, Lokelma and repeat labs in 2-3h. If unable to lower K < 5, will need to consider dialysis. This plan was d/w patient and wife at bedside and they are both agreeable. Questions answered.    3/23  no vomiting, sob, or confusion.  His only complaint is left ear pain  3/24  c/o ear pain.  No sob.  Poor appetite.  Cognitively intact.  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    Note reflects labs from yesterday...  - renal function improving- nonoliguric- patient is not interested in RRT- no nsaids or IV contrast  - dose meds for CrCl < 10  - change NS to 1/2 NS 75cc/hr  - ordered K, Mg repletion  - start phoslo  - Hb improved after blood transfusion 3/24  - continue norvasc    Outpatient meds:  No current facility-administered medications on file prior to encounter.     Current Outpatient Medications on File Prior to Encounter   Medication Sig Dispense Refill    amLODIPine (NORVASC) 5 MG tablet Take 1 tablet (5 mg total) by mouth once daily. 90 tablet 3    aspirin (ECOTRIN) 81 MG EC tablet Take 2 tablets (162 mg total) by mouth once daily. Resume May 13, after  BID course finishes      GEMTESA 75 mg Tab Take 1 tablet by mouth once daily.      metoprolol succinate (TOPROL-XL) 25 MG 24 hr tablet Take 1 tablet (25 mg total) by mouth once daily. 90 tablet 1    senna-docusate 8.6-50 mg (PERICOLACE) 8.6-50 mg per tablet Take 1 tablet by mouth every 12 (twelve) hours as needed for Constipation. 60 tablet 0       Scheduled meds:   amLODIPine  5 mg Oral Daily    calcium acetate(phosphat bind)  1,334 mg Oral TID WM    cefTRIAXone (Rocephin) IV (PEDS and ADULTS)  1 g Intravenous Q24H    ciprofloxacin-dexAMETHasone 0.3-0.1%  4 drop Left Ear BID    fluticasone propionate  1 spray Each Nostril BID    heparin (porcine)  5,000 Units Subcutaneous Q8H    metoprolol succinate  25 mg Oral Daily    mupirocin   Nasal BID       Infusions:   sodium chloride 0.45% 75 mL/hr at 03/26/24 0322       PRN meds:      Review of Systems:  Neg    OBJECTIVE:     Vital Signs and IO:  Temp:  [97 °F (36.1 °C)-98.4 °F (36.9 °C)]   Pulse:  [60-67]   Resp:  [12-20]   BP: (125-141)/(56-93)   SpO2:  [92 %-100 %]   I/O last 3 completed shifts:  In: 775.4 [P.O.:440; I.V.:135.4; IV Piggyback:200]  Out: 4825 [Urine:4825]  Wt Readings from Last 5 Encounters:   03/22/24 57.2 kg (126 lb 1.7 oz)   08/14/23 71.2 kg (157 lb)    04/23/23 71.4 kg (157 lb 6 oz)   04/27/23 71.4 kg (157 lb 6.5 oz)   04/08/23 70.8 kg (156 lb 1.4 oz)     Body mass index is 19.17 kg/m².    Physical Exam  Constitutional:       General: Patient is not in acute distress.     Appearance: Patient is well-developed. She is not diaphoretic.   HENT:      Head: Normocephalic and atraumatic.      Mouth/Throat: Mucous membranes are moist.      Pain with pressure applied to tragus   Eyes:      General: No scleral icterus.     Pupils: Pupils are equal, round, and reactive to light.   Cardiovascular:      Rate and Rhythm: Normal rate and regular rhythm.   Pulmonary:      Effort: Pulmonary effort is normal. No respiratory distress.      Breath sounds: No stridor.   Abdominal:      General: There is no distension.      Palpations: Abdomen is soft.   Musculoskeletal:         General: No deformity. Normal range of motion.      Cervical back: Neck supple.   Skin:     General: Skin is warm and dry.      Findings: No rash present. No erythema.   Neurological:      Mental Status: Patient is alert and oriented to person, place, and time.      Cranial Nerves: No cranial nerve deficit.   Psychiatric:         Behavior: Behavior normal.     Laboratory:  Recent Labs   Lab 03/23/24 0435 03/24/24 0437 03/25/24  0232    144 147*   K 4.9 3.4* 3.2*    94* 98   CO2 13* 28 29   * 180* 156*   CREATININE 14.3* 13.5* 12.3*   * 138* 97         Recent Labs   Lab 03/22/24  1305 03/22/24  1829 03/23/24  0435 03/24/24  0437 03/25/24  0232   CALCIUM 8.0*   < > 7.5* 6.6* 6.2*   ALBUMIN 3.6  --  3.2* 2.9* 2.9*   PHOS  --   --   --  8.5* 8.3*   MG 2.4  --   --  1.6 1.4*    < > = values in this interval not displayed.               Recent Labs   Lab 03/22/24  1606   POCTGLUCOSE 76               Recent Labs   Lab 03/23/24  0435 03/24/24  0437 03/25/24  0232   WBC 5.73 4.77 5.70   HGB 7.4* 7.2* 9.6*   HCT 21.3* 20.1* 28.2*   * 98* 77*   MCV 86 86 86   MCHC 34.7 35.8 34.0    MONO 6.1  0.4 5.7  0.3 6.7  0.4   EOSINOPHIL 1.2 1.7 1.1         Recent Labs   Lab 03/22/24  1305 03/23/24  0435 03/24/24  0437 03/25/24  0232   BILITOT 0.2 0.3  --   --    PROT 7.2 6.3  --   --    ALBUMIN 3.6 3.2* 2.9* 2.9*   ALKPHOS 71 65  --   --    ALT 12 12  --   --    AST 9* 10  --   --          Recent Labs   Lab 05/09/23  0850 03/22/24  1515   Color, UA Yellow Yellow   Appearance, UA Hazy A Hazy A   pH, UA 6.0 6.0   Specific [place], UA 1.020 1.010   Protein, UA 1+ A 1+ A   Glucose, UA Negative Negative   Ketones, UA Negative Trace A   Urobilinogen, UA Negative Negative   Bilirubin (UA) Negative Negative   Occult Blood UA 1+ A 1+ A   Nitrite, UA Negative Negative   RBC, UA 0 8 H   WBC, UA >100 H >100 H   Bacteria Moderate A Rare   Hyaline Casts, UA 2 A 0         Recent Labs   Lab 09/27/22  1238   Sample VENOUS         Microbiology Results (last 7 days)       Procedure Component Value Units Date/Time    Blood culture (site 1) [[id number]] Collected: 03/22/24 2317    Order Status: Completed Specimen: Blood from Peripheral, Hand, Left Updated: 03/25/24 1032     Blood Culture, Routine No Growth to date      No Growth to date      No Growth to date    Narrative:      Site # 1, aerobic and anaerobic    Blood culture (site 2) [[id number]] Collected: 03/22/24 2317    Order Status: Completed Specimen: Blood from Peripheral, Hand, Right Updated: 03/25/24 1032     Blood Culture, Routine No Growth to date      No Growth to date      No Growth to date    Narrative:      Site # 2, aerobic only    Urine culture [[id number]]  (Abnormal) Collected: 03/22/24 1515    Order Status: Completed Specimen: Urine Updated: 03/24/24 0710     Urine Culture, Routine COAGULASE-NEGATIVE STAPHYLOCOCCUS SPECIES  > 100,000 cfu/ml  Susceptibility testing not routinely performed.      Narrative:      Specimen Source->Urine          From Dr. Gordon:  Pt adamantly states he does not want to pursue dialysis.  I frankly told him that if his  kidneys stop improving and he doesn't do dialysis he could die.  He expressed understanding.  I discussed code status.  He states he does not want intubation, chest compressions, of defibrillation.  I will change his code status to DNR per his wishes.       Thank you for allowing us to participate in the care of your patient!   We will follow the patient and provide recommendations as needed.    Patient care time was spent personally by me on the following activities: > 35 min    Obtaining a history.  Examination of patient.  Providing medical care at the patients bedside.  Developing a treatment plan with patient or surrogate and bedside caregivers.  Ordering and reviewing laboratory studies, radiographic studies, pulse oximetry.  Ordering and performing treatments and interventions.  Evaluation of patient's response to treatment.  Discussions with consultants while on the unit and immediately available to the patient.  Re-evaluation of the patient's condition.  Documentation in the medical record.     Meera Meredith MD    [facility]  [address] [place] [address]  [place] [address]    [phone] - tel  [phone] - fax    3/26/2024

## 2024-03-26 NOTE — CARE UPDATE
03/25/24 2011   Patient Assessment/Suction   Level of Consciousness (AVPU) alert   Respiratory Effort Unlabored   Expansion/Accessory Muscles/Retractions no retractions;no use of accessory muscles   PRE-TX-O2   Device (Oxygen Therapy) room air   SpO2 97 %   Pulse Oximetry Type Intermittent   $ Pulse Oximetry - Multiple Charge Pulse Oximetry - Multiple   Pulse 63   Resp 18

## 2024-03-26 NOTE — PT/OT/SLP PROGRESS
Occupational Therapy      Patient Name:  Nimisha Longoria   MRN:  [id number]    Patient not seen today secondary to Other (Comment) (Hold per Nurse Jodi. Pt being discharged home with Hospice and is refusing everything.).    3/26/2024

## 2024-03-26 NOTE — CARE UPDATE
03/26/24 0750   Patient Assessment/Suction   Level of Consciousness (AVPU) alert   Respiratory Effort Normal   PRE-TX-O2   Device (Oxygen Therapy) room air   SpO2 98 %   Pulse Oximetry Type Intermittent   $ Pulse Oximetry - Multiple Charge Pulse Oximetry - Multiple

## 2024-03-26 NOTE — PLAN OF CARE
Problem: Infection  Goal: Absence of Infection Signs and Symptoms  Outcome: Ongoing, Progressing     Problem: Adult Inpatient Plan of Care  Goal: Plan of Care Review  Outcome: Ongoing, Progressing     Problem: Adult Inpatient Plan of Care  Goal: Patient-Specific Goal (Individualized)  Outcome: Ongoing, Progressing     Problem: Adult Inpatient Plan of Care  Goal: Absence of Hospital-Acquired Illness or Injury  Outcome: Ongoing, Progressing     Problem: Adult Inpatient Plan of Care  Goal: Optimal Comfort and Wellbeing  Outcome: Ongoing, Progressing     Problem: Adult Inpatient Plan of Care  Goal: Readiness for Transition of Care  Outcome: Ongoing, Progressing     Problem: Skin Injury Risk Increased  Goal: Skin Health and Integrity  Outcome: Ongoing, Progressing     Problem: Renal Function Impairment (Acute Kidney Injury/Impairment)  Goal: Effective Renal Function  Outcome: Ongoing, Progressing     Problem: Fall Injury Risk  Goal: Absence of Fall and Fall-Related Injury  Outcome: Ongoing, Progressing

## 2024-03-26 NOTE — NURSING
Marie here for transportation to take pt home. Pts IV had been removed with cath intact. No bleeding from site. Simental intact and draining pink urine in drainage bag. Pt dcd via strectcher by [facility] ambulance and going home.

## 2024-03-26 NOTE — CONSULTS
Wound care consulted for wounds present on admit. Rounded with Dr. Ashley at the bedside. Left lateral heel wound noted with beefy red wound bed. Left lateral foot with small eschar noted. Left heel callous noted. Right lateral foot wounds x2 small areas healing. Orders as per Dr Ashley for AAA with foam dressings twice a week. Wound care completed as ordered.      Right lateral heel    Right lateral foot    Buttocks with redness noted-continue foam mepilex dressing for protection    Left lateral foot

## 2024-03-26 NOTE — DISCHARGE SUMMARY
[facility]  Discharge Summary      Patient Name: Nimisha Longoria  MRN: [id number]  JENIFFER: [id number]  Patient Class: IP- Inpatient  Admission Date: 3/22/2024  Hospital Length of Stay: 4 days  Discharge Date and Time:  03/26/2024 12:23 PM  Attending Physician: Ruth Romo MD   Discharging Provider: Ruth Romo MD  Primary Care Provider: Zackery Haddad MD    Primary Care Team: Networked reference to record PCT     HPI:   Nimisha Longoria is an 84 year old male with a previous medical history of anemia, CAD, HLD, HTN, sacral chordoma, right hip fracture and wheelchair bound, peripheral vascular diease, overactive bladder, and chronic foot wounds who presents to the emergency room for generalized weakness and decreased appetite for two days. Patient is wheelchair bound but normally able to transfer self and per chart review the wife of patient became concerned that she had to change his diaper in bed due to the generalized weakness. Per chart review patient was to see urology for overactive bladder symptoms and reports was put on medication Gemtessa approximately two weeks ago however patient is a poor historian and unable confirm this in chart review. Patient endorses decreased urine output since that time but reports feeling generally unwell for one month. Initial ED evaluation shows patient is in acute renal failure with hyperkalemia. Dr. Haro consulted and on case. Patient to be admitted by hospital medicine for further evaluation and management.     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 Simental catheter placed, excellent urine output, no indication for dialysis as pe nephrology.  Urology has been consulted.  A CT abdomen and pelvis with stone protocol performed.  Nephrology team continued patient on intravenous sodium bicarbonate infusion for metabolic acidosis.  Patient was treated with intravenous ceftriaxone therapy for urinary tract infection.  Microbiology results remained negative.  While patient has been receiving medical care in the hospital, patient after consultation with his wife has requested to go home with hospice care and does not wish anymore aggressive care.  Patient and his wife understand the nature of medical care given to the patient by hospice company.  Informational hospice visit was arranged by .  Patient opted to go home with [facility].  Please see discharge orders for details.    Goals of Care Treatment Preferences:  Code Status: DNR      Consults:   Consults (From admission, onward)          Status Ordering Provider     Inpatient consult to   Once        Provider:  (Not yet assigned)    Acknowledged MAREK ARMENTA     Inpatient consult to Urology  Once        Provider:  Adam Vasquez MD    Acknowledged SULTAN, MAREK     Inpatient consult to Registered Dietitian/Nutritionist  Once        Provider:  (Not yet assigned)    Completed SANTOS CURRY     Inpatient consult to Nephrology  Once        Provider:  Dre Haro MD    Acknowledged SULTAN, AQIB          Microbiology Results (last 7 days)       Procedure Component Value Units Date/Time    Blood culture (site 1) [[id number]] Collected: 03/22/24 2317    Order Status: Completed Specimen: Blood from Peripheral, Hand, Left Updated: 03/25/24 1032     Blood Culture, Routine No Growth to date      No Growth to date      No Growth to date    Narrative:      Site # 1, aerobic and anaerobic    Blood culture (site 2) [[id number]] Collected: 03/22/24 2317    Order  Status: Completed Specimen: Blood from Peripheral, Hand, Right Updated: 03/25/24 1032     Blood Culture, Routine No Growth to date      No Growth to date      No Growth to date    Narrative:      Site # 2, aerobic only    Urine culture [[id number]]  (Abnormal) Collected: 03/22/24 1515    Order Status: Completed Specimen: Urine Updated: 03/24/24 0710     Urine Culture, Routine COAGULASE-NEGATIVE STAPHYLOCOCCUS SPECIES  > 100,000 cfu/ml  Susceptibility testing not routinely performed.      Narrative:      Specimen Source->Urine        CT renal stone study:   New bilateral hydronephrosis. Thick walled bladder consistent with cystitis. A Simental catheter is in place. Prostate hypertrophy. 6 cm expansile mass of the sacrum consistent with a chordoma, unchanged. 6.3 cm cyst enlarging infrarenal abdominal aortic aneurysm. Cholelithiasis. Prior ORIF right hip. Multiple stable cyst of the liver parenchyma.   Final Active Diagnoses:    Diagnosis Date Noted POA    PRINCIPAL PROBLEM:  Acute renal failure [N17.9] 03/22/2024 Yes    ACP (advance care planning) [Z71.89] 03/25/2024 Not Applicable    Hyperkalemia [E87.5] 03/22/2024 Yes    UTI (urinary tract infection) [N39.0] 03/22/2024 Yes    Pressure injury of dorsum of right foot, stage 2 [L89.892] 06/05/2023 Yes    Pressure injury of right heel, unstageable [L89.610] 05/22/2023 Yes    Pressure injury of left foot, unstageable [L89.890] 05/22/2023 Yes    Essential hypertension [I10] 10/03/2019 Yes    Chordoma [C41.2] 08/25/2015 Yes    Anemia [D64.9] 01/14/2015 Yes      Problems Resolved During this Admission:       Discharged Condition: fair    Disposition: Hospice/Home    Follow Up:   Follow-up Information       Zackery Haddad MD Follow up.    Specialties: Family Medicine, Home Health Services, Hospice Services  Why: As needed  Contact information:  [address] [facility]  [address] [place] [id number] [phone]                           Patient Instructions:      Diet Cardiac      Diet renal   Order Comments: Nepro can with meals     Notify your health care provider if you experience any of the following:   Order Comments: Please call hospice nurse with any changes in medical condition.     Change dressing (specify)   Order Comments: Dressing change: As per wound care recommendations.     Activity as tolerated   Order Comments: Up with assist, fall precautions       Significant Diagnostic Studies: Labs: CMP   Recent Labs   Lab 03/25/24  0232   *   K 3.2*   CL 98   CO2 29   GLU 97   *   CREATININE 12.3*   CALCIUM 6.2*   ALBUMIN 2.9*   ANIONGAP 20*     , CBC   Recent Labs   Lab 03/25/24  0232   WBC 5.70   HGB 9.6*   HCT 28.2*   PLT 77*     , and INR   Lab Results   Component Value Date    INR 1.1 04/07/2023    INR 1.0 08/25/2015       Pending Diagnostic Studies:       Procedure Component Value Units Date/Time    Basic Metabolic Panel [[id number]]     Order Status: Sent Lab Status: No result     Specimen: Blood     Hepatitis B Core Antibody, Total [[id number]] Collected: 03/24/24 0437    Order Status: Sent Lab Status: In process Updated: 03/24/24 0441    Specimen: Blood     Hepatitis B Surface Ab, Qualitative [[id number]] Collected: 03/24/24 0437    Order Status: Sent Lab Status: In process Updated: 03/24/24 0441    Specimen: Blood     Hepatitis B Surface Antigen [[id number]] Collected: 03/24/24 0437    Order Status: Sent Lab Status: In process Updated: 03/24/24 0441    Specimen: Blood            Medications:  Reconciled Home Medications:      Medication List        START taking these medications      calcium acetate(phosphat bind) 667 mg capsule  Commonly known as: PHOSLO  Take 2 capsules (1,334 mg total) by mouth 3 (three) times daily with meals.     ciprofloxacin-dexAMETHasone 0.3-0.1% 0.3-0.1 % Drps  Commonly known as: CIPRODEX  Place 4 drops into the left ear 2 (two) times daily. for 5 days     doxycycline 100 MG Cap  Commonly known as: VIBRAMYCIN  Take 1 capsule (100 mg  total) by mouth once daily.     fluticasone propionate 50 mcg/actuation nasal spray  Commonly known as: FLONASE  1 spray (50 mcg total) by Each Nostril route 2 (two) times a day.            CONTINUE taking these medications      amLODIPine 5 MG tablet  Commonly known as: NORVASC  Take 1 tablet (5 mg total) by mouth once daily.     aspirin 81 MG EC tablet  Commonly known as: ECOTRIN  Take 2 tablets (162 mg total) by mouth once daily. Resume May 13, after  BID course finishes     GEMTESA 75 mg Tab  Generic drug: vibegron  Take 1 tablet by mouth once daily.     metoprolol succinate 25 MG 24 hr tablet  Commonly known as: TOPROL-XL  Take 1 tablet (25 mg total) by mouth once daily.     senna-docusate 8.6-50 mg 8.6-50 mg per tablet  Commonly known as: PERICOLACE  Take 1 tablet by mouth every 12 (twelve) hours as needed for Constipation.              Indwelling Lines/Drains at time of discharge:   Lines/Drains/Airways       Drain  Duration                  Urethral Catheter 03/22/24 1515 Non-latex 16 Fr. 2 days                    Time spent on the discharge of patient: 36 minutes         Ruth Romo MD  Department of Hospital Medicine  [facility]/Surg

## 2024-03-26 NOTE — PLAN OF CARE
Problem: Infection  Goal: Absence of Infection Signs and Symptoms  Outcome: Met     Problem: Adult Inpatient Plan of Care  Goal: Plan of Care Review  Outcome: Met  Goal: Patient-Specific Goal (Individualized)  Outcome: Met  Goal: Absence of Hospital-Acquired Illness or Injury  Outcome: Met  Goal: Optimal Comfort and Wellbeing  Outcome: Met  Goal: Readiness for Transition of Care  Outcome: Met     Problem: Skin Injury Risk Increased  Goal: Skin Health and Integrity  Outcome: Met     Problem: Fluid and Electrolyte Imbalance (Acute Kidney Injury/Impairment)  Goal: Fluid and Electrolyte Balance  Outcome: Met     Problem: Oral Intake Inadequate (Acute Kidney Injury/Impairment)  Goal: Optimal Nutrition Intake  Outcome: Met     Problem: Renal Function Impairment (Acute Kidney Injury/Impairment)  Goal: Effective Renal Function  Outcome: Met     Problem: Fall Injury Risk  Goal: Absence of Fall and Fall-Related Injury  Outcome: Met     Problem: Impaired Wound Healing  Goal: Optimal Wound Healing  Outcome: Met

## 2024-03-26 NOTE — PT/OT/SLP PROGRESS
Physical Therapy      Patient Name:  Nimisha Longoria   MRN:  [id number]    Patient not seen today secondary to Nurse/ CARLENE hold, Other (Comment) (nurse Jodi reported hold PT due to patient refusing all treatments and going home with Hospice today.). Will follow-up 3/27/24.

## 2024-03-26 NOTE — PLAN OF CARE
Adt30 placed for ambulance transportation home    Pt is clear for dc from [facility]. Discharging home [facility]         03/26/24 1134   Final Note   Assessment Type Final Discharge Note   Anticipated Discharge Disposition [facility] Resources/Appts/Education Provided Post-Acute resouces added to AVS   Post-Acute Status   Post-Acute Authorization Hospice   Hospice Status Set-up Complete/Auth obtained

## 2024-03-28 LAB
BACTERIA BLD CULT: NORMAL
BACTERIA BLD CULT: NORMAL

## 2024-04-04 ENCOUNTER — EXTERNAL HOME HEALTH (OUTPATIENT)
Dept: HOME HEALTH SERVICES | Facility: HOSPITAL | Age: 84
End: 2024-04-04
Payer: MEDICARE

## 2024-04-18 NOTE — TELEPHONE ENCOUNTER
Noted. Postponed remind me.LMOR that it was ok to wait until September and we will call then   Improving No Change

## 2024-05-28 DIAGNOSIS — Z00.00 ENCOUNTER FOR MEDICARE ANNUAL WELLNESS EXAM: ICD-10-CM

## 2024-06-24 PROBLEM — N17.9 ACUTE RENAL FAILURE: Status: RESOLVED | Noted: 2024-01-01 | Resolved: 2024-01-01

## 2024-06-24 PROBLEM — N39.0 UTI (URINARY TRACT INFECTION): Status: RESOLVED | Noted: 2024-01-01 | Resolved: 2024-01-01

## 2024-11-04 ENCOUNTER — TELEPHONE (OUTPATIENT)
Dept: NEUROSURGERY | Facility: CLINIC | Age: 84
End: 2024-11-04
Payer: MEDICARE

## 2024-11-04 NOTE — TELEPHONE ENCOUNTER
----- Message from Michael sent at 11/4/2024  1:07 PM CST -----  Regarding: pt advice  Contact: MRs. Longoria @ [phone]  Pt's wife is calling to advise pt passed 08/31. Please call to advise further if necessary. Thank you for all you are doing.

## 2024-12-16 NOTE — PLAN OF CARE
Patient's nurse can call report to [facility] at [phone].  Patient going to room 246.  Scheduled  time 4:30pm       04/12/23 1315   Post-Acute Status   Post-Acute Authorization Placement   Post-Acute Placement Status Set-up Complete/Auth obtained        Received patient from [facility]. Patient is stable, slightly drowsy but AAOx3. Bilateral nonslip socks on. Bilateral siderails up. Call light within reach. Instructed to call staff for assistance. Family at bedside.

## (undated) DEVICE — STRAP OR TABLE 5IN X 72IN

## (undated) DEVICE — PADDING CAST 4IN SPECIALIST

## (undated) DEVICE — SLEEVE SCD EXPRESS KNEE MEDIUM

## (undated) DEVICE — PAD PERI POST REPLACEMNT

## (undated) DEVICE — PACK CUSTOM UNIV BASIN SLI

## (undated) DEVICE — LINER SUCTION 3000CC

## (undated) DEVICE — COVER PROXIMA MAYO STAND

## (undated) DEVICE — DRAPE STERI-DRAPE 1000 17X11IN

## (undated) DEVICE — DRAPE THREE-QTR REINF 53X77IN

## (undated) DEVICE — DRESSING AQUACEL AG SILVER 4X4

## (undated) DEVICE — SUT 0 VICRYL / CT-1

## (undated) DEVICE — SUT 2-0 VICRYL / CT-1

## (undated) DEVICE — DRAPE C ARM 42 X 120 10/BX

## (undated) DEVICE — TOWEL OR DISP STRL BLUE 4/PK

## (undated) DEVICE — APPLICATOR CHLORAPREP ORN 26ML

## (undated) DEVICE — ALCOHOL 70% ISOP RUBBING 4OZ

## (undated) DEVICE — BNDG COFLEX FOAM LF2 ST 6X5YD

## (undated) DEVICE — GOWN POLY REINF BRTH SLV LG

## (undated) DEVICE — BANDAGE MATRIX HK LOOP 6IN 5YD

## (undated) DEVICE — GOWN POLY REINF BRTH SLV XL

## (undated) DEVICE — DRAPE C-ARMOR EQUIPMENT COVER

## (undated) DEVICE — DRAPE STERI U-SHAPED 47X51IN

## (undated) DEVICE — SOL 9P NACL IRR PIC IL

## (undated) DEVICE — GLOVE SURG ULTRA TOUCH 8

## (undated) DEVICE — IMPLANTABLE DEVICE
Type: IMPLANTABLE DEVICE | Site: FEMUR | Status: NON-FUNCTIONAL
Removed: 2023-04-08

## (undated) DEVICE — TAPE CURAD SILK ADH 3INX10YD

## (undated) DEVICE — DRAPE IOBAN 2 STERI

## (undated) DEVICE — SPONGE BULKEE II ABSRB 6X6.75

## (undated) DEVICE — UNDERGLOVES BIOGEL PI SIZE 8.5

## (undated) DEVICE — SUT ETHILON 3/0 18IN PS-1

## (undated) DEVICE — PADDING WYTEX UNDRCST 6INX4YD

## (undated) DEVICE — WIRE GUIDE 3.2MM 400MM
Type: IMPLANTABLE DEVICE | Site: FEMUR | Status: NON-FUNCTIONAL
Removed: 2023-04-08

## (undated) DEVICE — ELECTRODE REM PLYHSV RETURN 9